# Patient Record
Sex: MALE | Race: WHITE | NOT HISPANIC OR LATINO | Employment: OTHER | URBAN - METROPOLITAN AREA
[De-identification: names, ages, dates, MRNs, and addresses within clinical notes are randomized per-mention and may not be internally consistent; named-entity substitution may affect disease eponyms.]

---

## 2018-07-02 NOTE — PROGRESS NOTES
Subjective   Patient ID: Ric Roy is a 63 y.o. male is being seen for consultation today at the request of Landon Walker MD for neck and left arm pain.  He has not had any recent treatments. He has had two pervious lumbar surgeries. Mr. Daley takes Hydrocodone 7.5/325 PRN for pain.     History of Present Illness    This patient has been having pain in his back and into his arm for about 6 months.  He says that the primary problem is when he turns his head to the left side he gets severe pain in the left side of his neck and up into the left side of his head.  It then begins to radiate down his left shoulder and his left arm with a combination of causalgia pain as well as dysesthesias.  The right arm is okay.  He has not had any specific treatment so far.  He has no difficulty with bowel or bladder control or other associated symptoms.  He says that the pain is severe when it flares up area     The following portions of the patient's history were reviewed and updated as appropriate: allergies, current medications, past family history, past medical history, past social history, past surgical history and problem list.    Review of Systems   Respiratory: Negative for chest tightness and shortness of breath.    Cardiovascular: Negative for chest pain.   Musculoskeletal: Positive for back pain and neck pain.   Neurological: Positive for dizziness, weakness, light-headedness and numbness.   Psychiatric/Behavioral: Positive for sleep disturbance.   All other systems reviewed and are negative.      Objective   Physical Exam   Constitutional: He is oriented to person, place, and time. He appears well-developed and well-nourished.   HENT:   Head: Normocephalic and atraumatic.   Eyes: Conjunctivae and EOM are normal. Pupils are equal, round, and reactive to light.   Fundoscopic exam:       The right eye shows no papilledema. The right eye shows venous pulsations.        The left eye shows no papilledema. The left  eye shows venous pulsations.   Neck: Carotid bruit is not present.   Neurological: He is oriented to person, place, and time. He has a normal Finger-Nose-Finger Test and a normal Heel to Shin Test. Gait normal.   Reflex Scores:       Tricep reflexes are 2+ on the right side and 2+ on the left side.       Bicep reflexes are 2+ on the right side and 2+ on the left side.       Brachioradialis reflexes are 2+ on the right side and 2+ on the left side.       Patellar reflexes are 2+ on the right side and 2+ on the left side.       Achilles reflexes are 2+ on the right side and 2+ on the left side.  Psychiatric: His speech is normal.     Neurologic Exam     Mental Status   Oriented to person, place, and time.   Registration of memory: Good recent and remote memory.   Attention: normal. Concentration: normal.   Speech: speech is normal   Level of consciousness: alert  Knowledge: consistent with education.     Cranial Nerves     CN II   Visual fields full to confrontation.   Visual acuity: normal    CN III, IV, VI   Pupils are equal, round, and reactive to light.  Extraocular motions are normal.     CN V   Facial sensation intact.   Right corneal reflex: normal  Left corneal reflex: normal    CN VII   Facial expression full, symmetric.   Right facial weakness: none  Left facial weakness: none    CN VIII   Hearing: intact    CN IX, X   Palate: symmetric    CN XI   Right sternocleidomastoid strength: normal  Left sternocleidomastoid strength: normal    CN XII   Tongue: not atrophic  Tongue deviation: none    Motor Exam   Muscle bulk: normal  Right arm tone: normal  Left arm tone: normal  Right leg tone: normal  Left leg tone: normal    Strength   Strength 5/5 except as noted.   Right anterior tibial: 0/5  Right posterior tibial: 0/5  Right peroneal: 0/5    Sensory Exam   Light touch normal.   Sensory deficit distribution on right: L5    Gait, Coordination, and Reflexes     Gait  Gait: normal    Coordination   Finger to nose  coordination: normal  Heel to shin coordination: normal    Reflexes   Right brachioradialis: 2+  Left brachioradialis: 2+  Right biceps: 2+  Left biceps: 2+  Right triceps: 2+  Left triceps: 2+  Right patellar: 2+  Left patellar: 2+  Right achilles: 2+  Left achilles: 2+  Right : 2+  Left : 2+      Assessment/Plan   Independent Review of Radiographic Studies:      I reviewed an MRI of his cervical spine done in May of this year.  This shows some foraminal narrowing on the left side at C2 3 but not severe.  There is central narrowing at C3 4 with some distortion of the cord and foraminal narrowing area there may be a bit of cord compression there.  This is also true at C4 5 and at C5 6.  C6 7 is a little narrow also but not as bad as the other levels.  C7-T1 looks okay.    Medical Decision Making:      I told the patient and his wife about the imaging.  I suggested to them that we should probably consider a cervical myelogram.  I told the patient what a myelogram involves.  I explained that there is a 50% chance of developing a bad headache and nausea as a result of the test.  I explained that there is also a very small chance of infection, seizures, and bleeding.  I explained how we would treat a post myelogram headache including bedrest, caffeinated fluids, steroids, and blood patch.  The patient does ask to proceed.    Ric was seen today for neck pain and arm pain.    Diagnoses and all orders for this visit:    Cervical spinal stenosis  -     Obtain Informed Consent; Standing  -     IR Myelogram Cervical Spine; Future  -     CT Cervical Spine Without Contrast; Future  -     XR Spine Cervical Complete With Flex Ext; Future  -     No Lab Testing Needed; Standing  -     dexamethasone (DECADRON) 4 MG tablet; Take 2 tablets by mouth Take As Directed. Take both tablets by mouth 2 hours before myelogram      Return for After radiology test.

## 2018-07-05 ENCOUNTER — OFFICE VISIT (OUTPATIENT)
Dept: NEUROSURGERY | Facility: CLINIC | Age: 63
End: 2018-07-05

## 2018-07-05 VITALS
BODY MASS INDEX: 33.78 KG/M2 | DIASTOLIC BLOOD PRESSURE: 90 MMHG | HEIGHT: 72 IN | WEIGHT: 249.4 LBS | HEART RATE: 80 BPM | SYSTOLIC BLOOD PRESSURE: 160 MMHG

## 2018-07-05 DIAGNOSIS — M48.02 CERVICAL SPINAL STENOSIS: Primary | ICD-10-CM

## 2018-07-05 PROCEDURE — 99244 OFF/OP CNSLTJ NEW/EST MOD 40: CPT | Performed by: NEUROLOGICAL SURGERY

## 2018-07-05 RX ORDER — DEXAMETHASONE 4 MG/1
8 TABLET ORAL TAKE AS DIRECTED
Qty: 2 TABLET | Refills: 0 | Status: SHIPPED | OUTPATIENT
Start: 2018-07-05 | End: 2018-07-10 | Stop reason: HOSPADM

## 2018-07-10 ENCOUNTER — HOSPITAL ENCOUNTER (OUTPATIENT)
Dept: GENERAL RADIOLOGY | Facility: HOSPITAL | Age: 63
Discharge: HOME | End: 2018-07-10
Attending: NEUROLOGICAL SURGERY

## 2018-07-10 ENCOUNTER — HOSPITAL ENCOUNTER (OUTPATIENT)
Dept: CT IMAGING | Facility: HOSPITAL | Age: 63
Discharge: HOME | End: 2018-07-10
Attending: NEUROLOGICAL SURGERY | Admitting: NEUROLOGICAL SURGERY

## 2018-07-10 VITALS
BODY MASS INDEX: 33.59 KG/M2 | WEIGHT: 248 LBS | HEIGHT: 72 IN | TEMPERATURE: 97.9 F | SYSTOLIC BLOOD PRESSURE: 156 MMHG | RESPIRATION RATE: 16 BRPM | HEART RATE: 77 BPM | OXYGEN SATURATION: 96 % | DIASTOLIC BLOOD PRESSURE: 84 MMHG

## 2018-07-10 DIAGNOSIS — M48.02 CERVICAL SPINAL STENOSIS: ICD-10-CM

## 2018-07-10 PROCEDURE — 62305 MYELOGRAPHY LUMBAR INJECTION: CPT

## 2018-07-10 PROCEDURE — 25010000002 IOPAMIDOL 61 % SOLUTION: Performed by: NEUROLOGICAL SURGERY

## 2018-07-10 PROCEDURE — 72052 X-RAY EXAM NECK SPINE 6/>VWS: CPT

## 2018-07-10 PROCEDURE — 72131 CT LUMBAR SPINE W/O DYE: CPT

## 2018-07-10 PROCEDURE — 72125 CT NECK SPINE W/O DYE: CPT

## 2018-07-10 PROCEDURE — 72240 MYELOGRAPHY NECK SPINE: CPT

## 2018-07-10 RX ORDER — HYDROCODONE BITARTRATE AND ACETAMINOPHEN 5; 325 MG/1; MG/1
1 TABLET ORAL EVERY 4 HOURS PRN
Status: DISCONTINUED | OUTPATIENT
Start: 2018-07-10 | End: 2018-07-11 | Stop reason: HOSPADM

## 2018-07-10 RX ORDER — LIDOCAINE HYDROCHLORIDE 10 MG/ML
10 INJECTION, SOLUTION INFILTRATION; PERINEURAL ONCE
Status: COMPLETED | OUTPATIENT
Start: 2018-07-10 | End: 2018-07-10

## 2018-07-10 RX ORDER — ACETAMINOPHEN 325 MG/1
650 TABLET ORAL EVERY 4 HOURS PRN
Status: DISCONTINUED | OUTPATIENT
Start: 2018-07-10 | End: 2018-07-11 | Stop reason: HOSPADM

## 2018-07-10 RX ADMIN — IOPAMIDOL 15 ML: 612 INJECTION, SOLUTION INTRATHECAL at 07:04

## 2018-07-10 RX ADMIN — LIDOCAINE HYDROCHLORIDE 7 ML: 10 INJECTION, SOLUTION INFILTRATION; PERINEURAL at 07:03

## 2018-07-10 NOTE — NURSING NOTE
Verbal and written D/C instructions given to patient and he voices understanding and is able to teach back D/C instructions.

## 2018-07-10 NOTE — NURSING NOTE
Returned to triage. Dressing to low back dry and intact. No complaint of headache. No distress. Fluids encouraged. Breakfast served. He stated his back is hurting but doesn't want any pain mds at this time.

## 2018-07-10 NOTE — DISCHARGE INSTRUCTIONS
EDUCATION /DISCHARGE INSTRUCTIONS:  A myelogram is a special radiology procedure of the spinal cord, spinal nerves and other related structures.  You will be awake during the examination.  An area of your lower back will be cleansed with an antiseptic solution.  The physician will inject a numbing medication in your lower back.  While your back is numb, a needle will be placed in the lower back area.  A small amount of spinal fluid may be withdrawn and sent to the lab if ordered by your physician. While the needle is in the back, an injection of a contrast material (xray dye) will be given through the needle.  The contrast material will allow the physician to see the spinal cord and spinal nerves.  Once injected, the needle will be removed and a band aid will be placed over the injection site.  The table will be tilted during the process to allow the contrast material to flow to particular areas in the spine.  Following the injection and xrays, you will be taken to the CT scan where more pictures will be taken. After the procedure is finished, the contrast material will be absorbed by your body and eliminated through your kidneys.  The radiologist will study and interpret your myelogram and send the results to your physician.    Procedure risks of a myelogram include, but are not limited to:  *  Bleeding   *  seizure  *  Infection   *  Headache, possibly severe requiring  *  Contrast reaction      a blood patch  *  Nerve or cord injury  *  Paralysis and death    Benefits of the procedure:  *  Best examination for delineating pathology related to spinal cord compression from a disc and/or nerve root compression    Alternatives to the procedure:  MRI - a non invasive procedure requiring intravenous contrast injection.  Cannot be done on patients with certain pacemakers or metal in the body.  MRI risks include possible reaction to the contrast material, movement of metal located in the body.  Benefit to MRI:   Non-invasive and usually painless procedure.  THIS EDUCATION INFORMATION WAS REVIEWED PRIOR TO PROCEDURE AND CONSENT. Patient initials________________Time____0645______________    Important information following your myelogram:  * ACTIVITY:   *  Lie down with your head elevated no more than 2 pillows high today & tonight  *  Sit up to eat your meals and use the restroom, otherwise, lie down.  *  Remain less active for two to three days.  *  Do not drive for 48 hours following a myelogram  *  You may remove the bandage and shower in the morning  *  Increase your fluids for the next 24 hours.  Caffeinated drinks are encouraged.    Resume taking diabetic medication  (Glucophage/Metformin) on 7/12/18    Resume taking blood thinner or aspirin on 7/11/18    CALL YOUR PHYSICIAN FOR THE FOLLOWING:  * Pain at the injections site  * Reddness, swelling, bruising or drainage at the injection site.  * A fever by mouth of 101.0  * Any new symptoms    Headaches are a common side effect after a myelogram.  If you get a headache, you should stay flat in bed and drink plenty of fluids. If the headache persist and does not go away with rest/medication, CALL Dr. Rincon at [phone]

## 2018-07-11 ENCOUNTER — TELEPHONE (OUTPATIENT)
Dept: INTERVENTIONAL RADIOLOGY/VASCULAR | Facility: HOSPITAL | Age: 63
End: 2018-07-11

## 2018-07-24 ENCOUNTER — OFFICE VISIT (OUTPATIENT)
Dept: NEUROSURGERY | Facility: CLINIC | Age: 63
End: 2018-07-24

## 2018-07-24 VITALS — HEART RATE: 74 BPM | SYSTOLIC BLOOD PRESSURE: 190 MMHG | DIASTOLIC BLOOD PRESSURE: 92 MMHG

## 2018-07-24 DIAGNOSIS — M48.02 CERVICAL SPINAL STENOSIS: Primary | ICD-10-CM

## 2018-07-24 PROCEDURE — 99213 OFFICE O/P EST LOW 20 MIN: CPT | Performed by: NEUROLOGICAL SURGERY

## 2018-07-24 RX ORDER — CEFAZOLIN SODIUM 2 G/100ML
2 INJECTION, SOLUTION INTRAVENOUS ONCE
Status: CANCELLED | OUTPATIENT
Start: 2018-07-27 | End: 2018-07-27

## 2018-07-24 NOTE — PROGRESS NOTES
Subjective   Patient ID: Ric Roy is a 63 y.o. male is here today for follow-up with a new Cervical Myelogram that was ordered for neck pain that radiates into his left shoulder and down his arm.    History of Present Illness     This patient returns today.  He continues with pain primarily in his neck with radiation into his left shoulder and the upper part of his left arm as well as in his left hand.  He has numbness and tingling in his left hand.  He has no difficulty with bowel or bladder control or other associated symptoms.    The following portions of the patient's history were reviewed and updated as appropriate: allergies, current medications, past family history, past medical history, past social history, past surgical history and problem list.    Review of Systems   Respiratory: Negative for chest tightness and shortness of breath.    Cardiovascular: Negative for chest pain.   Musculoskeletal: Positive for arthralgias ( Left shoulder pain) and neck pain.        Left arm pain   All other systems reviewed and are negative.      Objective   Physical Exam   Constitutional: He is oriented to person, place, and time. He appears well-developed and well-nourished.   Eyes: Pupils are equal, round, and reactive to light. EOM are normal.   Neurological: He is oriented to person, place, and time. He has a normal Finger-Nose-Finger Test and a normal Heel to Shin Test. Gait normal.   Reflex Scores:       Tricep reflexes are 2+ on the right side and 2+ on the left side.       Bicep reflexes are 2+ on the right side and 2+ on the left side.       Brachioradialis reflexes are 2+ on the right side and 2+ on the left side.       Patellar reflexes are 2+ on the right side and 2+ on the left side.       Achilles reflexes are 2+ on the right side and 2+ on the left side.  Psychiatric: His speech is normal.     Neurologic Exam     Mental Status   Oriented to person, place, and time.   Registration of memory: Good recent  and remote memory.   Attention: normal. Concentration: normal.   Speech: speech is normal   Level of consciousness: alert  Knowledge: consistent with education.     Cranial Nerves     CN II   Visual fields full to confrontation.   Visual acuity: normal    CN III, IV, VI   Pupils are equal, round, and reactive to light.  Extraocular motions are normal.     CN V   Facial sensation intact.   Right corneal reflex: normal  Left corneal reflex: normal    CN VII   Facial expression full, symmetric.   Right facial weakness: none  Left facial weakness: none    CN VIII   Hearing: intact    CN IX, X   Palate: symmetric    CN XI   Right sternocleidomastoid strength: normal  Left sternocleidomastoid strength: normal    CN XII   Tongue: not atrophic  Tongue deviation: none    Motor Exam   Muscle bulk: normal  Right arm tone: normal  Left arm tone: normal  Right leg tone: normal  Left leg tone: normal    Strength   Strength 5/5 except as noted.     Sensory Exam   Light touch normal.     Gait, Coordination, and Reflexes     Gait  Gait: normal    Coordination   Finger to nose coordination: normal  Heel to shin coordination: normal    Reflexes   Right brachioradialis: 2+  Left brachioradialis: 2+  Right biceps: 2+  Left biceps: 2+  Right triceps: 2+  Left triceps: 2+  Right patellar: 2+  Left patellar: 2+  Right achilles: 2+  Left achilles: 2+  Right : 2+  Left : 2+      Assessment/Plan   Independent Review of Radiographic Studies:      I reviewed his plain films, myelogram, and CT scan in both the cervical and the lumbar spines.  The plain films in the cervical spine show multilevel degenerative disease but no evidence of instability on flexion and extension.  On the myelogram itself with the initial injection of contrast there was fairly severe left lateral recess stenosis at L3 4 and fairly severe right lateral recess stenosis at L4 5.  At both of those levels especially on the standing films there is decreased nerve root  filling.  There is a grade 1 spondylolisthesis of L3 on L4 but no evidence of overt movement on flexion and extension.  There is also some narrowing at L2-3.  On the myelogram itself there are bilateral nerve root cut outs at C6 7.  There are also bilateral nerve root cut outs at C5 6.  On the post myelographic CT scan of the cervical spine the C2 3 level is widely open.  C3 4 shows some foraminal stenosis but not severe.  C4 5 shows a little flattening of the anterior cord and some foraminal stenosis.  C5 6 shows some ossification of the posterior longitudinal ligament but no severe compression of the cord.  There is however bilateral foraminal stenosis which is pretty severe on the left.  C6 7 also shows some central stenosis with some cord compression and fairly severe bilateral foraminal stenosis.  C7-T1 looks okay.  On the post myelographic CT scan of the lumbar spine the lower thoracic spine down to L1 2 looks okay.  At L2-3 there is some narrowing but I would call it more moderate.  L3 4 shows severe central narrowing.  L4 5 also shows fairly severe central narrowing with a previous laminectomy on the left.  L5-S1 shows some narrowing as well although overall the canal and the neural foramina are fairly open.  There are some nodules in the parotid gland.    Medical Decision Making:      I told the patient and his wife about the imaging.  I told him that we may need to do surgery on his lower back some day right now the more critical problem is the neck.  That is where he has some cord compression and may be developing an early myelopathy.  See little option but to do all 3 levels.  The narrowing at C3 4 is not all that bad and C7-T1 looks okay as well.  I told the patient about the surgery which is called an anterior cervical discectomy.  I explained that there is an 80% chance of getting rid of the arm pain and any other arm symptoms.  I also explained that the patient would still have neck pain.  Initially  this will be quite severe however it will improve with healing from the surgery.  There is also a risk of infection, bleeding, paralysis, and anesthetic risk.  There is a risk of damage to the soft tissues of the neck such as the esophagus, carotids, and trachea.  All of these risks are about 2 or 3%.  There is about a 5% chance of nonunion or failure of the instrumentation.  There is also a about a 5% chance of hoarseness and trouble swallowing do to damage to the recurrent laryngeal nerve.  We discussed the postoperative hospital and home course as well.  The patient does ask to proceed.  I also told him about the parotid nodules and told him we would give him a copy of the CT report to show to his PCP to see if further workup is necessary.    He will need to be scheduled for a: C4 to C7 anterior cervical discectomy, fusion and instrumentation    Ric was seen today for neck pain.    Diagnoses and all orders for this visit:    Cervical spinal stenosis  -     Case Request; Standing  -     ceFAZolin (ANCEF) 2 g in sodium chloride 0.9 % 100 mL IVPB; Infuse 2 g into a venous catheter 1 (One) Time.  -     Case Request    Other orders  -     Follow anesthesia standing orders.  -     Obtain informed consent  -     SCD (sequential compression device)- to be placed on patient in Pre-op; Standing      Return for 2-3 week post op.

## 2018-07-25 ENCOUNTER — APPOINTMENT (OUTPATIENT)
Dept: PREADMISSION TESTING | Facility: HOSPITAL | Age: 63
End: 2018-07-25

## 2018-07-25 VITALS
HEIGHT: 72 IN | DIASTOLIC BLOOD PRESSURE: 99 MMHG | SYSTOLIC BLOOD PRESSURE: 168 MMHG | BODY MASS INDEX: 33.59 KG/M2 | TEMPERATURE: 98.2 F | OXYGEN SATURATION: 97 % | RESPIRATION RATE: 20 BRPM | WEIGHT: 248 LBS | HEART RATE: 67 BPM

## 2018-07-25 LAB
ANION GAP SERPL CALCULATED.3IONS-SCNC: 13.7 MMOL/L
BUN BLD-MCNC: 12 MG/DL (ref 8–23)
BUN/CREAT SERPL: 8.1 (ref 7–25)
CALCIUM SPEC-SCNC: 7.5 MG/DL (ref 8.6–10.5)
CHLORIDE SERPL-SCNC: 101 MMOL/L (ref 98–107)
CO2 SERPL-SCNC: 24.3 MMOL/L (ref 22–29)
CREAT BLD-MCNC: 1.48 MG/DL (ref 0.76–1.27)
DEPRECATED RDW RBC AUTO: 45.6 FL (ref 37–54)
ERYTHROCYTE [DISTWIDTH] IN BLOOD BY AUTOMATED COUNT: 13.7 % (ref 11.5–14.5)
GFR SERPL CREATININE-BSD FRML MDRD: 48 ML/MIN/1.73
GLUCOSE BLD-MCNC: 132 MG/DL (ref 65–99)
HCT VFR BLD AUTO: 41.9 % (ref 40.4–52.2)
HGB BLD-MCNC: 13.3 G/DL (ref 13.7–17.6)
MCH RBC QN AUTO: 29.2 PG (ref 27–32.7)
MCHC RBC AUTO-ENTMCNC: 31.7 G/DL (ref 32.6–36.4)
MCV RBC AUTO: 91.9 FL (ref 79.8–96.2)
PLATELET # BLD AUTO: 162 10*3/MM3 (ref 140–500)
PMV BLD AUTO: 12.8 FL (ref 6–12)
POTASSIUM BLD-SCNC: 4.1 MMOL/L (ref 3.5–5.2)
RBC # BLD AUTO: 4.56 10*6/MM3 (ref 4.6–6)
SODIUM BLD-SCNC: 139 MMOL/L (ref 136–145)
WBC NRBC COR # BLD: 12 10*3/MM3 (ref 4.5–10.7)

## 2018-07-25 PROCEDURE — 93010 ELECTROCARDIOGRAM REPORT: CPT | Performed by: INTERNAL MEDICINE

## 2018-07-25 PROCEDURE — 85027 COMPLETE CBC AUTOMATED: CPT | Performed by: NEUROLOGICAL SURGERY

## 2018-07-25 PROCEDURE — 93005 ELECTROCARDIOGRAM TRACING: CPT

## 2018-07-25 PROCEDURE — 36415 COLL VENOUS BLD VENIPUNCTURE: CPT

## 2018-07-25 PROCEDURE — 80048 BASIC METABOLIC PNL TOTAL CA: CPT | Performed by: NEUROLOGICAL SURGERY

## 2018-07-25 NOTE — DISCHARGE INSTRUCTIONS
Take the following medications the morning of surgery with a small sip of water:    Omeprazole    General Instructions:  • Do not eat solid food after midnight the night before surgery.  • You may drink clear liquids day of surgery but must stop at least one hour before your hospital arrival time.  • It is beneficial for you to have a clear drink that contains carbohydrates the day of surgery.  We suggest a 12 to 20 ounce bottle of Gatorade or Powerade for non-diabetic patients or a 12 to 20 ounce bottle of G2 or Powerade Zero for diabetic patients. (Pediatric patients, are not advised to drink a 12 to 20 ounce carbohydrate drink)    Clear liquids are liquids you can see through.  Nothing red in color.     Plain water                               Sports drinks  Sodas                                   Gelatin (Jell-O)  Fruit juices without pulp such as white grape juice and apple juice  Popsicles that contain no fruit or yogurt  Tea or coffee (no cream or milk added)  Gatorade / Powerade  G2 / Powerade Zero    • Infants may have breast milk up to four hours before surgery.  • Infants drinking formula may drink formula up to six hours before surgery.   • Patients who avoid smoking, chewing tobacco and alcohol for 4 weeks prior to surgery have a reduced risk of post-operative complications.  Quit smoking as many days before surgery as you can.  • Do not smoke, use chewing tobacco or drink alcohol the day of surgery.   • If applicable bring your C-PAP/ BI-PAP machine.  • Bring any papers given to you in the doctor’s office.  • Wear clean comfortable clothes and socks.  • Do not wear contact lenses or make-up.  Bring a case for your glasses.   • Bring crutches or walker if applicable.  • Remove all piercings.  Leave jewelry and any other valuables at home.  • Hair extensions with metal clips must be removed prior to surgery.  • The Pre-Admission Testing nurse will instruct you to bring medications if unable to obtain an  accurate list in Pre-Admission Testing.        If you were given a blood bank ID arm band remember to bring it with you the day of surgery.    Preventing a Surgical Site Infection:  • For 2 to 3 days before surgery, avoid shaving with a razor because the razor can irritate skin and make it easier to develop an infection.    • Any areas of open skin can increase the risk of a post-operative wound infection by allowing bacteria to enter and travel throughout the body.  Notify your surgeon if you have any skin wounds / rashes even if it is not near the expected surgical site.  The area will need assessed to determine if surgery should be delayed until it is healed.  • The night prior to surgery sleep in a clean bed with clean clothing.  Do not allow pets to sleep with you.  • Shower on the morning of surgery using a fresh bar of anti-bacterial soap (such as Dial) and clean washcloth.  Dry with a clean towel and dress in clean clothing.  • Ask your surgeon if you will be receiving antibiotics prior to surgery.  • Make sure you, your family, and all healthcare providers clean their hands with soap and water or an alcohol based hand  before caring for you or your wound.    Day of surgery:7/27/18   0530  Upon arrival, a Pre-op nurse and Anesthesiologist will review your health history, obtain vital signs, and answer questions you may have.  The only belongings needed at this time will be your home medications and if applicable your C-PAP/BI-PAP machine.  If you are staying overnight your family can leave the rest of your belongings in the car and bring them to your room later.  A Pre-op nurse will start an IV and you may receive medication in preparation for surgery, including something to help you relax.  Your family will be able to see you in the Pre-op area.  While you are in surgery your family should notify the waiting room  if they leave the waiting room area and provide a contact phone  number.    Please be aware that surgery does come with discomfort.  We want to make every effort to control your discomfort so please discuss any uncontrolled symptoms with your nurse.   Your doctor will most likely have prescribed pain medications.      If you are going home after surgery you will receive individualized written care instructions before being discharged.  A responsible adult must drive you to and from the hospital on the day of your surgery and stay with you for 24 hours.    If you are staying overnight following surgery, you will be transported to your hospital room following the recovery period.  [facility] has all private rooms.    You have received a list of surgical assistants for your reference.  If you have any questions please call Pre-Admission Testing at [phone].  Deductibles and co-payments are collected on the day of service. Please be prepared to pay the required co-pay, deductible or deposit on the day of service as defined by your plan.

## 2018-07-27 ENCOUNTER — APPOINTMENT (OUTPATIENT)
Dept: GENERAL RADIOLOGY | Facility: HOSPITAL | Age: 63
End: 2018-07-27

## 2018-07-27 ENCOUNTER — ANESTHESIA (OUTPATIENT)
Dept: PERIOP | Facility: HOSPITAL | Age: 63
End: 2018-07-27

## 2018-07-27 ENCOUNTER — ANESTHESIA EVENT (OUTPATIENT)
Dept: PERIOP | Facility: HOSPITAL | Age: 63
End: 2018-07-27

## 2018-07-27 ENCOUNTER — HOSPITAL ENCOUNTER (INPATIENT)
Facility: HOSPITAL | Age: 63
LOS: 3 days | Discharge: HOME | End: 2018-07-30
Attending: NEUROLOGICAL SURGERY | Admitting: NEUROLOGICAL SURGERY

## 2018-07-27 DIAGNOSIS — M48.02 CERVICAL SPINAL STENOSIS: ICD-10-CM

## 2018-07-27 PROBLEM — R11.2 NAUSEA & VOMITING: Status: ACTIVE | Noted: 2018-07-27

## 2018-07-27 PROBLEM — E11.9 DIABETES MELLITUS (HCC): Status: ACTIVE | Noted: 2018-07-27

## 2018-07-27 PROBLEM — N18.30 STAGE 3 CHRONIC KIDNEY DISEASE (HCC): Status: ACTIVE | Noted: 2018-07-27

## 2018-07-27 PROBLEM — I10 HYPERTENSION: Status: ACTIVE | Noted: 2018-07-27

## 2018-07-27 LAB
GLUCOSE BLDC GLUCOMTR-MCNC: 131 MG/DL (ref 70–130)
GLUCOSE BLDC GLUCOMTR-MCNC: 175 MG/DL (ref 70–130)
GLUCOSE BLDC GLUCOMTR-MCNC: 178 MG/DL (ref 70–130)

## 2018-07-27 PROCEDURE — 25010000002 ONDANSETRON PER 1 MG: Performed by: NEUROLOGICAL SURGERY

## 2018-07-27 PROCEDURE — 22551 ARTHRD ANT NTRBDY CERVICAL: CPT | Performed by: NEUROLOGICAL SURGERY

## 2018-07-27 PROCEDURE — 25010000002 PHENYLEPHRINE PER 1 ML: Performed by: NURSE ANESTHETIST, CERTIFIED REGISTERED

## 2018-07-27 PROCEDURE — C1713 ANCHOR/SCREW BN/BN,TIS/BN: HCPCS | Performed by: NEUROLOGICAL SURGERY

## 2018-07-27 PROCEDURE — 25010000002 FENTANYL CITRATE (PF) 100 MCG/2ML SOLUTION: Performed by: NURSE ANESTHETIST, CERTIFIED REGISTERED

## 2018-07-27 PROCEDURE — 25010000003 CEFAZOLIN IN DEXTROSE 2-4 GM/100ML-% SOLUTION: Performed by: NEUROLOGICAL SURGERY

## 2018-07-27 PROCEDURE — 25010000002 DEXAMETHASONE PER 1 MG: Performed by: NURSE ANESTHETIST, CERTIFIED REGISTERED

## 2018-07-27 PROCEDURE — 0RT30ZZ RESECTION OF CERVICAL VERTEBRAL DISC, OPEN APPROACH: ICD-10-PCS | Performed by: NEUROLOGICAL SURGERY

## 2018-07-27 PROCEDURE — 25010000002 MIDAZOLAM PER 1 MG: Performed by: ANESTHESIOLOGY

## 2018-07-27 PROCEDURE — 25010000002 DIPHENHYDRAMINE PER 50 MG: Performed by: INTERNAL MEDICINE

## 2018-07-27 PROCEDURE — 25010000002 FENTANYL CITRATE (PF) 100 MCG/2ML SOLUTION: Performed by: ANESTHESIOLOGY

## 2018-07-27 PROCEDURE — 22853 INSJ BIOMECHANICAL DEVICE: CPT | Performed by: NEUROLOGICAL SURGERY

## 2018-07-27 PROCEDURE — 25010000003 CEFAZOLIN PER 500 MG: Performed by: NEUROLOGICAL SURGERY

## 2018-07-27 PROCEDURE — 76000 FLUOROSCOPY <1 HR PHYS/QHP: CPT

## 2018-07-27 PROCEDURE — 22846 INSERT SPINE FIXATION DEVICE: CPT | Performed by: NEUROLOGICAL SURGERY

## 2018-07-27 PROCEDURE — 22552 ARTHRD ANT NTRBD CERVICAL EA: CPT | Performed by: NEUROLOGICAL SURGERY

## 2018-07-27 PROCEDURE — G0378 HOSPITAL OBSERVATION PER HR: HCPCS

## 2018-07-27 PROCEDURE — L0172 CERV COL SR FOAM 2PC PRE OTS: HCPCS | Performed by: NEUROLOGICAL SURGERY

## 2018-07-27 PROCEDURE — 25010000002 ONDANSETRON PER 1 MG: Performed by: NURSE ANESTHETIST, CERTIFIED REGISTERED

## 2018-07-27 PROCEDURE — 63710000001 PROMETHAZINE PER 12.5 MG: Performed by: NEUROLOGICAL SURGERY

## 2018-07-27 PROCEDURE — 25010000002 PROPOFOL 10 MG/ML EMULSION: Performed by: NURSE ANESTHETIST, CERTIFIED REGISTERED

## 2018-07-27 PROCEDURE — 25010000002 PROCHLORPERAZINE EDISYLATE PER 10 MG: Performed by: INTERNAL MEDICINE

## 2018-07-27 PROCEDURE — 25010000002 MORPHINE PER 10 MG: Performed by: NEUROLOGICAL SURGERY

## 2018-07-27 PROCEDURE — 25010000002 HYDROMORPHONE PER 4 MG: Performed by: NURSE ANESTHETIST, CERTIFIED REGISTERED

## 2018-07-27 PROCEDURE — 0RG20A0 FUSION OF 2 OR MORE CERVICAL VERTEBRAL JOINTS WITH INTERBODY FUSION DEVICE, ANTERIOR APPROACH, ANTERIOR COLUMN, OPEN APPROACH: ICD-10-PCS | Performed by: NEUROLOGICAL SURGERY

## 2018-07-27 PROCEDURE — 72040 X-RAY EXAM NECK SPINE 2-3 VW: CPT

## 2018-07-27 PROCEDURE — 82962 GLUCOSE BLOOD TEST: CPT

## 2018-07-27 DEVICE — IMPLANT 6240664 ANATOMIC 16X14X6MM
Type: IMPLANTABLE DEVICE | Site: SPINE CERVICAL | Status: FUNCTIONAL
Brand: VERTE-STACK® SPINAL SYSTEM

## 2018-07-27 DEVICE — PLATE 7200065 ATL VISION ELITE 65MM
Type: IMPLANTABLE DEVICE | Site: SPINE CERVICAL | Status: FUNCTIONAL
Brand: ATLANTIS® ANTERIOR CERVICAL PLATE SYSTEM

## 2018-07-27 DEVICE — PUTTY DBF GRAFTON 3CC: Type: IMPLANTABLE DEVICE | Site: SPINE CERVICAL | Status: FUNCTIONAL

## 2018-07-27 RX ORDER — ROCURONIUM BROMIDE 10 MG/ML
INJECTION, SOLUTION INTRAVENOUS AS NEEDED
Status: DISCONTINUED | OUTPATIENT
Start: 2018-07-27 | End: 2018-07-27 | Stop reason: SURG

## 2018-07-27 RX ORDER — GABAPENTIN 300 MG/1
300 CAPSULE ORAL 3 TIMES DAILY
Status: DISCONTINUED | OUTPATIENT
Start: 2018-07-27 | End: 2018-07-30 | Stop reason: HOSPADM

## 2018-07-27 RX ORDER — CEFAZOLIN SODIUM 2 G/100ML
2 INJECTION, SOLUTION INTRAVENOUS ONCE
Status: COMPLETED | OUTPATIENT
Start: 2018-07-27 | End: 2018-07-27

## 2018-07-27 RX ORDER — ONDANSETRON 2 MG/ML
4 INJECTION INTRAMUSCULAR; INTRAVENOUS ONCE AS NEEDED
Status: DISCONTINUED | OUTPATIENT
Start: 2018-07-27 | End: 2018-07-27 | Stop reason: HOSPADM

## 2018-07-27 RX ORDER — DIPHENHYDRAMINE HYDROCHLORIDE 50 MG/ML
25 INJECTION INTRAMUSCULAR; INTRAVENOUS ONCE
Status: COMPLETED | OUTPATIENT
Start: 2018-07-27 | End: 2018-07-27

## 2018-07-27 RX ORDER — ATENOLOL 25 MG/1
25 TABLET ORAL NIGHTLY
Status: DISCONTINUED | OUTPATIENT
Start: 2018-07-27 | End: 2018-07-30 | Stop reason: HOSPADM

## 2018-07-27 RX ORDER — FENTANYL CITRATE 50 UG/ML
INJECTION, SOLUTION INTRAMUSCULAR; INTRAVENOUS AS NEEDED
Status: DISCONTINUED | OUTPATIENT
Start: 2018-07-27 | End: 2018-07-27 | Stop reason: SURG

## 2018-07-27 RX ORDER — LIDOCAINE HYDROCHLORIDE 10 MG/ML
0.5 INJECTION, SOLUTION EPIDURAL; INFILTRATION; INTRACAUDAL; PERINEURAL ONCE AS NEEDED
Status: DISCONTINUED | OUTPATIENT
Start: 2018-07-27 | End: 2018-07-27 | Stop reason: HOSPADM

## 2018-07-27 RX ORDER — ONDANSETRON 2 MG/ML
INJECTION INTRAMUSCULAR; INTRAVENOUS AS NEEDED
Status: DISCONTINUED | OUTPATIENT
Start: 2018-07-27 | End: 2018-07-27 | Stop reason: SURG

## 2018-07-27 RX ORDER — EPHEDRINE SULFATE 50 MG/ML
5 INJECTION, SOLUTION INTRAVENOUS ONCE AS NEEDED
Status: DISCONTINUED | OUTPATIENT
Start: 2018-07-27 | End: 2018-07-27 | Stop reason: HOSPADM

## 2018-07-27 RX ORDER — PROPOFOL 10 MG/ML
VIAL (ML) INTRAVENOUS AS NEEDED
Status: DISCONTINUED | OUTPATIENT
Start: 2018-07-27 | End: 2018-07-27 | Stop reason: SURG

## 2018-07-27 RX ORDER — SODIUM CHLORIDE AND POTASSIUM CHLORIDE 150; 900 MG/100ML; MG/100ML
100 INJECTION, SOLUTION INTRAVENOUS CONTINUOUS
Status: DISCONTINUED | OUTPATIENT
Start: 2018-07-27 | End: 2018-07-27

## 2018-07-27 RX ORDER — FENTANYL CITRATE 50 UG/ML
50 INJECTION, SOLUTION INTRAMUSCULAR; INTRAVENOUS
Status: DISCONTINUED | OUTPATIENT
Start: 2018-07-27 | End: 2018-07-27 | Stop reason: HOSPADM

## 2018-07-27 RX ORDER — DIPHENHYDRAMINE HYDROCHLORIDE 50 MG/ML
12.5 INJECTION INTRAMUSCULAR; INTRAVENOUS
Status: DISCONTINUED | OUTPATIENT
Start: 2018-07-27 | End: 2018-07-27 | Stop reason: HOSPADM

## 2018-07-27 RX ORDER — NALOXONE HCL 0.4 MG/ML
0.4 VIAL (ML) INJECTION
Status: DISCONTINUED | OUTPATIENT
Start: 2018-07-27 | End: 2018-07-30 | Stop reason: HOSPADM

## 2018-07-27 RX ORDER — ONDANSETRON 2 MG/ML
4 INJECTION INTRAMUSCULAR; INTRAVENOUS EVERY 6 HOURS PRN
Status: DISCONTINUED | OUTPATIENT
Start: 2018-07-27 | End: 2018-07-30 | Stop reason: HOSPADM

## 2018-07-27 RX ORDER — LABETALOL HYDROCHLORIDE 5 MG/ML
5 INJECTION, SOLUTION INTRAVENOUS
Status: DISCONTINUED | OUTPATIENT
Start: 2018-07-27 | End: 2018-07-27 | Stop reason: HOSPADM

## 2018-07-27 RX ORDER — NICOTINE POLACRILEX 4 MG
15 LOZENGE BUCCAL
Status: DISCONTINUED | OUTPATIENT
Start: 2018-07-27 | End: 2018-07-30 | Stop reason: HOSPADM

## 2018-07-27 RX ORDER — DEXTROSE MONOHYDRATE 25 G/50ML
25 INJECTION, SOLUTION INTRAVENOUS
Status: DISCONTINUED | OUTPATIENT
Start: 2018-07-27 | End: 2018-07-30 | Stop reason: HOSPADM

## 2018-07-27 RX ORDER — FLUMAZENIL 0.1 MG/ML
0.2 INJECTION INTRAVENOUS AS NEEDED
Status: DISCONTINUED | OUTPATIENT
Start: 2018-07-27 | End: 2018-07-27 | Stop reason: HOSPADM

## 2018-07-27 RX ORDER — PROMETHAZINE HYDROCHLORIDE 12.5 MG/1
12.5 TABLET ORAL EVERY 6 HOURS PRN
Status: DISCONTINUED | OUTPATIENT
Start: 2018-07-27 | End: 2018-07-30 | Stop reason: HOSPADM

## 2018-07-27 RX ORDER — MIDAZOLAM HYDROCHLORIDE 1 MG/ML
1 INJECTION INTRAMUSCULAR; INTRAVENOUS
Status: DISCONTINUED | OUTPATIENT
Start: 2018-07-27 | End: 2018-07-27 | Stop reason: HOSPADM

## 2018-07-27 RX ORDER — CEFAZOLIN SODIUM 2 G/100ML
2 INJECTION, SOLUTION INTRAVENOUS EVERY 8 HOURS
Status: COMPLETED | OUTPATIENT
Start: 2018-07-27 | End: 2018-07-29

## 2018-07-27 RX ORDER — HYDRALAZINE HYDROCHLORIDE 25 MG/1
25 TABLET, FILM COATED ORAL EVERY 6 HOURS PRN
Status: DISCONTINUED | OUTPATIENT
Start: 2018-07-27 | End: 2018-07-30 | Stop reason: HOSPADM

## 2018-07-27 RX ORDER — DEXAMETHASONE SODIUM PHOSPHATE 10 MG/ML
INJECTION INTRAMUSCULAR; INTRAVENOUS AS NEEDED
Status: DISCONTINUED | OUTPATIENT
Start: 2018-07-27 | End: 2018-07-27 | Stop reason: SURG

## 2018-07-27 RX ORDER — MIDAZOLAM HYDROCHLORIDE 1 MG/ML
2 INJECTION INTRAMUSCULAR; INTRAVENOUS
Status: DISCONTINUED | OUTPATIENT
Start: 2018-07-27 | End: 2018-07-27 | Stop reason: HOSPADM

## 2018-07-27 RX ORDER — HEPARIN SODIUM 10000 [USP'U]/ML
INJECTION, SOLUTION INTRAVENOUS; SUBCUTANEOUS AS NEEDED
Status: DISCONTINUED | OUTPATIENT
Start: 2018-07-27 | End: 2018-07-27 | Stop reason: HOSPADM

## 2018-07-27 RX ORDER — SODIUM CHLORIDE 0.9 % (FLUSH) 0.9 %
1-10 SYRINGE (ML) INJECTION AS NEEDED
Status: DISCONTINUED | OUTPATIENT
Start: 2018-07-27 | End: 2018-07-30 | Stop reason: HOSPADM

## 2018-07-27 RX ORDER — NALOXONE HCL 0.4 MG/ML
0.2 VIAL (ML) INJECTION AS NEEDED
Status: DISCONTINUED | OUTPATIENT
Start: 2018-07-27 | End: 2018-07-27 | Stop reason: HOSPADM

## 2018-07-27 RX ORDER — LISINOPRIL 10 MG/1
10 TABLET ORAL EVERY MORNING
Status: DISCONTINUED | OUTPATIENT
Start: 2018-07-27 | End: 2018-07-28

## 2018-07-27 RX ORDER — SODIUM CHLORIDE, SODIUM LACTATE, POTASSIUM CHLORIDE, CALCIUM CHLORIDE 600; 310; 30; 20 MG/100ML; MG/100ML; MG/100ML; MG/100ML
9 INJECTION, SOLUTION INTRAVENOUS CONTINUOUS
Status: DISCONTINUED | OUTPATIENT
Start: 2018-07-27 | End: 2018-07-27

## 2018-07-27 RX ORDER — SODIUM CHLORIDE 9 MG/ML
INJECTION, SOLUTION INTRAVENOUS AS NEEDED
Status: DISCONTINUED | OUTPATIENT
Start: 2018-07-27 | End: 2018-07-27 | Stop reason: HOSPADM

## 2018-07-27 RX ORDER — SODIUM CHLORIDE 0.9 % (FLUSH) 0.9 %
1-10 SYRINGE (ML) INJECTION AS NEEDED
Status: DISCONTINUED | OUTPATIENT
Start: 2018-07-27 | End: 2018-07-27 | Stop reason: HOSPADM

## 2018-07-27 RX ORDER — HYDROCODONE BITARTRATE AND ACETAMINOPHEN 5; 325 MG/1; MG/1
1 TABLET ORAL EVERY 4 HOURS PRN
Status: DISCONTINUED | OUTPATIENT
Start: 2018-07-27 | End: 2018-07-30 | Stop reason: HOSPADM

## 2018-07-27 RX ORDER — ONDANSETRON 4 MG/1
4 TABLET, ORALLY DISINTEGRATING ORAL EVERY 6 HOURS PRN
Status: DISCONTINUED | OUTPATIENT
Start: 2018-07-27 | End: 2018-07-30 | Stop reason: HOSPADM

## 2018-07-27 RX ORDER — PIOGLITAZONEHYDROCHLORIDE 30 MG/1
30 TABLET ORAL EVERY MORNING
Status: DISCONTINUED | OUTPATIENT
Start: 2018-07-28 | End: 2018-07-30 | Stop reason: HOSPADM

## 2018-07-27 RX ORDER — PROMETHAZINE HYDROCHLORIDE 25 MG/ML
12.5 INJECTION, SOLUTION INTRAMUSCULAR; INTRAVENOUS ONCE AS NEEDED
Status: DISCONTINUED | OUTPATIENT
Start: 2018-07-27 | End: 2018-07-27 | Stop reason: HOSPADM

## 2018-07-27 RX ORDER — PROMETHAZINE HYDROCHLORIDE 25 MG/1
25 TABLET ORAL ONCE AS NEEDED
Status: DISCONTINUED | OUTPATIENT
Start: 2018-07-27 | End: 2018-07-27 | Stop reason: HOSPADM

## 2018-07-27 RX ORDER — HYDROMORPHONE HCL 110MG/55ML
PATIENT CONTROLLED ANALGESIA SYRINGE INTRAVENOUS AS NEEDED
Status: DISCONTINUED | OUTPATIENT
Start: 2018-07-27 | End: 2018-07-27 | Stop reason: SURG

## 2018-07-27 RX ORDER — LIDOCAINE HYDROCHLORIDE 20 MG/ML
INJECTION, SOLUTION INFILTRATION; PERINEURAL AS NEEDED
Status: DISCONTINUED | OUTPATIENT
Start: 2018-07-27 | End: 2018-07-27 | Stop reason: SURG

## 2018-07-27 RX ORDER — HYDROMORPHONE HYDROCHLORIDE 1 MG/ML
0.5 INJECTION, SOLUTION INTRAMUSCULAR; INTRAVENOUS; SUBCUTANEOUS
Status: DISCONTINUED | OUTPATIENT
Start: 2018-07-27 | End: 2018-07-27 | Stop reason: HOSPADM

## 2018-07-27 RX ORDER — IPRATROPIUM BROMIDE AND ALBUTEROL SULFATE 2.5; .5 MG/3ML; MG/3ML
3 SOLUTION RESPIRATORY (INHALATION)
Status: DISCONTINUED | OUTPATIENT
Start: 2018-07-27 | End: 2018-07-27

## 2018-07-27 RX ORDER — SODIUM CHLORIDE 9 MG/ML
100 INJECTION, SOLUTION INTRAVENOUS CONTINUOUS
Status: DISCONTINUED | OUTPATIENT
Start: 2018-07-27 | End: 2018-07-29

## 2018-07-27 RX ORDER — PROMETHAZINE HYDROCHLORIDE 25 MG/1
25 SUPPOSITORY RECTAL ONCE AS NEEDED
Status: DISCONTINUED | OUTPATIENT
Start: 2018-07-27 | End: 2018-07-27 | Stop reason: HOSPADM

## 2018-07-27 RX ORDER — MORPHINE SULFATE 2 MG/ML
2 INJECTION, SOLUTION INTRAMUSCULAR; INTRAVENOUS EVERY 4 HOURS PRN
Status: DISCONTINUED | OUTPATIENT
Start: 2018-07-27 | End: 2018-07-30 | Stop reason: HOSPADM

## 2018-07-27 RX ORDER — ONDANSETRON 4 MG/1
4 TABLET, FILM COATED ORAL EVERY 6 HOURS PRN
Status: DISCONTINUED | OUTPATIENT
Start: 2018-07-27 | End: 2018-07-30 | Stop reason: HOSPADM

## 2018-07-27 RX ORDER — FAMOTIDINE 10 MG/ML
20 INJECTION, SOLUTION INTRAVENOUS ONCE
Status: COMPLETED | OUTPATIENT
Start: 2018-07-27 | End: 2018-07-27

## 2018-07-27 RX ORDER — SCOLOPAMINE TRANSDERMAL SYSTEM 1 MG/1
1 PATCH, EXTENDED RELEASE TRANSDERMAL
Status: DISCONTINUED | OUTPATIENT
Start: 2018-07-27 | End: 2018-07-30 | Stop reason: HOSPADM

## 2018-07-27 RX ADMIN — LIDOCAINE HYDROCHLORIDE 100 MG: 20 INJECTION, SOLUTION INFILTRATION; PERINEURAL at 08:05

## 2018-07-27 RX ADMIN — ROCURONIUM BROMIDE 50 MG: 10 INJECTION INTRAVENOUS at 08:05

## 2018-07-27 RX ADMIN — SODIUM CHLORIDE, POTASSIUM CHLORIDE, SODIUM LACTATE AND CALCIUM CHLORIDE 9 ML/HR: 600; 310; 30; 20 INJECTION, SOLUTION INTRAVENOUS at 07:00

## 2018-07-27 RX ADMIN — HYDROCODONE BITARTRATE AND ACETAMINOPHEN 1 TABLET: 5; 325 TABLET ORAL at 21:09

## 2018-07-27 RX ADMIN — SODIUM CHLORIDE, POTASSIUM CHLORIDE, SODIUM LACTATE AND CALCIUM CHLORIDE: 600; 310; 30; 20 INJECTION, SOLUTION INTRAVENOUS at 10:37

## 2018-07-27 RX ADMIN — FAMOTIDINE 20 MG: 10 INJECTION INTRAVENOUS at 06:49

## 2018-07-27 RX ADMIN — LABETALOL HYDROCHLORIDE 5 MG: 5 INJECTION INTRAVENOUS at 12:07

## 2018-07-27 RX ADMIN — HYDROMORPHONE HYDROCHLORIDE 0.5 MG: 2 INJECTION INTRAMUSCULAR; INTRAVENOUS; SUBCUTANEOUS at 10:39

## 2018-07-27 RX ADMIN — LABETALOL HYDROCHLORIDE 5 MG: 5 INJECTION INTRAVENOUS at 11:20

## 2018-07-27 RX ADMIN — PROMETHAZINE HYDROCHLORIDE 12.5 MG: 12.5 TABLET ORAL at 16:54

## 2018-07-27 RX ADMIN — PROCHLORPERAZINE EDISYLATE 10 MG: 5 INJECTION INTRAMUSCULAR; INTRAVENOUS at 20:14

## 2018-07-27 RX ADMIN — FENTANYL CITRATE 100 MCG: 50 INJECTION, SOLUTION INTRAMUSCULAR; INTRAVENOUS at 08:36

## 2018-07-27 RX ADMIN — MIDAZOLAM 1 MG: 1 INJECTION INTRAMUSCULAR; INTRAVENOUS at 06:49

## 2018-07-27 RX ADMIN — HYDROMORPHONE HYDROCHLORIDE 0.5 MG: 2 INJECTION INTRAMUSCULAR; INTRAVENOUS; SUBCUTANEOUS at 10:53

## 2018-07-27 RX ADMIN — FENTANYL CITRATE 50 MCG: 50 INJECTION, SOLUTION INTRAMUSCULAR; INTRAVENOUS at 06:49

## 2018-07-27 RX ADMIN — CEFAZOLIN SODIUM 2 G: 2 INJECTION, SOLUTION INTRAVENOUS at 08:15

## 2018-07-27 RX ADMIN — GABAPENTIN 300 MG: 300 CAPSULE ORAL at 20:09

## 2018-07-27 RX ADMIN — FENTANYL CITRATE 150 MCG: 50 INJECTION, SOLUTION INTRAMUSCULAR; INTRAVENOUS at 08:02

## 2018-07-27 RX ADMIN — DIPHENHYDRAMINE HYDROCHLORIDE 25 MG: 50 INJECTION, SOLUTION INTRAMUSCULAR; INTRAVENOUS at 20:14

## 2018-07-27 RX ADMIN — SUGAMMADEX 200 MG: 100 INJECTION, SOLUTION INTRAVENOUS at 10:43

## 2018-07-27 RX ADMIN — FENTANYL CITRATE 50 MCG: 50 INJECTION, SOLUTION INTRAMUSCULAR; INTRAVENOUS at 11:58

## 2018-07-27 RX ADMIN — PROPOFOL 300 MG: 10 INJECTION, EMULSION INTRAVENOUS at 08:05

## 2018-07-27 RX ADMIN — DEXAMETHASONE SODIUM PHOSPHATE 4 MG: 10 INJECTION INTRAMUSCULAR; INTRAVENOUS at 10:32

## 2018-07-27 RX ADMIN — LABETALOL HYDROCHLORIDE 5 MG: 5 INJECTION INTRAVENOUS at 11:31

## 2018-07-27 RX ADMIN — MORPHINE SULFATE 2 MG: 2 INJECTION, SOLUTION INTRAMUSCULAR; INTRAVENOUS at 21:09

## 2018-07-27 RX ADMIN — SCOPOLAMINE 1 PATCH: 1 PATCH, EXTENDED RELEASE TRANSDERMAL at 20:08

## 2018-07-27 RX ADMIN — CEFAZOLIN SODIUM 2 G: 2 INJECTION, SOLUTION INTRAVENOUS at 20:08

## 2018-07-27 RX ADMIN — SODIUM CHLORIDE 100 ML/HR: 9 INJECTION, SOLUTION INTRAVENOUS at 20:08

## 2018-07-27 RX ADMIN — PHENYLEPHRINE HYDROCHLORIDE 100 MCG: 10 INJECTION INTRAVENOUS at 10:25

## 2018-07-27 RX ADMIN — FENTANYL CITRATE 50 MCG: 50 INJECTION, SOLUTION INTRAMUSCULAR; INTRAVENOUS at 11:05

## 2018-07-27 RX ADMIN — CEFAZOLIN SODIUM 1 G: 2 INJECTION, SOLUTION INTRAVENOUS at 10:29

## 2018-07-27 RX ADMIN — MORPHINE SULFATE 2 MG: 2 INJECTION, SOLUTION INTRAMUSCULAR; INTRAVENOUS at 17:18

## 2018-07-27 RX ADMIN — ATENOLOL 25 MG: 25 TABLET ORAL at 20:09

## 2018-07-27 RX ADMIN — LISINOPRIL 10 MG: 10 TABLET ORAL at 12:20

## 2018-07-27 RX ADMIN — ONDANSETRON 4 MG: 2 INJECTION INTRAMUSCULAR; INTRAVENOUS at 10:32

## 2018-07-27 RX ADMIN — ONDANSETRON 4 MG: 2 INJECTION, SOLUTION INTRAMUSCULAR; INTRAVENOUS at 14:58

## 2018-07-27 RX ADMIN — LABETALOL HYDROCHLORIDE 5 MG: 5 INJECTION INTRAVENOUS at 12:19

## 2018-07-27 NOTE — ANESTHESIA PREPROCEDURE EVALUATION
Anesthesia Evaluation     Patient summary reviewed   NPO Solid Status: > 8 hours  NPO Liquid Status: > 2 hours           Airway   Mallampati: II  TM distance: >3 FB  Dental      Pulmonary    (+) a smoker Current Abstained day of surgery,   Cardiovascular     ECG reviewed  Rhythm: regular  Rate: normal    (+) hypertension, hyperlipidemia,       Neuro/Psych  GI/Hepatic/Renal/Endo    (+)   diabetes mellitus,     Musculoskeletal     Abdominal    Substance History      OB/GYN          Other                        Anesthesia Plan    ASA 3     general     intravenous induction   Anesthetic plan and risks discussed with patient.    Plan discussed with CRNA.

## 2018-07-27 NOTE — ANESTHESIA PROCEDURE NOTES
Airway  Urgency: elective    Date/Time: 7/27/2018 8:07 AM  Airway not difficult    General Information and Staff    Patient location during procedure: OR  Anesthesiologist: BURT ALBARRAN  CRNA: OLGA MATA    Indications and Patient Condition  Indications for airway management: airway protection    Preoxygenated: yes  Mask difficulty assessment: 3 - difficult mask (inadequate, unstable or two providers) +/- NMBA    Final Airway Details  Final airway type: endotracheal airway      Successful airway: ETT and reinforced tube  Cuffed: yes   Successful intubation technique: direct laryngoscopy  Facilitating devices/methods: intubating stylet and anterior pressure/BURP  Endotracheal tube insertion site: oral  Blade: Shafer  Blade size: #2  ETT size: 8.0 mm  Cormack-Lehane Classification: grade IIa - partial view of glottis  Placement verified by: chest auscultation and capnometry   Cuff volume (mL): 8  Measured from: lips  ETT to lips (cm): 22  Number of attempts at approach: 1    Additional Comments  SIVI.  EYES TAPED CLOSED PRIOR TO DL.  INTUBATION AS CHARTED ABOVE.  APPEARS ATRAUMATIC.  NO CHANGE TO DENTITION. +ETCO2. +BBS. +CR.

## 2018-07-27 NOTE — ANESTHESIA POSTPROCEDURE EVALUATION
"Patient: Ric Roy    Procedure Summary     Date:  07/27/18 Room / Location:  [facility] OR  / [facility] MAIN OR    Anesthesia Start:  0757 Anesthesia Stop:  1102    Procedure:  C4 to C7 anterior cervical discectomy, fusion and instrumentation (N/A Spine Cervical) Diagnosis:       Cervical spinal stenosis      (Cervical spinal stenosis [M48.02])    Surgeon:  Lavon Rincon MD Provider:  Emily Ross MD    Anesthesia Type:  general ASA Status:  3          Anesthesia Type: general  Last vitals  BP   166/83 (07/27/18 1320)   Temp   36.9 °C (98.5 °F) (07/27/18 1059)   Pulse   75 (07/27/18 1320)   Resp   18 (07/27/18 1320)     SpO2   96 % (07/27/18 1320)     Post Anesthesia Care and Evaluation    Patient location during evaluation: bedside  Patient participation: complete - patient participated  Level of consciousness: sleepy but conscious  Pain score: 0  Pain management: adequate  Airway patency: patent  Anesthetic complications: No anesthetic complications    Cardiovascular status: acceptable  Respiratory status: acceptable  Hydration status: acceptable    Comments: /83   Pulse 75   Temp 36.9 °C (98.5 °F) (Oral)   Resp 18   Ht 182.9 cm (72\")   Wt 113 kg (248 lb 6.4 oz)   SpO2 96%   BMI 33.69 kg/m²         "

## 2018-07-28 ENCOUNTER — APPOINTMENT (OUTPATIENT)
Dept: GENERAL RADIOLOGY | Facility: HOSPITAL | Age: 63
End: 2018-07-28

## 2018-07-28 LAB
25(OH)D3 SERPL-MCNC: 12.7 NG/ML (ref 30–100)
ALBUMIN SERPL-MCNC: 3.4 G/DL (ref 3.5–5.2)
ALBUMIN/GLOB SERPL: 1.3 G/DL
ALP SERPL-CCNC: 28 U/L (ref 39–117)
ALT SERPL W P-5'-P-CCNC: 18 U/L (ref 1–41)
ANION GAP SERPL CALCULATED.3IONS-SCNC: 16.4 MMOL/L
AST SERPL-CCNC: 27 U/L (ref 1–40)
BASOPHILS # BLD AUTO: 0.02 10*3/MM3 (ref 0–0.2)
BASOPHILS NFR BLD AUTO: 0.1 % (ref 0–1.5)
BILIRUB SERPL-MCNC: 0.2 MG/DL (ref 0.1–1.2)
BUN BLD-MCNC: 17 MG/DL (ref 8–23)
BUN/CREAT SERPL: 11.5 (ref 7–25)
CA-I BLD-MCNC: 3.8 MG/DL (ref 4.6–5.4)
CA-I SERPL ISE-MCNC: 0.94 MMOL/L (ref 1.15–1.35)
CALCIUM SPEC-SCNC: 6.8 MG/DL (ref 8.6–10.5)
CHLORIDE SERPL-SCNC: 103 MMOL/L (ref 98–107)
CO2 SERPL-SCNC: 23.6 MMOL/L (ref 22–29)
CREAT BLD-MCNC: 1.48 MG/DL (ref 0.76–1.27)
DEPRECATED RDW RBC AUTO: 47.1 FL (ref 37–54)
EOSINOPHIL # BLD AUTO: 0.02 10*3/MM3 (ref 0–0.7)
EOSINOPHIL NFR BLD AUTO: 0.1 % (ref 0.3–6.2)
ERYTHROCYTE [DISTWIDTH] IN BLOOD BY AUTOMATED COUNT: 13.8 % (ref 11.5–14.5)
GFR SERPL CREATININE-BSD FRML MDRD: 48 ML/MIN/1.73
GLOBULIN UR ELPH-MCNC: 2.6 GM/DL
GLUCOSE BLD-MCNC: 117 MG/DL (ref 65–99)
GLUCOSE BLDC GLUCOMTR-MCNC: 125 MG/DL (ref 70–130)
GLUCOSE BLDC GLUCOMTR-MCNC: 139 MG/DL (ref 70–130)
GLUCOSE BLDC GLUCOMTR-MCNC: 143 MG/DL (ref 70–130)
GLUCOSE BLDC GLUCOMTR-MCNC: 156 MG/DL (ref 70–130)
HBA1C MFR BLD: 7.14 % (ref 4.8–5.6)
HCT VFR BLD AUTO: 39.6 % (ref 40.4–52.2)
HGB BLD-MCNC: 12.5 G/DL (ref 13.7–17.6)
IMM GRANULOCYTES # BLD: 0.06 10*3/MM3 (ref 0–0.03)
IMM GRANULOCYTES NFR BLD: 0.3 % (ref 0–0.5)
LYMPHOCYTES # BLD AUTO: 2.64 10*3/MM3 (ref 0.9–4.8)
LYMPHOCYTES NFR BLD AUTO: 14 % (ref 19.6–45.3)
MAGNESIUM SERPL-MCNC: 0.7 MG/DL (ref 1.6–2.4)
MCH RBC QN AUTO: 29.4 PG (ref 27–32.7)
MCHC RBC AUTO-ENTMCNC: 31.6 G/DL (ref 32.6–36.4)
MCV RBC AUTO: 93.2 FL (ref 79.8–96.2)
MONOCYTES # BLD AUTO: 1.37 10*3/MM3 (ref 0.2–1.2)
MONOCYTES NFR BLD AUTO: 7.2 % (ref 5–12)
NEUTROPHILS # BLD AUTO: 14.8 10*3/MM3 (ref 1.9–8.1)
NEUTROPHILS NFR BLD AUTO: 78.3 % (ref 42.7–76)
PHOSPHATE SERPL-MCNC: 4.7 MG/DL (ref 2.5–4.5)
PLATELET # BLD AUTO: 153 10*3/MM3 (ref 140–500)
PMV BLD AUTO: 13 FL (ref 6–12)
POTASSIUM BLD-SCNC: 4 MMOL/L (ref 3.5–5.2)
PROT SERPL-MCNC: 6 G/DL (ref 6–8.5)
PTH-INTACT SERPL-MCNC: 19.6 PG/ML (ref 15–65)
RBC # BLD AUTO: 4.25 10*6/MM3 (ref 4.6–6)
SODIUM BLD-SCNC: 143 MMOL/L (ref 136–145)
WBC NRBC COR # BLD: 18.91 10*3/MM3 (ref 4.5–10.7)

## 2018-07-28 PROCEDURE — 72040 X-RAY EXAM NECK SPINE 2-3 VW: CPT

## 2018-07-28 PROCEDURE — 83735 ASSAY OF MAGNESIUM: CPT | Performed by: INTERNAL MEDICINE

## 2018-07-28 PROCEDURE — 85025 COMPLETE CBC W/AUTO DIFF WBC: CPT | Performed by: NEUROLOGICAL SURGERY

## 2018-07-28 PROCEDURE — 83970 ASSAY OF PARATHORMONE: CPT | Performed by: INTERNAL MEDICINE

## 2018-07-28 PROCEDURE — 82330 ASSAY OF CALCIUM: CPT | Performed by: INTERNAL MEDICINE

## 2018-07-28 PROCEDURE — 25010000003 CEFAZOLIN IN DEXTROSE 2-4 GM/100ML-% SOLUTION: Performed by: NEUROLOGICAL SURGERY

## 2018-07-28 PROCEDURE — 82306 VITAMIN D 25 HYDROXY: CPT | Performed by: INTERNAL MEDICINE

## 2018-07-28 PROCEDURE — 83036 HEMOGLOBIN GLYCOSYLATED A1C: CPT | Performed by: INTERNAL MEDICINE

## 2018-07-28 PROCEDURE — 25010000002 MORPHINE PER 10 MG: Performed by: NEUROLOGICAL SURGERY

## 2018-07-28 PROCEDURE — 25010000002 CALCIUM GLUCONATE PER 10 ML: Performed by: INTERNAL MEDICINE

## 2018-07-28 PROCEDURE — 80053 COMPREHEN METABOLIC PANEL: CPT | Performed by: INTERNAL MEDICINE

## 2018-07-28 PROCEDURE — G0378 HOSPITAL OBSERVATION PER HR: HCPCS

## 2018-07-28 PROCEDURE — 84100 ASSAY OF PHOSPHORUS: CPT | Performed by: INTERNAL MEDICINE

## 2018-07-28 PROCEDURE — 25010000002 MAGNESIUM SULFATE 2 GM/50ML SOLUTION: Performed by: INTERNAL MEDICINE

## 2018-07-28 PROCEDURE — 82962 GLUCOSE BLOOD TEST: CPT

## 2018-07-28 RX ORDER — CALCIUM GLUCONATE 94 MG/ML
2 INJECTION, SOLUTION INTRAVENOUS ONCE
Status: DISCONTINUED | OUTPATIENT
Start: 2018-07-28 | End: 2018-07-28 | Stop reason: SDUPTHER

## 2018-07-28 RX ORDER — IBUPROFEN 200 MG
1250 CAPSULE ORAL 3 TIMES DAILY
Status: DISCONTINUED | OUTPATIENT
Start: 2018-07-28 | End: 2018-07-28

## 2018-07-28 RX ORDER — LISINOPRIL 40 MG/1
40 TABLET ORAL EVERY MORNING
Status: DISCONTINUED | OUTPATIENT
Start: 2018-07-29 | End: 2018-07-30 | Stop reason: HOSPADM

## 2018-07-28 RX ORDER — MAGNESIUM SULFATE HEPTAHYDRATE 40 MG/ML
6 INJECTION, SOLUTION INTRAVENOUS ONCE
Status: COMPLETED | OUTPATIENT
Start: 2018-07-28 | End: 2018-07-28

## 2018-07-28 RX ORDER — MELATONIN
1000 DAILY
Status: DISCONTINUED | OUTPATIENT
Start: 2018-07-28 | End: 2018-07-30 | Stop reason: HOSPADM

## 2018-07-28 RX ADMIN — HYDROCODONE BITARTRATE AND ACETAMINOPHEN 1 TABLET: 5; 325 TABLET ORAL at 10:58

## 2018-07-28 RX ADMIN — CALCIUM GLUCONATE 2 G: 98 INJECTION, SOLUTION INTRAVENOUS at 20:38

## 2018-07-28 RX ADMIN — VITAMIN D, TAB 1000IU (100/BT) 1000 UNITS: 25 TAB at 16:44

## 2018-07-28 RX ADMIN — LISINOPRIL 10 MG: 10 TABLET ORAL at 06:51

## 2018-07-28 RX ADMIN — GABAPENTIN 300 MG: 300 CAPSULE ORAL at 20:37

## 2018-07-28 RX ADMIN — MORPHINE SULFATE 2 MG: 2 INJECTION, SOLUTION INTRAMUSCULAR; INTRAVENOUS at 22:40

## 2018-07-28 RX ADMIN — HYDROCODONE BITARTRATE AND ACETAMINOPHEN 1 TABLET: 5; 325 TABLET ORAL at 04:47

## 2018-07-28 RX ADMIN — CEFAZOLIN SODIUM 2 G: 2 INJECTION, SOLUTION INTRAVENOUS at 10:01

## 2018-07-28 RX ADMIN — GABAPENTIN 300 MG: 300 CAPSULE ORAL at 16:45

## 2018-07-28 RX ADMIN — SODIUM CHLORIDE 100 ML/HR: 9 INJECTION, SOLUTION INTRAVENOUS at 06:53

## 2018-07-28 RX ADMIN — PIOGLITAZONE 30 MG: 30 TABLET ORAL at 06:51

## 2018-07-28 RX ADMIN — GABAPENTIN 300 MG: 300 CAPSULE ORAL at 10:10

## 2018-07-28 RX ADMIN — ATENOLOL 25 MG: 25 TABLET ORAL at 20:37

## 2018-07-28 RX ADMIN — CEFAZOLIN SODIUM 2 G: 2 INJECTION, SOLUTION INTRAVENOUS at 02:38

## 2018-07-28 RX ADMIN — CEFAZOLIN SODIUM 2 G: 2 INJECTION, SOLUTION INTRAVENOUS at 18:31

## 2018-07-28 RX ADMIN — SODIUM CHLORIDE 100 ML/HR: 9 INJECTION, SOLUTION INTRAVENOUS at 23:53

## 2018-07-28 RX ADMIN — MORPHINE SULFATE 2 MG: 2 INJECTION, SOLUTION INTRAMUSCULAR; INTRAVENOUS at 18:40

## 2018-07-28 RX ADMIN — MAGNESIUM SULFATE HEPTAHYDRATE 6 G: 40 INJECTION, SOLUTION INTRAVENOUS at 10:00

## 2018-07-28 RX ADMIN — HYDROCODONE BITARTRATE AND ACETAMINOPHEN 1 TABLET: 5; 325 TABLET ORAL at 16:45

## 2018-07-28 RX ADMIN — MORPHINE SULFATE 2 MG: 2 INJECTION, SOLUTION INTRAMUSCULAR; INTRAVENOUS at 04:13

## 2018-07-29 LAB
ANION GAP SERPL CALCULATED.3IONS-SCNC: 13.5 MMOL/L
BASOPHILS # BLD AUTO: 0.03 10*3/MM3 (ref 0–0.2)
BASOPHILS NFR BLD AUTO: 0.2 % (ref 0–1.5)
BUN BLD-MCNC: 14 MG/DL (ref 8–23)
BUN/CREAT SERPL: 10.6 (ref 7–25)
CA-I BLD-MCNC: 4.2 MG/DL (ref 4.6–5.4)
CA-I SERPL ISE-MCNC: 1.04 MMOL/L (ref 1.15–1.35)
CALCIUM SPEC-SCNC: 6.9 MG/DL (ref 8.6–10.5)
CHLORIDE SERPL-SCNC: 105 MMOL/L (ref 98–107)
CO2 SERPL-SCNC: 24.5 MMOL/L (ref 22–29)
CREAT BLD-MCNC: 1.32 MG/DL (ref 0.76–1.27)
DEPRECATED RDW RBC AUTO: 47.1 FL (ref 37–54)
EOSINOPHIL # BLD AUTO: 0.04 10*3/MM3 (ref 0–0.7)
EOSINOPHIL NFR BLD AUTO: 0.3 % (ref 0.3–6.2)
ERYTHROCYTE [DISTWIDTH] IN BLOOD BY AUTOMATED COUNT: 14 % (ref 11.5–14.5)
GFR SERPL CREATININE-BSD FRML MDRD: 55 ML/MIN/1.73
GLUCOSE BLD-MCNC: 130 MG/DL (ref 65–99)
GLUCOSE BLDC GLUCOMTR-MCNC: 123 MG/DL (ref 70–130)
GLUCOSE BLDC GLUCOMTR-MCNC: 133 MG/DL (ref 70–130)
GLUCOSE BLDC GLUCOMTR-MCNC: 137 MG/DL (ref 70–130)
GLUCOSE BLDC GLUCOMTR-MCNC: 157 MG/DL (ref 70–130)
HCT VFR BLD AUTO: 38.1 % (ref 40.4–52.2)
HGB BLD-MCNC: 12.4 G/DL (ref 13.7–17.6)
IMM GRANULOCYTES # BLD: 0.06 10*3/MM3 (ref 0–0.03)
IMM GRANULOCYTES NFR BLD: 0.4 % (ref 0–0.5)
LYMPHOCYTES # BLD AUTO: 1.46 10*3/MM3 (ref 0.9–4.8)
LYMPHOCYTES NFR BLD AUTO: 9.2 % (ref 19.6–45.3)
MAGNESIUM SERPL-MCNC: 2.4 MG/DL (ref 1.6–2.4)
MCH RBC QN AUTO: 30 PG (ref 27–32.7)
MCHC RBC AUTO-ENTMCNC: 32.5 G/DL (ref 32.6–36.4)
MCV RBC AUTO: 92 FL (ref 79.8–96.2)
MONOCYTES # BLD AUTO: 1.17 10*3/MM3 (ref 0.2–1.2)
MONOCYTES NFR BLD AUTO: 7.4 % (ref 5–12)
NEUTROPHILS # BLD AUTO: 13.12 10*3/MM3 (ref 1.9–8.1)
NEUTROPHILS NFR BLD AUTO: 82.9 % (ref 42.7–76)
PHOSPHATE SERPL-MCNC: 2.3 MG/DL (ref 2.5–4.5)
PLATELET # BLD AUTO: 151 10*3/MM3 (ref 140–500)
PMV BLD AUTO: 12.6 FL (ref 6–12)
POTASSIUM BLD-SCNC: 4.2 MMOL/L (ref 3.5–5.2)
RBC # BLD AUTO: 4.14 10*6/MM3 (ref 4.6–6)
SODIUM BLD-SCNC: 143 MMOL/L (ref 136–145)
WBC NRBC COR # BLD: 15.82 10*3/MM3 (ref 4.5–10.7)

## 2018-07-29 PROCEDURE — 85025 COMPLETE CBC W/AUTO DIFF WBC: CPT | Performed by: INTERNAL MEDICINE

## 2018-07-29 PROCEDURE — 83735 ASSAY OF MAGNESIUM: CPT | Performed by: INTERNAL MEDICINE

## 2018-07-29 PROCEDURE — 80048 BASIC METABOLIC PNL TOTAL CA: CPT | Performed by: INTERNAL MEDICINE

## 2018-07-29 PROCEDURE — 25010000003 CEFAZOLIN IN DEXTROSE 2-4 GM/100ML-% SOLUTION: Performed by: NEUROLOGICAL SURGERY

## 2018-07-29 PROCEDURE — 82962 GLUCOSE BLOOD TEST: CPT

## 2018-07-29 PROCEDURE — 25010000002 CALCIUM GLUCONATE PER 10 ML: Performed by: INTERNAL MEDICINE

## 2018-07-29 PROCEDURE — 82330 ASSAY OF CALCIUM: CPT | Performed by: INTERNAL MEDICINE

## 2018-07-29 PROCEDURE — 84100 ASSAY OF PHOSPHORUS: CPT | Performed by: INTERNAL MEDICINE

## 2018-07-29 PROCEDURE — 25010000002 MORPHINE PER 10 MG: Performed by: NEUROLOGICAL SURGERY

## 2018-07-29 PROCEDURE — 63710000001 METHYLPREDNISOLONE 4 MG TABLET THERAPY PACK 21 EACH DISP PACK: Performed by: NEUROLOGICAL SURGERY

## 2018-07-29 RX ORDER — METHYLPREDNISOLONE 4 MG/1
8 TABLET ORAL
Status: COMPLETED | OUTPATIENT
Start: 2018-07-29 | End: 2018-07-29

## 2018-07-29 RX ORDER — METHYLPREDNISOLONE 4 MG/1
4 TABLET ORAL
Status: DISCONTINUED | OUTPATIENT
Start: 2018-07-29 | End: 2018-07-30 | Stop reason: HOSPADM

## 2018-07-29 RX ORDER — METHYLPREDNISOLONE 4 MG/1
4 TABLET ORAL 2 TIMES DAILY
Status: DISCONTINUED | OUTPATIENT
Start: 2018-08-02 | End: 2018-07-30 | Stop reason: HOSPADM

## 2018-07-29 RX ORDER — CALCIUM GLUCONATE 94 MG/ML
2 INJECTION, SOLUTION INTRAVENOUS ONCE
Status: DISCONTINUED | OUTPATIENT
Start: 2018-07-29 | End: 2018-07-29

## 2018-07-29 RX ORDER — METHYLPREDNISOLONE 4 MG/1
4 TABLET ORAL
Status: DISCONTINUED | OUTPATIENT
Start: 2018-08-03 | End: 2018-07-30 | Stop reason: HOSPADM

## 2018-07-29 RX ORDER — METHYLPREDNISOLONE 4 MG/1
8 TABLET ORAL
Status: DISCONTINUED | OUTPATIENT
Start: 2018-07-30 | End: 2018-07-30 | Stop reason: HOSPADM

## 2018-07-29 RX ORDER — METHYLPREDNISOLONE 4 MG/1
4 TABLET ORAL
Status: COMPLETED | OUTPATIENT
Start: 2018-07-29 | End: 2018-07-29

## 2018-07-29 RX ORDER — METHYLPREDNISOLONE 4 MG/1
4 TABLET ORAL
Status: DISCONTINUED | OUTPATIENT
Start: 2018-07-31 | End: 2018-07-30 | Stop reason: HOSPADM

## 2018-07-29 RX ORDER — LISINOPRIL 40 MG/1
40 TABLET ORAL EVERY MORNING
Qty: 30 TABLET | Refills: 0 | Status: SHIPPED | OUTPATIENT
Start: 2018-07-29

## 2018-07-29 RX ORDER — METHYLPREDNISOLONE 4 MG/1
4 TABLET ORAL
Status: DISCONTINUED | OUTPATIENT
Start: 2018-07-30 | End: 2018-07-30 | Stop reason: HOSPADM

## 2018-07-29 RX ORDER — METHYLPREDNISOLONE 4 MG/1
4 TABLET ORAL
Status: DISCONTINUED | OUTPATIENT
Start: 2018-08-01 | End: 2018-07-30 | Stop reason: HOSPADM

## 2018-07-29 RX ADMIN — GABAPENTIN 300 MG: 300 CAPSULE ORAL at 20:11

## 2018-07-29 RX ADMIN — METHYLPREDNISOLONE 8 MG: 4 TABLET ORAL at 20:10

## 2018-07-29 RX ADMIN — CEFAZOLIN SODIUM 2 G: 2 INJECTION, SOLUTION INTRAVENOUS at 10:01

## 2018-07-29 RX ADMIN — MORPHINE SULFATE 2 MG: 2 INJECTION, SOLUTION INTRAMUSCULAR; INTRAVENOUS at 02:58

## 2018-07-29 RX ADMIN — ATENOLOL 25 MG: 25 TABLET ORAL at 20:10

## 2018-07-29 RX ADMIN — MORPHINE SULFATE 2 MG: 2 INJECTION, SOLUTION INTRAMUSCULAR; INTRAVENOUS at 12:01

## 2018-07-29 RX ADMIN — GABAPENTIN 300 MG: 300 CAPSULE ORAL at 08:22

## 2018-07-29 RX ADMIN — CALCIUM GLUCONATE 2 G: 98 INJECTION, SOLUTION INTRAVENOUS at 12:01

## 2018-07-29 RX ADMIN — POTASSIUM & SODIUM PHOSPHATES POWDER PACK 280-160-250 MG 1 PACKET: 280-160-250 PACK at 12:01

## 2018-07-29 RX ADMIN — METHYLPREDNISOLONE 8 MG: 4 TABLET ORAL at 13:30

## 2018-07-29 RX ADMIN — MORPHINE SULFATE 2 MG: 2 INJECTION, SOLUTION INTRAMUSCULAR; INTRAVENOUS at 07:29

## 2018-07-29 RX ADMIN — MORPHINE SULFATE 2 MG: 2 INJECTION, SOLUTION INTRAMUSCULAR; INTRAVENOUS at 16:08

## 2018-07-29 RX ADMIN — CEFAZOLIN SODIUM 2 G: 2 INJECTION, SOLUTION INTRAVENOUS at 02:58

## 2018-07-29 RX ADMIN — METHYLPREDNISOLONE 4 MG: 4 TABLET ORAL at 16:21

## 2018-07-29 RX ADMIN — POTASSIUM & SODIUM PHOSPHATES POWDER PACK 280-160-250 MG 1 PACKET: 280-160-250 PACK at 17:22

## 2018-07-29 RX ADMIN — LISINOPRIL 40 MG: 40 TABLET ORAL at 08:26

## 2018-07-29 RX ADMIN — MORPHINE SULFATE 2 MG: 2 INJECTION, SOLUTION INTRAMUSCULAR; INTRAVENOUS at 20:10

## 2018-07-29 RX ADMIN — GABAPENTIN 300 MG: 300 CAPSULE ORAL at 16:20

## 2018-07-30 VITALS
HEIGHT: 72 IN | SYSTOLIC BLOOD PRESSURE: 180 MMHG | RESPIRATION RATE: 18 BRPM | DIASTOLIC BLOOD PRESSURE: 76 MMHG | TEMPERATURE: 98.6 F | HEART RATE: 66 BPM | BODY MASS INDEX: 33.64 KG/M2 | WEIGHT: 248.4 LBS | OXYGEN SATURATION: 96 %

## 2018-07-30 LAB
ALBUMIN SERPL-MCNC: 3.6 G/DL (ref 3.5–5.2)
ANION GAP SERPL CALCULATED.3IONS-SCNC: 13.2 MMOL/L
BASOPHILS # BLD AUTO: 0.02 10*3/MM3 (ref 0–0.2)
BASOPHILS NFR BLD AUTO: 0.1 % (ref 0–1.5)
BUN BLD-MCNC: 13 MG/DL (ref 8–23)
BUN/CREAT SERPL: 11.9 (ref 7–25)
CALCIUM SPEC-SCNC: 7.4 MG/DL (ref 8.6–10.5)
CHLORIDE SERPL-SCNC: 103 MMOL/L (ref 98–107)
CO2 SERPL-SCNC: 22.8 MMOL/L (ref 22–29)
CREAT BLD-MCNC: 1.09 MG/DL (ref 0.76–1.27)
DEPRECATED RDW RBC AUTO: 45.9 FL (ref 37–54)
EOSINOPHIL # BLD AUTO: 0.03 10*3/MM3 (ref 0–0.7)
EOSINOPHIL NFR BLD AUTO: 0.2 % (ref 0.3–6.2)
ERYTHROCYTE [DISTWIDTH] IN BLOOD BY AUTOMATED COUNT: 13.5 % (ref 11.5–14.5)
GFR SERPL CREATININE-BSD FRML MDRD: 68 ML/MIN/1.73
GLUCOSE BLD-MCNC: 126 MG/DL (ref 65–99)
GLUCOSE BLDC GLUCOMTR-MCNC: 130 MG/DL (ref 70–130)
GLUCOSE BLDC GLUCOMTR-MCNC: 150 MG/DL (ref 70–130)
HCT VFR BLD AUTO: 38.7 % (ref 40.4–52.2)
HGB BLD-MCNC: 12.1 G/DL (ref 13.7–17.6)
IMM GRANULOCYTES # BLD: 0.05 10*3/MM3 (ref 0–0.03)
IMM GRANULOCYTES NFR BLD: 0.3 % (ref 0–0.5)
LYMPHOCYTES # BLD AUTO: 1.83 10*3/MM3 (ref 0.9–4.8)
LYMPHOCYTES NFR BLD AUTO: 11.1 % (ref 19.6–45.3)
MAGNESIUM SERPL-MCNC: 2.5 MG/DL (ref 1.6–2.4)
MCH RBC QN AUTO: 29.2 PG (ref 27–32.7)
MCHC RBC AUTO-ENTMCNC: 31.3 G/DL (ref 32.6–36.4)
MCV RBC AUTO: 93.3 FL (ref 79.8–96.2)
MONOCYTES # BLD AUTO: 1.37 10*3/MM3 (ref 0.2–1.2)
MONOCYTES NFR BLD AUTO: 8.3 % (ref 5–12)
NEUTROPHILS # BLD AUTO: 13.23 10*3/MM3 (ref 1.9–8.1)
NEUTROPHILS NFR BLD AUTO: 80 % (ref 42.7–76)
PHOSPHATE SERPL-MCNC: 1.8 MG/DL (ref 2.5–4.5)
PLATELET # BLD AUTO: 158 10*3/MM3 (ref 140–500)
PMV BLD AUTO: 12.4 FL (ref 6–12)
POTASSIUM BLD-SCNC: 4.6 MMOL/L (ref 3.5–5.2)
RBC # BLD AUTO: 4.15 10*6/MM3 (ref 4.6–6)
SODIUM BLD-SCNC: 139 MMOL/L (ref 136–145)
WBC NRBC COR # BLD: 16.53 10*3/MM3 (ref 4.5–10.7)

## 2018-07-30 PROCEDURE — 25010000002 CALCIUM GLUCONATE PER 10 ML: Performed by: INTERNAL MEDICINE

## 2018-07-30 PROCEDURE — 63710000001 METHYLPREDNISOLONE 4 MG TABLET THERAPY PACK 21 EACH DISP PACK: Performed by: NEUROLOGICAL SURGERY

## 2018-07-30 PROCEDURE — 83735 ASSAY OF MAGNESIUM: CPT | Performed by: INTERNAL MEDICINE

## 2018-07-30 PROCEDURE — 85025 COMPLETE CBC W/AUTO DIFF WBC: CPT | Performed by: NEUROLOGICAL SURGERY

## 2018-07-30 PROCEDURE — 80069 RENAL FUNCTION PANEL: CPT | Performed by: INTERNAL MEDICINE

## 2018-07-30 PROCEDURE — 82962 GLUCOSE BLOOD TEST: CPT

## 2018-07-30 PROCEDURE — 25010000002 MORPHINE PER 10 MG: Performed by: NEUROLOGICAL SURGERY

## 2018-07-30 RX ORDER — METHYLPREDNISOLONE 4 MG/1
TABLET ORAL
Start: 2018-08-03 | End: 2018-07-31

## 2018-07-30 RX ORDER — CALCIUM GLUCONATE 94 MG/ML
2 INJECTION, SOLUTION INTRAVENOUS ONCE
Status: DISCONTINUED | OUTPATIENT
Start: 2018-07-30 | End: 2018-07-30 | Stop reason: SDUPTHER

## 2018-07-30 RX ORDER — CEPHALEXIN 500 MG/1
500 CAPSULE ORAL 4 TIMES DAILY
Qty: 20 CAPSULE | Refills: 0
Start: 2018-07-30 | End: 2018-08-04

## 2018-07-30 RX ORDER — AMLODIPINE BESYLATE 10 MG/1
10 TABLET ORAL
Qty: 30 TABLET | Refills: 2 | Status: SHIPPED | OUTPATIENT
Start: 2018-07-30

## 2018-07-30 RX ORDER — METHYLPREDNISOLONE 4 MG/1
TABLET ORAL
Start: 2018-07-30 | End: 2018-07-31

## 2018-07-30 RX ORDER — AMLODIPINE BESYLATE 10 MG/1
10 TABLET ORAL
Status: DISCONTINUED | OUTPATIENT
Start: 2018-07-30 | End: 2018-07-30 | Stop reason: HOSPADM

## 2018-07-30 RX ORDER — HYDROCODONE BITARTRATE AND ACETAMINOPHEN 5; 325 MG/1; MG/1
1 TABLET ORAL EVERY 4 HOURS PRN
Qty: 35 TABLET | Refills: 0
Start: 2018-07-30 | End: 2018-08-06

## 2018-07-30 RX ORDER — METHYLPREDNISOLONE 4 MG/1
TABLET ORAL
Start: 2018-08-02 | End: 2018-07-31

## 2018-07-30 RX ORDER — METHYLPREDNISOLONE 4 MG/1
TABLET ORAL
Start: 2018-07-31 | End: 2018-07-31

## 2018-07-30 RX ORDER — METHYLPREDNISOLONE 4 MG/1
TABLET ORAL
Start: 2018-08-01 | End: 2018-07-31 | Stop reason: SDUPTHER

## 2018-07-30 RX ADMIN — METHYLPREDNISOLONE 4 MG: 4 TABLET ORAL at 06:43

## 2018-07-30 RX ADMIN — VITAMIN D, TAB 1000IU (100/BT) 1000 UNITS: 25 TAB at 09:19

## 2018-07-30 RX ADMIN — CALCIUM GLUCONATE 2 G: 98 INJECTION, SOLUTION INTRAVENOUS at 13:29

## 2018-07-30 RX ADMIN — GABAPENTIN 300 MG: 300 CAPSULE ORAL at 09:19

## 2018-07-30 RX ADMIN — POTASSIUM PHOSPHATE, MONOBASIC AND POTASSIUM PHOSPHATE, DIBASIC 20 MMOL: 224; 236 INJECTION, SOLUTION, CONCENTRATE INTRAVENOUS at 16:36

## 2018-07-30 RX ADMIN — LISINOPRIL 40 MG: 40 TABLET ORAL at 06:43

## 2018-07-30 RX ADMIN — METHYLPREDNISOLONE 4 MG: 4 TABLET ORAL at 13:16

## 2018-07-30 RX ADMIN — HYDROCODONE BITARTRATE AND ACETAMINOPHEN 1 TABLET: 5; 325 TABLET ORAL at 09:19

## 2018-07-30 RX ADMIN — PIOGLITAZONE 30 MG: 30 TABLET ORAL at 06:43

## 2018-07-30 RX ADMIN — MORPHINE SULFATE 2 MG: 2 INJECTION, SOLUTION INTRAMUSCULAR; INTRAVENOUS at 00:44

## 2018-07-30 RX ADMIN — HYDROCODONE BITARTRATE AND ACETAMINOPHEN 1 TABLET: 5; 325 TABLET ORAL at 13:18

## 2018-07-30 RX ADMIN — MORPHINE SULFATE 2 MG: 2 INJECTION, SOLUTION INTRAMUSCULAR; INTRAVENOUS at 04:51

## 2018-07-30 RX ADMIN — METFORMIN HYDROCHLORIDE: 500 TABLET, EXTENDED RELEASE ORAL at 13:30

## 2018-07-31 ENCOUNTER — TELEPHONE (OUTPATIENT)
Dept: NEUROSURGERY | Facility: CLINIC | Age: 63
End: 2018-07-31

## 2018-07-31 DIAGNOSIS — M48.02 CERVICAL SPINAL STENOSIS: Primary | ICD-10-CM

## 2018-07-31 RX ORDER — METHYLPREDNISOLONE 4 MG/1
TABLET ORAL
Qty: 21 TABLET | Refills: 0 | Status: SHIPPED | OUTPATIENT
Start: 2018-08-01 | End: 2018-08-14 | Stop reason: HOSPADM

## 2018-07-31 NOTE — TELEPHONE ENCOUNTER
Per Dr. Rincon it is okay to order RX. Called patient and let them know that RX is being sent over to pharmacy.

## 2018-07-31 NOTE — TELEPHONE ENCOUNTER
Patient's wife called stating that the pharmacy has not received any refill order for the MDP for her  Ric. Please call and advise.

## 2018-08-07 NOTE — PROGRESS NOTES
Subjective   Patient ID: Ric Roy is a 63 y.o. male is here today for follow-up. Mr. Roy is 2 and a half weeks out from anterior cervical discectomy C4 5, C5 6 and C6 7 with fusion and instrumentation by Dr. Rincon on 07/27/2018. Patient is currently taking Neurontin 300 mg TID for pain. He denies any incisonal problems or fever. He is wearing the [place] J collar and he is using his bone growth stimulator as directed.       Mr. Roy is here for 2 week follow-up from 3 level anterior cervical discectomy and fusion procedure with Dr. Rincon.  He does have some hoarseness but denies any significant swallowing difficulty.  He has had no choking episodes.  He reports improvement in the arm pain.          Review of Systems   Respiratory: Negative for chest tightness and shortness of breath.    Cardiovascular: Negative for chest pain.   Musculoskeletal: Positive for neck pain.   All other systems reviewed and are negative.      Objective   Physical Exam   Constitutional: He appears well-developed. No distress.   Skin: Skin is warm and dry.   Cervical anterior incision is well approximated. No redness, swelling or drainage.    Psychiatric: He has a normal mood and affect.   Vitals reviewed.    Neurologic Exam    Assessment/Plan   Independent Review of Radiographic Studies:      I reviewed the post operative cervical spine images with the patient and his wife.  The x-ray show good alignment without evidence of hardware complication.    Medical Decision Making:      Mr. Roy is doing well.  He understands it will take time for the voice to improve.  The anterior cervical incision is well approximated.  There is minimal swelling beneath the incision as expected.  No redness, swelling, or drainage.  The patient will continue to wear his hard cervical collar at all times except to shower.  Walking was encouraged.  He will continue wearing the bone growth stimulator as directed.  He was reminded again about the importance  of smoking cessation for optimal bony fusion of the cervical spine.  Continuing to smoke will inhibit adequate fusion.  He verbalized understanding.      Mr. Roy will return to the office in one month with repeat cervical x-rays.  He will call if he has any worsening symptoms in the meantime.      Ric was seen today for post-op.    Diagnoses and all orders for this visit:    Surgical followup visit  -     XR Spine Cervical 2 View; Future      Return in about 1 month (around 9/14/2018), or with new Xrays with Dr. Rincon or a day when Dr. Rincon is here. .

## 2018-08-14 ENCOUNTER — OFFICE VISIT (OUTPATIENT)
Dept: NEUROSURGERY | Facility: CLINIC | Age: 63
End: 2018-08-14

## 2018-08-14 VITALS
DIASTOLIC BLOOD PRESSURE: 81 MMHG | HEIGHT: 72 IN | HEART RATE: 71 BPM | BODY MASS INDEX: 33.59 KG/M2 | WEIGHT: 248 LBS | SYSTOLIC BLOOD PRESSURE: 159 MMHG

## 2018-08-14 DIAGNOSIS — Z09 SURGICAL FOLLOWUP VISIT: Primary | ICD-10-CM

## 2018-08-14 PROCEDURE — 99024 POSTOP FOLLOW-UP VISIT: CPT | Performed by: NURSE PRACTITIONER

## 2018-09-14 ENCOUNTER — HOSPITAL ENCOUNTER (OUTPATIENT)
Dept: GENERAL RADIOLOGY | Facility: HOSPITAL | Age: 63
Discharge: HOME | End: 2018-09-14
Admitting: NURSE PRACTITIONER

## 2018-09-14 DIAGNOSIS — Z09 SURGICAL FOLLOWUP VISIT: ICD-10-CM

## 2018-09-14 PROCEDURE — 72040 X-RAY EXAM NECK SPINE 2-3 VW: CPT

## 2018-09-14 NOTE — PROGRESS NOTES
Subjective   Patient ID: Ric Roy is a 63 y.o. male is here today for follow-up with a new Cervical XR. He is 8 weeks out from anterior cervical discectomy C4 5, C5 6 and C6 7 with fusion and instrumentation done on 07/27/2018.  He does have some neck pain. He has some mild burning in upper arms.    History of Present Illness     This patient returns today.  He is doing well.  He has a little bit of stiffness in his upper arms and he is still fairly horse.  His incision has healed well area    The following portions of the patient's history were reviewed and updated as appropriate: allergies, current medications, past family history, past medical history, past social history, past surgical history and problem list.    Review of Systems   Respiratory: Negative for chest tightness and shortness of breath.    Cardiovascular: Negative for chest pain.   Musculoskeletal: Positive for neck pain.   All other systems reviewed and are negative.      Objective   Physical Exam   Constitutional: He is oriented to person, place, and time. He appears well-developed and well-nourished.   Neurological: He is oriented to person, place, and time.     Neurologic Exam     Mental Status   Oriented to person, place, and time.       Assessment/Plan   Independent Review of Radiographic Studies:      I reviewed his x-rays that were done on 14 September.  The show good alignment of the construct at C4 5, C5 6 and C6 7.    Medical Decision Making:      I told the patient that he can take his collar off when he is sleeping and when he is just sitting watching TV.  Otherwise he should wear it.  I will see him back in about 6 weeks with another x-ray and if that looks okay then we can probably take him out of his collar.    Ric was seen today for post-op and neck pain.    Diagnoses and all orders for this visit:    Follow-up examination following surgery  -     XR Spine Cervical Complete With Flex Ext; Future      Return in about 6  weeks (around 10/30/2018).

## 2018-09-18 ENCOUNTER — OFFICE VISIT (OUTPATIENT)
Dept: NEUROSURGERY | Facility: CLINIC | Age: 63
End: 2018-09-18

## 2018-09-18 VITALS — DIASTOLIC BLOOD PRESSURE: 89 MMHG | SYSTOLIC BLOOD PRESSURE: 151 MMHG | HEART RATE: 80 BPM

## 2018-09-18 DIAGNOSIS — Z09 FOLLOW-UP EXAMINATION FOLLOWING SURGERY: Primary | ICD-10-CM

## 2018-09-18 PROCEDURE — 99024 POSTOP FOLLOW-UP VISIT: CPT | Performed by: NEUROLOGICAL SURGERY

## 2018-10-10 NOTE — PROGRESS NOTES
Subjective   Patient ID: Ric Roy is a 63 y.o. male is here today for follow-up with a new Cervical XR. He is a little over 3 months out from anterior cervical discectomy C4 5, C5 6 and C6 7 with fusion and instrumentation done on 07/27/2018.  He does have some neck pain.     History of Present Illness    This patient returns today.  He says he has no pain at all.  He still does have some hoarseness in his voice however.  His incision looks great.    The following portions of the patient's history were reviewed and updated as appropriate: allergies, current medications, past family history, past medical history, past social history, past surgical history and problem list.    Review of Systems   Respiratory: Negative for chest tightness and shortness of breath.    Cardiovascular: Negative for chest pain.   Musculoskeletal: Positive for neck pain.   All other systems reviewed and are negative.      Objective   Physical Exam   Constitutional: He is oriented to person, place, and time. He appears well-developed and well-nourished.   Neurological: He is oriented to person, place, and time.     Neurologic Exam     Mental Status   Oriented to person, place, and time.       Assessment/Plan   Independent Review of Radiographic Studies:      I reviewed his x-rays that were done on 25 October.  These look fine with no evidence of malalignment or failure of the construct.    Medical Decision Making:      I told the patient he can come out of the collar at this point.  As long as his range of motion is okay he can start driving.  He is to call the office for any problems otherwise we will check him again in about 3 months with another x-ray.  Told him I thought his voice would improve further from here.  I cannot guarantee it but I am reasonably certain of it.    Ric was seen today for neck pain.    Diagnoses and all orders for this visit:    Cervical spinal stenosis  -     XR Spine Cervical Complete With Flex Ext;  Future      Return in about 3 months (around 1/30/2019).

## 2018-10-25 ENCOUNTER — HOSPITAL ENCOUNTER (OUTPATIENT)
Dept: GENERAL RADIOLOGY | Facility: HOSPITAL | Age: 63
Discharge: HOME | End: 2018-10-25
Attending: NEUROLOGICAL SURGERY | Admitting: NEUROLOGICAL SURGERY

## 2018-10-25 DIAGNOSIS — Z09 FOLLOW-UP EXAMINATION FOLLOWING SURGERY: ICD-10-CM

## 2018-10-25 PROCEDURE — 72052 X-RAY EXAM NECK SPINE 6/>VWS: CPT

## 2018-10-30 ENCOUNTER — OFFICE VISIT (OUTPATIENT)
Dept: NEUROSURGERY | Facility: CLINIC | Age: 63
End: 2018-10-30

## 2018-10-30 VITALS
DIASTOLIC BLOOD PRESSURE: 85 MMHG | HEIGHT: 72 IN | SYSTOLIC BLOOD PRESSURE: 151 MMHG | BODY MASS INDEX: 33.59 KG/M2 | WEIGHT: 248 LBS | HEART RATE: 73 BPM

## 2018-10-30 DIAGNOSIS — M48.02 CERVICAL SPINAL STENOSIS: Primary | ICD-10-CM

## 2018-10-30 PROCEDURE — 99213 OFFICE O/P EST LOW 20 MIN: CPT | Performed by: NEUROLOGICAL SURGERY

## 2018-10-30 RX ORDER — HYDROCODONE BITARTRATE AND ACETAMINOPHEN 7.5; 325 MG/1; MG/1
1 TABLET ORAL EVERY 6 HOURS PRN
Refills: 0 | COMMUNITY
Start: 2018-08-10

## 2018-10-30 RX ORDER — OMEPRAZOLE 40 MG/1
CAPSULE, DELAYED RELEASE ORAL
COMMUNITY
Start: 2018-10-28

## 2018-10-30 RX ORDER — FENOFIBRATE 134 MG/1
CAPSULE ORAL
COMMUNITY
Start: 2018-10-28

## 2019-01-01 NOTE — TELEPHONE ENCOUNTER
Are you having any pain? Where? Both arms, worse when coughing    Are you taking the pain RX? Yes, has taken 1 1/2 pills at a time when it's really bad    Do you think it's helping?  yes    Do you feel better than before surgery?  Not yet    Was your dressing clean and dry? yes    Dermabond OK? yes    Is you incision red, swollen or bleeding? Incision is covered but will check tomorrow and let us know if it looks odd    Are you having any trouble with nausea or constipation? none    Were your discharge instructions easy to understand? yes    Any other questions?    Confirm post op appt -  yes   Yes

## 2019-01-25 ENCOUNTER — HOSPITAL ENCOUNTER (OUTPATIENT)
Dept: GENERAL RADIOLOGY | Facility: HOSPITAL | Age: 64
Discharge: HOME | End: 2019-01-25
Attending: NEUROLOGICAL SURGERY | Admitting: NEUROLOGICAL SURGERY

## 2019-01-25 DIAGNOSIS — M48.02 CERVICAL SPINAL STENOSIS: ICD-10-CM

## 2019-01-25 PROCEDURE — 72052 X-RAY EXAM NECK SPINE 6/>VWS: CPT

## 2019-01-31 ENCOUNTER — OFFICE VISIT (OUTPATIENT)
Dept: NEUROSURGERY | Facility: CLINIC | Age: 64
End: 2019-01-31

## 2019-01-31 VITALS — SYSTOLIC BLOOD PRESSURE: 169 MMHG | DIASTOLIC BLOOD PRESSURE: 87 MMHG | HEART RATE: 68 BPM

## 2019-01-31 DIAGNOSIS — M48.02 CERVICAL SPINAL STENOSIS: Primary | ICD-10-CM

## 2019-01-31 PROCEDURE — 99213 OFFICE O/P EST LOW 20 MIN: CPT | Performed by: NEUROLOGICAL SURGERY

## 2019-07-22 ENCOUNTER — HOSPITAL ENCOUNTER (OUTPATIENT)
Dept: GENERAL RADIOLOGY | Facility: HOSPITAL | Age: 64
Discharge: HOME | End: 2019-07-22
Admitting: NEUROLOGICAL SURGERY

## 2019-07-22 DIAGNOSIS — M48.02 CERVICAL SPINAL STENOSIS: ICD-10-CM

## 2019-07-22 PROCEDURE — 72052 X-RAY EXAM NECK SPINE 6/>VWS: CPT

## 2019-07-25 ENCOUNTER — OFFICE VISIT (OUTPATIENT)
Dept: NEUROSURGERY | Facility: CLINIC | Age: 64
End: 2019-07-25

## 2019-07-25 VITALS — HEART RATE: 76 BPM | SYSTOLIC BLOOD PRESSURE: 189 MMHG | DIASTOLIC BLOOD PRESSURE: 92 MMHG

## 2019-07-25 DIAGNOSIS — M48.02 CERVICAL SPINAL STENOSIS: Primary | ICD-10-CM

## 2019-07-25 PROCEDURE — 99213 OFFICE O/P EST LOW 20 MIN: CPT | Performed by: NEUROLOGICAL SURGERY

## 2022-01-05 NOTE — POST-PROCEDURE NOTE
LP at L12  10 cc of 300M Isovue  Complications: none  EBL: 0 cc  Specimens: none     SSKI Counseling:  I discussed with the patient the risks of SSKI including but not limited to thyroid abnormalities, metallic taste, GI upset, fever, headache, acne, arthralgias, paraesthesias, lymphadenopathy, easy bleeding, arrhythmias, and allergic reaction.

## 2023-06-26 NOTE — PROGRESS NOTES
Subjective   Patient ID: Ric Roy is a 64 y.o. male is here today for follow-up with a new Cervical XR. He is almost 1 year out from anterior cervical discectomy C4 5, C5 6 and C6 7 with fusion and instrumentation done on 07/27/2018. He is feeling good with little to no pain.    History of Present Illness    This patient returns today.  He is doing well.  He has almost no pain at all.  His incision has healed well.    The following portions of the patient's history were reviewed and updated as appropriate: allergies, current medications, past family history, past medical history, past social history, past surgical history and problem list.    Review of Systems   Respiratory: Negative for chest tightness and shortness of breath.    Cardiovascular: Negative for chest pain.   Musculoskeletal: Positive for neck pain ( Little to no pain).   All other systems reviewed and are negative.      Objective   Physical Exam   Constitutional: He is oriented to person, place, and time. He appears well-developed and well-nourished.   Neurological: He is oriented to person, place, and time.     Neurologic Exam     Mental Status   Oriented to person, place, and time.       Assessment/Plan   Independent Review of Radiographic Studies:      I reviewed his x-rays which were done on the 22nd.  This shows no change from the x-rays done in January.  I believe he is developing a solid fusion at all 3 levels.    Medical Decision Making:      I told the patient that he can pretty much do anything that he wants.  I told him to avoid heavy lifting.  Otherwise I told him to just be careful of his neck.  We will see him back as needed.    Ric was seen today for follow-up.    Diagnoses and all orders for this visit:    Cervical spinal stenosis      Return if symptoms worsen or fail to improve.                  Billing Type: Third-Party Bill

## 2023-07-20 ENCOUNTER — HOSPITAL ENCOUNTER (OUTPATIENT)
Facility: HOSPITAL | Age: 68
Setting detail: HOSPITAL OUTPATIENT SURGERY
Discharge: HOME | End: 2023-07-20
Attending: STUDENT IN AN ORGANIZED HEALTH CARE EDUCATION/TRAINING PROGRAM | Admitting: STUDENT IN AN ORGANIZED HEALTH CARE EDUCATION/TRAINING PROGRAM
Payer: COMMERCIAL

## 2023-07-20 VITALS
OXYGEN SATURATION: 95 % | RESPIRATION RATE: 16 BRPM | HEIGHT: 72 IN | WEIGHT: 273.37 LBS | DIASTOLIC BLOOD PRESSURE: 80 MMHG | BODY MASS INDEX: 37.03 KG/M2 | TEMPERATURE: 97.6 F | SYSTOLIC BLOOD PRESSURE: 173 MMHG | HEART RATE: 66 BPM

## 2023-07-20 DIAGNOSIS — Z98.890 HISTORY OF SURGERY ON ARM: Primary | ICD-10-CM

## 2023-07-20 LAB
ANION GAP SERPL CALCULATED.3IONS-SCNC: 10 MMOL/L (ref 5–15)
BUN SERPL-MCNC: 51 MG/DL (ref 8–23)
BUN/CREAT SERPL: 14.3 (ref 7–25)
CALCIUM SPEC-SCNC: 8.7 MG/DL (ref 8.6–10.5)
CHLORIDE SERPL-SCNC: 103 MMOL/L (ref 98–107)
CO2 SERPL-SCNC: 25 MMOL/L (ref 22–29)
CREAT SERPL-MCNC: 3.57 MG/DL (ref 0.76–1.27)
EGFRCR SERPLBLD CKD-EPI 2021: 17.8 ML/MIN/1.73
GLUCOSE BLDC GLUCOMTR-MCNC: 100 MG/DL (ref 70–130)
GLUCOSE BLDC GLUCOMTR-MCNC: 112 MG/DL (ref 70–130)
GLUCOSE SERPL-MCNC: 106 MG/DL (ref 65–99)
POTASSIUM SERPL-SCNC: 4.5 MMOL/L (ref 3.5–5.2)
SODIUM SERPL-SCNC: 138 MMOL/L (ref 136–145)

## 2023-07-20 PROCEDURE — 25010000002 FENTANYL CITRATE (PF) 50 MCG/ML SOLUTION: Performed by: ANESTHESIOLOGY

## 2023-07-20 PROCEDURE — 82948 REAGENT STRIP/BLOOD GLUCOSE: CPT

## 2023-07-20 PROCEDURE — 80048 BASIC METABOLIC PNL TOTAL CA: CPT | Performed by: STUDENT IN AN ORGANIZED HEALTH CARE EDUCATION/TRAINING PROGRAM

## 2023-07-20 PROCEDURE — 25010000002 CEFAZOLIN IN DEXTROSE 2000 MG/ 100 ML SOLUTION: Performed by: STUDENT IN AN ORGANIZED HEALTH CARE EDUCATION/TRAINING PROGRAM

## 2023-07-20 PROCEDURE — 25010000002 HEPARIN (PORCINE) PER 1000 UNITS: Performed by: STUDENT IN AN ORGANIZED HEALTH CARE EDUCATION/TRAINING PROGRAM

## 2023-07-20 PROCEDURE — 25010000002 MIDAZOLAM PER 1 MG: Performed by: ANESTHESIOLOGY

## 2023-07-20 DEVICE — LIGACLIP MCA MULTIPLE CLIP APPLIERS, 20 SMALL CLIPS
Type: IMPLANTABLE DEVICE | Site: ARM | Status: FUNCTIONAL
Brand: LIGACLIP

## 2023-07-20 RX ORDER — MAGNESIUM HYDROXIDE 1200 MG/15ML
LIQUID ORAL AS NEEDED
Status: DISCONTINUED | OUTPATIENT
Start: 2023-07-20 | End: 2023-07-20 | Stop reason: HOSPADM

## 2023-07-20 RX ORDER — HYDRALAZINE HYDROCHLORIDE 20 MG/ML
5 INJECTION INTRAMUSCULAR; INTRAVENOUS
Status: DISCONTINUED | OUTPATIENT
Start: 2023-07-20 | End: 2023-07-20 | Stop reason: HOSPADM

## 2023-07-20 RX ORDER — SODIUM CHLORIDE 0.9 % (FLUSH) 0.9 %
3-10 SYRINGE (ML) INJECTION AS NEEDED
Status: DISCONTINUED | OUTPATIENT
Start: 2023-07-20 | End: 2023-07-20 | Stop reason: HOSPADM

## 2023-07-20 RX ORDER — FENTANYL CITRATE 50 UG/ML
25 INJECTION, SOLUTION INTRAMUSCULAR; INTRAVENOUS ONCE AS NEEDED
Status: COMPLETED | OUTPATIENT
Start: 2023-07-20 | End: 2023-07-20

## 2023-07-20 RX ORDER — IPRATROPIUM BROMIDE AND ALBUTEROL SULFATE 2.5; .5 MG/3ML; MG/3ML
3 SOLUTION RESPIRATORY (INHALATION) ONCE AS NEEDED
Status: DISCONTINUED | OUTPATIENT
Start: 2023-07-20 | End: 2023-07-20 | Stop reason: HOSPADM

## 2023-07-20 RX ORDER — FENTANYL CITRATE 50 UG/ML
50 INJECTION, SOLUTION INTRAMUSCULAR; INTRAVENOUS
Status: DISCONTINUED | OUTPATIENT
Start: 2023-07-20 | End: 2023-07-20 | Stop reason: HOSPADM

## 2023-07-20 RX ORDER — HYDROMORPHONE HYDROCHLORIDE 1 MG/ML
0.5 INJECTION, SOLUTION INTRAMUSCULAR; INTRAVENOUS; SUBCUTANEOUS
Status: DISCONTINUED | OUTPATIENT
Start: 2023-07-20 | End: 2023-07-20 | Stop reason: HOSPADM

## 2023-07-20 RX ORDER — SODIUM CHLORIDE 0.9 % (FLUSH) 0.9 %
3 SYRINGE (ML) INJECTION EVERY 12 HOURS SCHEDULED
Status: DISCONTINUED | OUTPATIENT
Start: 2023-07-20 | End: 2023-07-20 | Stop reason: HOSPADM

## 2023-07-20 RX ORDER — HYDROCODONE BITARTRATE AND ACETAMINOPHEN 7.5; 325 MG/1; MG/1
1 TABLET ORAL ONCE AS NEEDED
Status: DISCONTINUED | OUTPATIENT
Start: 2023-07-20 | End: 2023-07-20 | Stop reason: HOSPADM

## 2023-07-20 RX ORDER — PROMETHAZINE HYDROCHLORIDE 25 MG/1
25 SUPPOSITORY RECTAL ONCE AS NEEDED
Status: DISCONTINUED | OUTPATIENT
Start: 2023-07-20 | End: 2023-07-20 | Stop reason: HOSPADM

## 2023-07-20 RX ORDER — NALOXONE HCL 0.4 MG/ML
0.2 VIAL (ML) INJECTION AS NEEDED
Status: DISCONTINUED | OUTPATIENT
Start: 2023-07-20 | End: 2023-07-20 | Stop reason: HOSPADM

## 2023-07-20 RX ORDER — DROPERIDOL 2.5 MG/ML
0.62 INJECTION, SOLUTION INTRAMUSCULAR; INTRAVENOUS
Status: DISCONTINUED | OUTPATIENT
Start: 2023-07-20 | End: 2023-07-20 | Stop reason: HOSPADM

## 2023-07-20 RX ORDER — FLUMAZENIL 0.1 MG/ML
0.2 INJECTION INTRAVENOUS AS NEEDED
Status: DISCONTINUED | OUTPATIENT
Start: 2023-07-20 | End: 2023-07-20 | Stop reason: HOSPADM

## 2023-07-20 RX ORDER — PROMETHAZINE HYDROCHLORIDE 25 MG/1
25 TABLET ORAL ONCE AS NEEDED
Status: DISCONTINUED | OUTPATIENT
Start: 2023-07-20 | End: 2023-07-20 | Stop reason: HOSPADM

## 2023-07-20 RX ORDER — ONDANSETRON 2 MG/ML
4 INJECTION INTRAMUSCULAR; INTRAVENOUS ONCE AS NEEDED
Status: DISCONTINUED | OUTPATIENT
Start: 2023-07-20 | End: 2023-07-20 | Stop reason: HOSPADM

## 2023-07-20 RX ORDER — CEFAZOLIN SODIUM 2 G/100ML
2000 INJECTION, SOLUTION INTRAVENOUS ONCE
Status: COMPLETED | OUTPATIENT
Start: 2023-07-20 | End: 2023-07-20

## 2023-07-20 RX ORDER — OXYCODONE AND ACETAMINOPHEN 7.5; 325 MG/1; MG/1
1 TABLET ORAL EVERY 4 HOURS PRN
Status: DISCONTINUED | OUTPATIENT
Start: 2023-07-20 | End: 2023-07-20 | Stop reason: HOSPADM

## 2023-07-20 RX ORDER — FAMOTIDINE 10 MG/ML
20 INJECTION, SOLUTION INTRAVENOUS ONCE
Status: COMPLETED | OUTPATIENT
Start: 2023-07-20 | End: 2023-07-20

## 2023-07-20 RX ORDER — LABETALOL HYDROCHLORIDE 5 MG/ML
5 INJECTION, SOLUTION INTRAVENOUS
Status: DISCONTINUED | OUTPATIENT
Start: 2023-07-20 | End: 2023-07-20 | Stop reason: HOSPADM

## 2023-07-20 RX ORDER — SODIUM CHLORIDE, SODIUM LACTATE, POTASSIUM CHLORIDE, CALCIUM CHLORIDE 600; 310; 30; 20 MG/100ML; MG/100ML; MG/100ML; MG/100ML
9 INJECTION, SOLUTION INTRAVENOUS CONTINUOUS
Status: DISCONTINUED | OUTPATIENT
Start: 2023-07-20 | End: 2023-07-20 | Stop reason: HOSPADM

## 2023-07-20 RX ORDER — MIDAZOLAM HYDROCHLORIDE 1 MG/ML
0.5 INJECTION INTRAMUSCULAR; INTRAVENOUS
Status: DISCONTINUED | OUTPATIENT
Start: 2023-07-20 | End: 2023-07-20 | Stop reason: HOSPADM

## 2023-07-20 RX ORDER — DIPHENHYDRAMINE HYDROCHLORIDE 50 MG/ML
12.5 INJECTION INTRAMUSCULAR; INTRAVENOUS
Status: DISCONTINUED | OUTPATIENT
Start: 2023-07-20 | End: 2023-07-20 | Stop reason: HOSPADM

## 2023-07-20 RX ORDER — EPHEDRINE SULFATE 50 MG/ML
5 INJECTION, SOLUTION INTRAVENOUS ONCE AS NEEDED
Status: DISCONTINUED | OUTPATIENT
Start: 2023-07-20 | End: 2023-07-20 | Stop reason: HOSPADM

## 2023-07-20 RX ORDER — LIDOCAINE HYDROCHLORIDE 10 MG/ML
0.5 INJECTION, SOLUTION EPIDURAL; INFILTRATION; INTRACAUDAL; PERINEURAL ONCE AS NEEDED
Status: DISCONTINUED | OUTPATIENT
Start: 2023-07-20 | End: 2023-07-20 | Stop reason: HOSPADM

## 2023-07-20 RX ORDER — OXYCODONE HYDROCHLORIDE 5 MG/1
5 TABLET ORAL EVERY 4 HOURS PRN
Qty: 10 TABLET | Refills: 0 | Status: SHIPPED | OUTPATIENT
Start: 2023-07-20

## 2023-07-20 RX ADMIN — FAMOTIDINE 20 MG: 10 INJECTION INTRAVENOUS at 06:49

## 2023-07-20 RX ADMIN — MIDAZOLAM 0.5 MG: 1 INJECTION INTRAMUSCULAR; INTRAVENOUS at 06:54

## 2023-07-20 RX ADMIN — CEFAZOLIN SODIUM 2000 MG: 2 INJECTION, SOLUTION INTRAVENOUS at 07:22

## 2023-07-20 RX ADMIN — SODIUM CHLORIDE, POTASSIUM CHLORIDE, SODIUM LACTATE AND CALCIUM CHLORIDE 9 ML/HR: 600; 310; 30; 20 INJECTION, SOLUTION INTRAVENOUS at 07:19

## 2023-07-20 RX ADMIN — FENTANYL CITRATE 25 MCG: 50 INJECTION, SOLUTION INTRAMUSCULAR; INTRAVENOUS at 06:55

## 2023-07-20 NOTE — OP NOTE
Operative Note  Location: [facility]    Pre-op Diagnosis: Chronic Kidney Disease Stage IV    Post-op Diagnosis: Same    Procedure(s):  LEFT ARM ARTERIOVENOUS FISTULA    Surgeon(s):  Gus Chong II, MD    Assistant: Kathya Salazar, Provided critical assistance in exposure, retraction, and suction that overall decrease blood loss and operative time.    Anesthesia: Monitored Anesthesia Care with Regional    Estimated Blood Loss: minimal     Staff:   Circulator: Amber Moreno RN; Rob Mayo RN  Scrub Person: Lilly Jean; Fadia Ford PCT  Assistant: Kathya Salazar, SYLVESTER    Complications: None    Specimen: None    Findings:   The vein was of good quality    The artery was of good quality    The thrill was of fair quality    10mm vein, 5mm artery, 7mm anastomosis    Indications:  The patient is an 68 y.o. male referred for evaluation for AV fistula placement.  The patient has Chronic Kidney Disease Stage IV .  After evaluation in the office and ultrasound vein mapping of the upper extremities the patient was determined to be a candidate for brachiocephalic arteriovenous fistula.  The risks, benefits, and alternatives were discussed with the patient who agreed to proceed.  This includes but is not limited to nerve injury, vascular compromise, infection, and failure to mature.    Procedure:  The patient was taken to Operating Room and identified as Ric Roy and the procedure verified as left brachial cephalic AVF. A Time Out was held and the above information confirmed.    In the operating room with the patient sedated the arm was mapped under ultrasound again to help determine the best location for incision.  A decision was made to make the incision above the antecubital fossa.  Skin and subcutaneous tissues were anesthetized.  The skin and subcutaneous was divided sharply.  The cephalic vein was identified and dissected out circumferentially for a significant segment.  It was marked.  The  bicipital aponeurosis was exposed.  The aponeurosis was divided sharply.  The brachial artery was exposed circumferentially.  Enough of the cephalic vein was exposed so that it could be ligated distally with a silk ligature and then transposed over to the brachial artery.  The vein is spatulated in order to fit the arterial anastomosis.  It was marked to reduce the risk of twisting.  The brachial artery was opened up with 11 blade scalpel and small Marie scissors.  A running anastomosis is done with a 6-0 Prolene suture.  It was flushed and de-aired prior to completion.  There was a good thrill at completion of the case.  There was a good Doppler signal at the wrist as well.  The wound was copious irrigated with normal saline and closed in 3 layers with Vicryl sutures.  Dermabond glue was used for the skin.  Patient tolerated it well and no intraoperative complications were immediately apparent.        There are no hospital problems to display for this patient.     Gus Chong II, MD     Date: 7/20/2023  Time: 09:00 EDT

## 2023-07-20 NOTE — DISCHARGE INSTRUCTIONS
What to expect after a Nerve Block    Nerve blocks administered to block pain affect many types of nerves, including those nerves that control movement, pain, and normal sensation. Following a nerve block, you may notice some bruising at the site where the block was given. You may experience sensations such as: numbness of the affected area or limb, tingling, heaviness (that is the limb feels heavy to you), weakness or inability to move the affected arm or leg, or a feeling as if your arm or leg has “fallen asleep.”     A nerve block can last from 2 to 36 hours depending on the medications used.  Usually the weakness wears off first followed by the tingling and heaviness. As the block wears off, you may begin to notice pain; however, this sequence of events may occur in any order. Typically, you will be able to move your limb before you will feel it. Once a nerve block begins to wear off, the effects are usually completely gone within 60 minutes.  If you experience continued side effects that you believe are block related for longer than 48 hours, please call your healthcare provider. Please see block-specific instructions below.    Instructions for any block involving the shoulder or arm  If you have had any kind of shoulder/arm block, you will go home with your arm in a sling. Wear the sling until the block has completely worn off. You may be required to wear it for a longer period of time per your surgeon’s recommendations.  If you have had a shoulder/arm block, it is a good idea to sleep on a recliner with pillows under your arm.    You may experience symptoms such as:  Shortness of breath  Hoarseness   Blurry vision  Unequal pupils  Drooping of your face on the same side as the block was performed    These are side effects associated with this kind of block and should go away within 12 hours.    Note: If you have severe or prolonged shortness of breath, please seek medical assistance as soon as possible.      Protection of a “blocked” arm or leg (limb)  After a nerve block, you cannot feel pain, pressure, or extremes of temperature in the affected limb. And because of this, your blocked limb is at more risk for injury. For example, it is possible to burn your limb on an extremely hot surface without feeling it.     When resting, it is important to reposition your limb periodically to avoid prolonged pressure on it. This may require the use of pillows and padding.    While sleeping, you should avoid rolling onto the affected limb or putting too much pressure on it.     If you have a cast or tight dressing, check the color of your fingers or toes of the affected limb. Call your surgeon if they look discolored (that is, dusky, dark colored).    Use caution in cold weather. Cover your limb appropriately to protect it from the cold.      Pain Management:    Your surgeon will give you a prescription for pain medication. Begin taking this before the nerve block wears off. Bear in mind that sometimes the block can wear off in the middle of the night.         -------------------------------------------------------------------------------------------------------------------------------------------------------        [facility]  Thomas Kumar, Steve Borja Scherrer, [place], [place]  [address], [address]  [phone]    Post-Operative Instructions for AV Fistula / Graft   Diet: Regular Diet    Medications: Take your regularly scheduled medications on the day of your surgery, unless your doctor has directed you otherwise. You may be sent home with a prescription for pain medication, follow the directions as prescribed.    Activity Restrictions / Driving: Avoid lifting more than 15 pounds or other activities that stress or compress the access area. No driving for the remainder of the day after surgery. You may drive when you no longer are taking narcotic pain medications. If a nerve block was done to  "numb your arm for surgery, you will be placed in an arm sling.  This numbness and inability to move the arm can last for as little as 6 hours but as many as 18.  The sling should be used during this time but can be removed when sensation and movement of your arm is normal and does not need to be used after that. Use of the arm is encouraged after the surgery.    Incision Care: Some bruising is normal. If you have drainage from the incision please notify the office. Dressing should be removed in 48 hours. After dressing is removed, it is OK to shower. Do not submerge incision until cleared by your surgeon (bath or swimming).    Bathing and Showering: You may shower after you remove your dressing.    Follow-up Appointments: You will need to return to the office for a follow-up visit within 1-3 weeks after your surgery. Please make sure you have your appointment scheduled, call [phone].    The patient (you) should:  1. Avoid wearing tight constrictive clothing over that arm.  2. Avoid wearing jewelry that is tight, such as a watch on the access arm.  3. Avoid carrying heavy objects.  4. Avoid purse straps over the fistula.  5. Avoid sleeping on the arm or keeping it bent for extended periods of time.  6. Each day, using your opposite hand, feel over the fistula for the \"thrill\" or vibration that is normally present.    Fistula Information / Care:   It is normal to have swelling in the surgical area. To help control this swelling, you should elevate your arm on a pillow.   Wiggle your fingers and clinch your fist 10 times every hour, while awake, for the first 5-7 days. Also, bend and straighten at the elbow to regain normal range of motion. These exercises are designed to promote circulation in the fingers and aid in draining away the excess fluid accumulation in the immediate area.  No blood pressures or needle sticks in the arm with your access.    Call the office for the followin. Fever greater than 101.0  2. " Uncontrolled pain. This is on a scale of 1-10 (10 being the worst pain imaginable) your pain is a level 7 or above.  3. It is important that you notify our office if you are having numbness and significant pain in the extremity in which you have just had surgery!  4. Decreased or absent thrill.  5. Nausea, diarrhea, and/or vomiting that continue for 12-24 hours.  6. Signs of an infection: redness, increased swelling, drainage, fever and/or chills.  7. Chest pain or difficulty breathing.    The fistula or graft CAN NOT be used until the MD has given written approval. Generally, a graft will be ready to use in 2 weeks, and a fistula will be ready to use in 6-8 weeks.     If you have further questions after reading this handout, the office is open from 8:30am to 5:00pm Monday through Friday. Call [phone].

## 2023-07-20 NOTE — H&P
Name: Ric Roy ADMIT: 2023   : 1955  PCP: Landon Walker MD    MRN: [id number] LOS: 0 days   AGE/SEX: 68 y.o. male  ROOM: [facility] OR/MAIN OR     No chief complaint on file.      Subjective   Patient is a 68 y.o. male presents for left arm arteriovenous fistula creation.  Patient has chronic kidney disease stage IV and was referred to us for dialysis access placement.  The patient was initially seen in April but delayed having his fistula placed due to a long vacation he had planned.  He is doing well and says there have been no significant changes since he was last seen in our clinic.  He feels well today with no recent illnesses.  He is not on any blood thinners and has no allergies.    History of Present Illness    Past Medical History:   Diagnosis Date    Arthritis     At risk for sleep apnea     7    Back pain     Chronic kidney disease     Cluster headaches     COPD (chronic obstructive pulmonary disease)     COVID     Diabetes mellitus     Foot drop, right     following back surgery nerve imvolvement    GERD (gastroesophageal reflux disease)     History of pancreatitis     Hyperlipidemia     Hypertension     Kidney failure     Microscopic colitis, unspecified microscopic colitis type     Skin abrasion     bilateral lower legs  instructed to let surgeon know about prior to surgery    Stage 3 chronic kidney disease 2018     Past Surgical History:   Procedure Laterality Date    ANTERIOR CERVICAL DISCECTOMY W/ FUSION N/A 2018    Procedure: C4 to C7 anterior cervical discectomy, fusion and instrumentation;  Surgeon: Lavon Rincon MD;  Location: [facility];  Service: Neurosurgery    BACK SURGERY      lumbar x2    COLONOSCOPY      COLONOSCOPY W/ BIOPSIES      PILONIDAL CYST DRAINAGE      TONSILLECTOMY       Family History   Problem Relation Age of Onset    Obesity Other     Malig Hyperthermia Neg Hx      Social History     Tobacco Use    Smoking  status: Every Day     Packs/day: 1.00     Years: 50.00     Pack years: 50.00     Types: Cigarettes    Smokeless tobacco: Never    Tobacco comments:     Last cigarette 7/26/18 2130   Vaping Use    Vaping Use: Never used   Substance Use Topics    Alcohol use: Not Currently    Drug use: Yes     Frequency: 2.0 times per week     Types: Marijuana     Medications Prior to Admission   Medication Sig Dispense Refill Last Dose    acetaminophen (TYLENOL) 500 MG tablet Take 2 tablets by mouth Every 6 (Six) Hours As Needed for Mild Pain.   Past Month    albuterol sulfate  (90 Base) MCG/ACT inhaler Inhale 2 puffs Every 4 (Four) Hours As Needed for Wheezing.   7/19/2023 at 0800    carvedilol (COREG) 6.25 MG tablet Take 1 tablet by mouth 2 (Two) Times a Day With Meals.   7/20/2023 at 0400    Chlorhexidine Gluconate Cloth 2 % pads Apply 1 application  topically. USE AS DIRECTED PREOP   7/20/2023 at 0400    doxazosin (CARDURA) 8 MG tablet Take 1 tablet by mouth Every Night.   7/19/2023 at 2100    fenofibrate micronized (LOFIBRA) 134 MG capsule Take 1 capsule by mouth Every Night.   7/19/2023 at 2100    furosemide (LASIX) 80 MG tablet Take 1 tablet by mouth 2 (Two) Times a Day.   7/19/2023 at 0800    gabapentin (NEURONTIN) 300 MG capsule Take 1 capsule by mouth 2 (Two) Times a Day.   7/19/2023 at 2100    hydrALAZINE (APRESOLINE) 50 MG tablet Take 1 tablet by mouth 2 (Two) Times a Day.   7/20/2023 at 0400    HYDROcodone-acetaminophen (NORCO) 7.5-325 MG per tablet Take 1 tablet by mouth Every 6 (Six) Hours As Needed for Moderate Pain.  0 Past Month    loperamide (IMODIUM) 2 MG capsule Take 1 capsule by mouth Daily With Breakfast.   7/19/2023 at 0800    magnesium oxide (MAG-OX) 400 tablet tablet Take 1 tablet by mouth Daily.   7/19/2023 at 2100    omeprazole (priLOSEC) 40 MG capsule Take 1 capsule by mouth Every Morning.   7/20/2023 at 0400    pioglitazone (ACTOS) 30 MG tablet Take 1 tablet by mouth Every Morning.   7/19/2023 at  2100    simvastatin (ZOCOR) 20 MG tablet Take 1 tablet by mouth Every Night.   7/19/2023 at 2100    vitamin D (ERGOCALCIFEROL) 1.25 MG (01582 UT) capsule capsule Take 1 capsule by mouth 1 (One) Time Per Week.   7/17/2023     Allergies:  Patient has no known allergies.    Review of Systems     Objective    Vital Signs  Temp:  [98.2 °F (36.8 °C)] 98.2 °F (36.8 °C)  Heart Rate:  [67-72] 67  Resp:  [16] 16  BP: (154)/(71-72) 154/71  SpO2:  [100 %] 100 %  on  Flow (L/min):  [2] 2;   Device (Oxygen Therapy): nasal cannula  Body mass index is 37.08 kg/m².    Physical Exam  NAD  NCAT  RRR  Respirations unlabored  L arm with palpable radial pulse.       Results Review:   I reviewed the patient's new clinical results.  Imaging Results (Last 24 Hours)       ** No results found for the last 24 hours. **            Assessment & Plan       * No active hospital problems. *      Assessment & Plan  68-year-old gentleman presenting for left arm arteriovenous fistula.  Planning for brachiocephalic or radiocephalic fistula creation.  The nature of the procedure including risks benefits and alternatives were discussed with the patient he verbalized understanding and provided consent.  Planned outpatient procedure  Cefazolin ordered for perioperative antibiotic      I discussed the patients findings and my recommendations with patient, family, and nursing staff.          Gus Chong II, MD  [facility]  [phone]  07/20/23  07:12 EDT

## 2024-08-30 ENCOUNTER — INPATIENT HOSPITAL (OUTPATIENT)
Age: 69
End: 2024-08-30

## 2024-08-30 ENCOUNTER — INPATIENT HOSPITAL (OUTPATIENT)
Dept: URBAN - METROPOLITAN AREA HOSPITAL 113 | Facility: HOSPITAL | Age: 69
End: 2024-08-30

## 2024-08-30 ENCOUNTER — APPOINTMENT (OUTPATIENT)
Dept: CT IMAGING | Facility: HOSPITAL | Age: 69
DRG: 418 | End: 2024-08-30
Payer: MEDICARE

## 2024-08-30 ENCOUNTER — APPOINTMENT (OUTPATIENT)
Dept: ULTRASOUND IMAGING | Facility: HOSPITAL | Age: 69
DRG: 418 | End: 2024-08-30
Payer: MEDICARE

## 2024-08-30 ENCOUNTER — HOSPITAL ENCOUNTER (INPATIENT)
Facility: HOSPITAL | Age: 69
LOS: 3 days | Discharge: HOME | DRG: 418 | End: 2024-09-02
Attending: EMERGENCY MEDICINE | Admitting: INTERNAL MEDICINE
Payer: MEDICARE

## 2024-08-30 DIAGNOSIS — R10.11 RIGHT UPPER QUADRANT ABDOMINAL PAIN: Primary | ICD-10-CM

## 2024-08-30 DIAGNOSIS — K52.832 LYMPHOCYTIC COLITIS: ICD-10-CM

## 2024-08-30 DIAGNOSIS — R10.11 RIGHT UPPER QUADRANT PAIN: ICD-10-CM

## 2024-08-30 DIAGNOSIS — K80.80 OTHER CHOLELITHIASIS WITHOUT OBSTRUCTION: ICD-10-CM

## 2024-08-30 DIAGNOSIS — D63.8 ANEMIA IN OTHER CHRONIC DISEASES CLASSIFIED ELSEWHERE: ICD-10-CM

## 2024-08-30 DIAGNOSIS — E83.42 HYPOMAGNESEMIA: ICD-10-CM

## 2024-08-30 DIAGNOSIS — N18.9 CHRONIC RENAL FAILURE, UNSPECIFIED CKD STAGE: ICD-10-CM

## 2024-08-30 DIAGNOSIS — J90 PLEURAL EFFUSION: ICD-10-CM

## 2024-08-30 DIAGNOSIS — K80.20 CALCULUS OF GALLBLADDER WITHOUT CHOLECYSTITIS WITHOUT OBSTRUCTION: ICD-10-CM

## 2024-08-30 DIAGNOSIS — D64.9 CHRONIC ANEMIA: ICD-10-CM

## 2024-08-30 PROBLEM — N17.9 AKI (ACUTE KIDNEY INJURY): Status: ACTIVE | Noted: 2024-08-30

## 2024-08-30 PROBLEM — K52.9 CHRONIC DIARRHEA: Status: ACTIVE | Noted: 2024-08-30

## 2024-08-30 PROBLEM — R10.9 ABDOMINAL PAIN: Status: ACTIVE | Noted: 2024-08-30

## 2024-08-30 LAB
ALBUMIN SERPL-MCNC: 3.3 G/DL (ref 3.5–5.2)
ALBUMIN/GLOB SERPL: 0.9 G/DL
ALP SERPL-CCNC: 42 U/L (ref 39–117)
ALT SERPL W P-5'-P-CCNC: 9 U/L (ref 1–41)
ANION GAP SERPL CALCULATED.3IONS-SCNC: 13.1 MMOL/L (ref 5–15)
AST SERPL-CCNC: 22 U/L (ref 1–40)
BACTERIA UR QL AUTO: NORMAL /HPF
BASOPHILS # BLD AUTO: 0.03 10*3/MM3 (ref 0–0.2)
BASOPHILS NFR BLD AUTO: 0.4 % (ref 0–1.5)
BILIRUB SERPL-MCNC: 0.3 MG/DL (ref 0–1.2)
BILIRUB UR QL STRIP: NEGATIVE
BUN SERPL-MCNC: 34 MG/DL (ref 8–23)
BUN/CREAT SERPL: 8.2 (ref 7–25)
CALCIUM SPEC-SCNC: 6.1 MG/DL (ref 8.6–10.5)
CHLORIDE SERPL-SCNC: 94 MMOL/L (ref 98–107)
CLARITY UR: CLEAR
CO2 SERPL-SCNC: 28.9 MMOL/L (ref 22–29)
COLOR UR: YELLOW
CREAT SERPL-MCNC: 4.14 MG/DL (ref 0.76–1.27)
D-LACTATE SERPL-SCNC: 2 MMOL/L (ref 0.5–2)
DEPRECATED RDW RBC AUTO: 40.7 FL (ref 37–54)
EGFRCR SERPLBLD CKD-EPI 2021: 14.8 ML/MIN/1.73
EOSINOPHIL # BLD AUTO: 0.18 10*3/MM3 (ref 0–0.4)
EOSINOPHIL NFR BLD AUTO: 2.7 % (ref 0.3–6.2)
ERYTHROCYTE [DISTWIDTH] IN BLOOD BY AUTOMATED COUNT: 12.8 % (ref 12.3–15.4)
FERRITIN SERPL-MCNC: 211 NG/ML (ref 30–400)
GLOBULIN UR ELPH-MCNC: 3.5 GM/DL
GLUCOSE SERPL-MCNC: 101 MG/DL (ref 65–99)
GLUCOSE UR STRIP-MCNC: NEGATIVE MG/DL
HCT VFR BLD AUTO: 30.8 % (ref 37.5–51)
HGB BLD-MCNC: 9.9 G/DL (ref 13–17.7)
HGB UR QL STRIP.AUTO: NEGATIVE
HYALINE CASTS UR QL AUTO: NORMAL /LPF
IMM GRANULOCYTES # BLD AUTO: 0.05 10*3/MM3 (ref 0–0.05)
IMM GRANULOCYTES NFR BLD AUTO: 0.7 % (ref 0–0.5)
INR PPP: 1.24 (ref 0.9–1.1)
IRON 24H UR-MRATE: 45 MCG/DL (ref 59–158)
IRON SATN MFR SERPL: 16 % (ref 20–50)
KETONES UR QL STRIP: NEGATIVE
LDH SERPL-CCNC: 342 U/L (ref 135–225)
LEUKOCYTE ESTERASE UR QL STRIP.AUTO: NEGATIVE
LIPASE SERPL-CCNC: 17 U/L (ref 13–60)
LYMPHOCYTES # BLD AUTO: 0.8 10*3/MM3 (ref 0.7–3.1)
LYMPHOCYTES NFR BLD AUTO: 11.8 % (ref 19.6–45.3)
MAGNESIUM SERPL-MCNC: 0.8 MG/DL (ref 1.6–2.4)
MCH RBC QN AUTO: 28 PG (ref 26.6–33)
MCHC RBC AUTO-ENTMCNC: 32.1 G/DL (ref 31.5–35.7)
MCV RBC AUTO: 87.3 FL (ref 79–97)
MONOCYTES # BLD AUTO: 0.35 10*3/MM3 (ref 0.1–0.9)
MONOCYTES NFR BLD AUTO: 5.2 % (ref 5–12)
NEUTROPHILS NFR BLD AUTO: 5.38 10*3/MM3 (ref 1.7–7)
NEUTROPHILS NFR BLD AUTO: 79.2 % (ref 42.7–76)
NITRITE UR QL STRIP: NEGATIVE
NRBC BLD AUTO-RTO: 0 /100 WBC (ref 0–0.2)
PH UR STRIP.AUTO: 7.5 [PH] (ref 5–8)
PLATELET # BLD AUTO: 142 10*3/MM3 (ref 140–450)
PMV BLD AUTO: 11.3 FL (ref 6–12)
POTASSIUM SERPL-SCNC: 3.5 MMOL/L (ref 3.5–5.2)
PROCALCITONIN SERPL-MCNC: 0.15 NG/ML (ref 0–0.25)
PROT SERPL-MCNC: 6.8 G/DL (ref 6–8.5)
PROT UR QL STRIP: ABNORMAL
PROTHROMBIN TIME: 15.8 SECONDS (ref 11.7–14.2)
RBC # BLD AUTO: 3.53 10*6/MM3 (ref 4.14–5.8)
RBC # UR STRIP: NORMAL /HPF
REF LAB TEST METHOD: NORMAL
RETICS # AUTO: 0.08 10*6/MM3 (ref 0.02–0.13)
RETICS/RBC NFR AUTO: 2.21 % (ref 0.7–1.9)
SODIUM SERPL-SCNC: 136 MMOL/L (ref 136–145)
SP GR UR STRIP: 1.01 (ref 1–1.03)
SQUAMOUS #/AREA URNS HPF: NORMAL /HPF
TIBC SERPL-MCNC: 276 MCG/DL (ref 298–536)
TRANSFERRIN SERPL-MCNC: 185 MG/DL (ref 200–360)
UROBILINOGEN UR QL STRIP: ABNORMAL
WBC # UR STRIP: NORMAL /HPF
WBC NRBC COR # BLD AUTO: 6.79 10*3/MM3 (ref 3.4–10.8)

## 2024-08-30 PROCEDURE — 99222 1ST HOSP IP/OBS MODERATE 55: CPT | Performed by: INTERNAL MEDICINE

## 2024-08-30 PROCEDURE — 83690 ASSAY OF LIPASE: CPT | Performed by: EMERGENCY MEDICINE

## 2024-08-30 PROCEDURE — 76705 ECHO EXAM OF ABDOMEN: CPT

## 2024-08-30 PROCEDURE — 85045 AUTOMATED RETICULOCYTE COUNT: CPT | Performed by: INTERNAL MEDICINE

## 2024-08-30 PROCEDURE — 87040 BLOOD CULTURE FOR BACTERIA: CPT | Performed by: EMERGENCY MEDICINE

## 2024-08-30 PROCEDURE — 25010000002 MAGNESIUM SULFATE 2 GM/50ML SOLUTION: Performed by: EMERGENCY MEDICINE

## 2024-08-30 PROCEDURE — 85610 PROTHROMBIN TIME: CPT | Performed by: EMERGENCY MEDICINE

## 2024-08-30 PROCEDURE — 99222 1ST HOSP IP/OBS MODERATE 55: CPT | Performed by: SURGERY

## 2024-08-30 PROCEDURE — 83540 ASSAY OF IRON: CPT | Performed by: INTERNAL MEDICINE

## 2024-08-30 PROCEDURE — 25810000003 SODIUM CHLORIDE 0.9 % SOLUTION: Performed by: EMERGENCY MEDICINE

## 2024-08-30 PROCEDURE — 83735 ASSAY OF MAGNESIUM: CPT | Performed by: EMERGENCY MEDICINE

## 2024-08-30 PROCEDURE — 84466 ASSAY OF TRANSFERRIN: CPT | Performed by: INTERNAL MEDICINE

## 2024-08-30 PROCEDURE — 99285 EMERGENCY DEPT VISIT HI MDM: CPT

## 2024-08-30 PROCEDURE — 36415 COLL VENOUS BLD VENIPUNCTURE: CPT | Performed by: EMERGENCY MEDICINE

## 2024-08-30 PROCEDURE — 74176 CT ABD & PELVIS W/O CONTRAST: CPT

## 2024-08-30 PROCEDURE — 82728 ASSAY OF FERRITIN: CPT | Performed by: INTERNAL MEDICINE

## 2024-08-30 PROCEDURE — 81001 URINALYSIS AUTO W/SCOPE: CPT | Performed by: EMERGENCY MEDICINE

## 2024-08-30 PROCEDURE — 85025 COMPLETE CBC W/AUTO DIFF WBC: CPT | Performed by: EMERGENCY MEDICINE

## 2024-08-30 PROCEDURE — 84145 PROCALCITONIN (PCT): CPT | Performed by: EMERGENCY MEDICINE

## 2024-08-30 PROCEDURE — 83605 ASSAY OF LACTIC ACID: CPT | Performed by: EMERGENCY MEDICINE

## 2024-08-30 PROCEDURE — 25810000003 SODIUM CHLORIDE 0.9 % SOLUTION: Performed by: INTERNAL MEDICINE

## 2024-08-30 PROCEDURE — 83615 LACTATE (LD) (LDH) ENZYME: CPT | Performed by: INTERNAL MEDICINE

## 2024-08-30 PROCEDURE — 80053 COMPREHEN METABOLIC PANEL: CPT | Performed by: EMERGENCY MEDICINE

## 2024-08-30 RX ORDER — ACETAMINOPHEN 160 MG/5ML
650 SOLUTION ORAL EVERY 4 HOURS PRN
Status: DISCONTINUED | OUTPATIENT
Start: 2024-08-30 | End: 2024-09-02 | Stop reason: HOSPADM

## 2024-08-30 RX ORDER — NALOXONE HCL 0.4 MG/ML
0.4 VIAL (ML) INJECTION
Status: DISCONTINUED | OUTPATIENT
Start: 2024-08-30 | End: 2024-09-02 | Stop reason: HOSPADM

## 2024-08-30 RX ORDER — SODIUM CHLORIDE 9 MG/ML
75 INJECTION, SOLUTION INTRAVENOUS CONTINUOUS
Status: DISCONTINUED | OUTPATIENT
Start: 2024-08-30 | End: 2024-09-02

## 2024-08-30 RX ORDER — SODIUM CHLORIDE 0.9 % (FLUSH) 0.9 %
10 SYRINGE (ML) INJECTION AS NEEDED
Status: DISCONTINUED | OUTPATIENT
Start: 2024-08-30 | End: 2024-09-02 | Stop reason: HOSPADM

## 2024-08-30 RX ORDER — CIPROFLOXACIN 500 MG/1
500 TABLET, FILM COATED ORAL 2 TIMES DAILY
COMMUNITY
Start: 2024-08-29 | End: 2024-09-02 | Stop reason: HOSPADM

## 2024-08-30 RX ORDER — SODIUM CHLORIDE 9 MG/ML
125 INJECTION, SOLUTION INTRAVENOUS CONTINUOUS
Status: DISCONTINUED | OUTPATIENT
Start: 2024-08-30 | End: 2024-08-30 | Stop reason: SDUPTHER

## 2024-08-30 RX ORDER — ERGOCALCIFEROL 1.25 MG/1
50000 CAPSULE, LIQUID FILLED ORAL WEEKLY
Status: DISCONTINUED | OUTPATIENT
Start: 2024-08-30 | End: 2024-09-02 | Stop reason: HOSPADM

## 2024-08-30 RX ORDER — NITROGLYCERIN 0.4 MG/1
0.4 TABLET SUBLINGUAL
Status: DISCONTINUED | OUTPATIENT
Start: 2024-08-30 | End: 2024-09-02 | Stop reason: HOSPADM

## 2024-08-30 RX ORDER — CARVEDILOL 12.5 MG/1
12.5 TABLET ORAL 2 TIMES DAILY WITH MEALS
Status: DISCONTINUED | OUTPATIENT
Start: 2024-08-30 | End: 2024-09-02 | Stop reason: HOSPADM

## 2024-08-30 RX ORDER — PANTOPRAZOLE SODIUM 40 MG/1
40 TABLET, DELAYED RELEASE ORAL ONCE
Status: COMPLETED | OUTPATIENT
Start: 2024-08-30 | End: 2024-08-30

## 2024-08-30 RX ORDER — LOPERAMIDE HCL 2 MG
2 CAPSULE ORAL
Status: DISCONTINUED | OUTPATIENT
Start: 2024-08-31 | End: 2024-08-31

## 2024-08-30 RX ORDER — HYDRALAZINE HYDROCHLORIDE 50 MG/1
100 TABLET, FILM COATED ORAL 2 TIMES DAILY
Status: DISCONTINUED | OUTPATIENT
Start: 2024-08-30 | End: 2024-09-02 | Stop reason: HOSPADM

## 2024-08-30 RX ORDER — ATORVASTATIN CALCIUM 20 MG/1
10 TABLET, FILM COATED ORAL DAILY
Status: DISCONTINUED | OUTPATIENT
Start: 2024-08-30 | End: 2024-09-02 | Stop reason: HOSPADM

## 2024-08-30 RX ORDER — SODIUM CHLORIDE 9 MG/ML
40 INJECTION, SOLUTION INTRAVENOUS AS NEEDED
Status: DISCONTINUED | OUTPATIENT
Start: 2024-08-30 | End: 2024-09-02 | Stop reason: HOSPADM

## 2024-08-30 RX ORDER — PANTOPRAZOLE SODIUM 40 MG/1
40 TABLET, DELAYED RELEASE ORAL
Status: DISCONTINUED | OUTPATIENT
Start: 2024-08-31 | End: 2024-09-02 | Stop reason: HOSPADM

## 2024-08-30 RX ORDER — ALBUTEROL SULFATE 0.83 MG/ML
2.5 SOLUTION RESPIRATORY (INHALATION) EVERY 4 HOURS PRN
Status: DISCONTINUED | OUTPATIENT
Start: 2024-08-30 | End: 2024-09-02 | Stop reason: HOSPADM

## 2024-08-30 RX ORDER — ACETAMINOPHEN 325 MG/1
650 TABLET ORAL EVERY 4 HOURS PRN
Status: DISCONTINUED | OUTPATIENT
Start: 2024-08-30 | End: 2024-09-02 | Stop reason: HOSPADM

## 2024-08-30 RX ORDER — GABAPENTIN 300 MG/1
300 CAPSULE ORAL 2 TIMES DAILY
Status: DISCONTINUED | OUTPATIENT
Start: 2024-08-30 | End: 2024-09-02 | Stop reason: HOSPADM

## 2024-08-30 RX ORDER — MAGNESIUM SULFATE HEPTAHYDRATE 40 MG/ML
2 INJECTION, SOLUTION INTRAVENOUS ONCE
Status: COMPLETED | OUTPATIENT
Start: 2024-08-30 | End: 2024-08-30

## 2024-08-30 RX ORDER — ACETAMINOPHEN 650 MG/1
650 SUPPOSITORY RECTAL EVERY 4 HOURS PRN
Status: DISCONTINUED | OUTPATIENT
Start: 2024-08-30 | End: 2024-09-02 | Stop reason: HOSPADM

## 2024-08-30 RX ORDER — MORPHINE SULFATE 2 MG/ML
2 INJECTION, SOLUTION INTRAMUSCULAR; INTRAVENOUS EVERY 4 HOURS PRN
Status: DISCONTINUED | OUTPATIENT
Start: 2024-08-30 | End: 2024-09-02 | Stop reason: HOSPADM

## 2024-08-30 RX ORDER — HYDROCODONE BITARTRATE AND ACETAMINOPHEN 7.5; 325 MG/1; MG/1
1 TABLET ORAL EVERY 6 HOURS PRN
Status: DISCONTINUED | OUTPATIENT
Start: 2024-08-30 | End: 2024-09-02 | Stop reason: HOSPADM

## 2024-08-30 RX ORDER — SODIUM CHLORIDE 0.9 % (FLUSH) 0.9 %
10 SYRINGE (ML) INJECTION EVERY 12 HOURS SCHEDULED
Status: DISCONTINUED | OUTPATIENT
Start: 2024-08-30 | End: 2024-09-02 | Stop reason: HOSPADM

## 2024-08-30 RX ORDER — ONDANSETRON 2 MG/ML
4 INJECTION INTRAMUSCULAR; INTRAVENOUS EVERY 6 HOURS PRN
Status: DISCONTINUED | OUTPATIENT
Start: 2024-08-30 | End: 2024-09-02 | Stop reason: HOSPADM

## 2024-08-30 RX ORDER — ONDANSETRON 8 MG/1
8 TABLET, FILM COATED ORAL EVERY 6 HOURS PRN
COMMUNITY

## 2024-08-30 RX ADMIN — MAGNESIUM SULFATE HEPTAHYDRATE 2 G: 40 INJECTION, SOLUTION INTRAVENOUS at 12:08

## 2024-08-30 RX ADMIN — ERGOCALCIFEROL 50000 UNITS: 1.25 CAPSULE ORAL at 18:34

## 2024-08-30 RX ADMIN — SODIUM CHLORIDE 125 ML/HR: 9 INJECTION, SOLUTION INTRAVENOUS at 10:24

## 2024-08-30 RX ADMIN — GABAPENTIN 300 MG: 300 CAPSULE ORAL at 20:53

## 2024-08-30 RX ADMIN — PANTOPRAZOLE SODIUM 40 MG: 40 TABLET, DELAYED RELEASE ORAL at 21:32

## 2024-08-30 RX ADMIN — HYDRALAZINE HYDROCHLORIDE 100 MG: 50 TABLET ORAL at 20:53

## 2024-08-30 RX ADMIN — SODIUM CHLORIDE 100 ML/HR: 9 INJECTION, SOLUTION INTRAVENOUS at 18:34

## 2024-08-30 RX ADMIN — MAGNESIUM OXIDE 400 MG (241.3 MG MAGNESIUM) TABLET 400 MG: TABLET at 21:32

## 2024-08-30 RX ADMIN — Medication 10 ML: at 21:00

## 2024-08-30 RX ADMIN — CARVEDILOL 12.5 MG: 12.5 TABLET, FILM COATED ORAL at 18:34

## 2024-08-30 RX ADMIN — ATORVASTATIN CALCIUM 10 MG: 20 TABLET, FILM COATED ORAL at 18:34

## 2024-08-30 NOTE — ED NOTES
Nursing report ED to floor  Ric Roy  69 y.o.  male    HPI :  HPI (Adult)  Stated Reason for Visit: RUQ abd pain  History Obtained From: patient    Chief Complaint  Chief Complaint   Patient presents with    Abdominal Pain    Diarrhea     Ric Roy is a 69 y.o. male who presents to the ED c/o patient has had abdominal pain on the right side of the abdomen and right upper quadrant this been going on for about 3 months or longer.  It is constant pain but is worse after eating.  He has had bouts of nausea but no vomiting.  He was recently in the hospital on 8/16/2024.  Please see medical history reviewed below.  Saw his nephrologist recently as well.  Saw his primary care physician yesterday who thought it was his gallbladder put him on ciprofloxacin and told him that he needed further evaluation to go to the emergency department.  He reports that he has had low-grade fevers off and on for these past 3 months.  His highest temperature was 100.4.  He does suffer from chronic diarrhea as he has chronic colitis in which she takes Imodium.  That is at baseline and that has not changed or worse.  He is a smoker.  He has a chronic cough which is at baseline.  He does have chronic shortness of breath with exertion which is at baseline.  Denies any new chest pain or any new shortness of breath any new rash denies any urinary symptoms such as burning with urination or frequent urination.  He states that his kidney function is about 16%.  He did does have a history of pancreatitis from alcohol in the past but he no longer actively drinks.  He still does smoke     Admitting doctor:   Estefani Garcia MD    Admitting diagnosis:   The primary encounter diagnosis was Right upper quadrant abdominal pain. Diagnoses of Pleural effusion: Bilateral, Chronic renal failure, unspecified CKD stage, Chronic anemia, and Hypomagnesemia were also pertinent to this visit.    Code status:   Current Code Status       Date Active Code  "Status Order ID Comments User Context       Prior            Allergies:   Patient has no known allergies.    Isolation:   No active isolations    Intake and Output  No intake or output data in the 24 hours ending 08/30/24 1524    Weight:       08/30/24  0955   Weight: 109 kg (240 lb)       Most recent vitals:   Vitals:    08/30/24 0941 08/30/24 0947 08/30/24 0955   BP:  134/74    Pulse: 84     Resp: 18     Temp: 99.3 °F (37.4 °C)     TempSrc: Tympanic     SpO2: 94%     Weight:   109 kg (240 lb)   Height:   182.9 cm (72\")       Active LDAs/IV Access:   Lines, Drains & Airways       Active LDAs       Name Placement date Placement time Site Days    Peripheral IV 08/30/24 0956 Anterior;Right Forearm 08/30/24 0956  Forearm  less than 1                    Labs (abnormal labs have a star):   Labs Reviewed   COMPREHENSIVE METABOLIC PANEL - Abnormal; Notable for the following components:       Result Value    Glucose 101 (*)     BUN 34 (*)     Creatinine 4.14 (*)     Chloride 94 (*)     Calcium 6.1 (*)     Albumin 3.3 (*)     eGFR 14.8 (*)     All other components within normal limits    Narrative:     GFR Normal >60  Chronic Kidney Disease <60  Kidney Failure <15     PROTIME-INR - Abnormal; Notable for the following components:    Protime 15.8 (*)     INR 1.24 (*)     All other components within normal limits   URINALYSIS W/ MICROSCOPIC IF INDICATED (NO CULTURE) - Abnormal; Notable for the following components:    Protein,  mg/dL (2+) (*)     All other components within normal limits   MAGNESIUM - Abnormal; Notable for the following components:    Magnesium 0.8 (*)     All other components within normal limits   CBC WITH AUTO DIFFERENTIAL - Abnormal; Notable for the following components:    RBC 3.53 (*)     Hemoglobin 9.9 (*)     Hematocrit 30.8 (*)     Neutrophil % 79.2 (*)     Lymphocyte % 11.8 (*)     Immature Grans % 0.7 (*)     All other components within normal limits   RETICULOCYTES - Abnormal; Notable for the " "following components:    Reticulocyte % 2.21 (*)     All other components within normal limits   LIPASE - Normal   LACTIC ACID, PLASMA - Normal   PROCALCITONIN - Normal    Narrative:     As a Marker for Sepsis (Non-Neonates):    1. <0.5 ng/mL represents a low risk of severe sepsis and/or septic shock.  2. >2 ng/mL represents a high risk of severe sepsis and/or septic shock.    As a Marker for Lower Respiratory Tract Infections that require antibiotic therapy:    PCT on Admission    Antibiotic Therapy       6-12 Hrs later    >0.5                Strongly Recommended  >0.25 - <0.5        Recommended   0.1 - 0.25          Discouraged              Remeasure/reassess PCT  <0.1                Strongly Discouraged     Remeasure/reassess PCT    As 28 day mortality risk marker: \"Change in Procalcitonin Result\" (>80% or <=80%) if Day 0 (or Day 1) and Day 4 values are available. Refer to [email or web address]://[email or web address]-[email or web address]    Change in PCT <=80%  A decrease of PCT levels below or equal to 80% defines a positive change in PCT test result representing a higher risk for 28-day all-cause mortality of patients diagnosed with severe sepsis for septic shock.    Change in PCT >80%  A decrease of PCT levels of more than 80% defines a negative change in PCT result representing a lower risk for 28-day all-cause mortality of patients diagnosed with severe sepsis or septic shock.      BLOOD CULTURE   BLOOD CULTURE   URINALYSIS, MICROSCOPIC ONLY   CBC AND DIFFERENTIAL    Narrative:     The following orders were created for panel order CBC & Differential.  Procedure                               Abnormality         Status                     ---------                               -----------         ------                     CBC Auto Differential[[id number]]        Abnormal            Final result                 Please view results for these tests on the individual orders.       EKG:   No orders to display       Meds given in ED: "   Medications   sodium chloride 0.9 % flush 10 mL (has no administration in time range)   sodium chloride 0.9 % infusion (125 mL/hr Intravenous New Bag 8/30/24 1024)   Magnesium Standard Dose Replacement - Follow Nurse / BPA Driven Protocol (has no administration in time range)   magnesium sulfate 2g/50 mL (PREMIX) infusion (0 g Intravenous Stopped 8/30/24 1454)       Imaging results:  CT Abdomen Pelvis Without Contrast    Result Date: 8/30/2024  1. Moderate right and small left pleural effusions. Right pleural fluid extends partially into the right major fissure. 2. Stomach is decompressed though appears thick-walled and this may be associated with gastritis and could be correlated with clinical data. Consideration could be given to further evaluation with EGD or upper GI. 3. Dense material within the gallbladder due to sludge or stones without CT evidence to suggest cholecystitis. 4. Renal atrophy with multiple bilateral renal cysts, as well small low-density lesions that are too small to characterize, and a left lower pole hyperdense lesion that is likely a hemorrhagic or proteinaceous cyst, though not definitively characterized. 5. Subcutaneous cystic lesion within the lower back fat consistent with a sebaceous cyst and is mildly increased in size compared to prior CT 07/10/2018. 6. 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   Radiation dose reduction techniques were utilized, including automated exposure control and exposure modulation based on body size.   This report was finalized on 8/30/2024 2:32 PM by Maurice Bryant M.D on Workstation: [id number]       Ambulatory status:   - standby     Social issues:   Social History     Socioeconomic History    Marital status:     Number of children: 0    Years of education: 12   Tobacco Use    Smoking status: Every Day     Current packs/day: 1.00     Average  packs/day: 1 pack/day for 50.0 years (50.0 ttl pk-yrs)     Types: Cigarettes    Smokeless tobacco: Never    Tobacco comments:     Last cigarette 7/26/18 2130   Vaping Use    Vaping status: Never Used   Substance and Sexual Activity    Alcohol use: Not Currently    Drug use: Yes     Frequency: 2.0 times per week     Types: Marijuana    Sexual activity: Defer       Peripheral Neurovascular  Peripheral Neurovascular (Adult)  Peripheral Neurovascular WDL: WDL    Neuro Cognitive  Neuro Cognitive (Adult)  Cognitive/Neuro/Behavioral WDL: WDL    Learning  Learning Assessment (Adult)  Learning Readiness and Ability: no barriers identified    Respiratory  Respiratory WDL  Respiratory WDL: WDL    Abdominal Pain       Pain Assessments  Pain (Adult)  (0-10) Pain Rating: Rest: 8    NIH Stroke Scale       Vanessa Garcia RN  08/30/24 15:24 EDT     Call Vanessa #[phone] with any questions

## 2024-08-30 NOTE — ED NOTES
Pt c/o RUQ pain x3 months, He reports he needs his gallbladder removed and has had these attacks multiples times. Pt states pain has worsened today. Pt reports increased pain while eating. Pt c/o diarrhea and nausea.

## 2024-08-30 NOTE — PROGRESS NOTES
Clinical Pharmacy Services: Medication History    Ric Roy is a 69 y.o. male presenting to [facility] for   Chief Complaint   Patient presents with    Abdominal Pain    Diarrhea       He  has a past medical history of Arthritis, At risk for sleep apnea, Back pain, Chronic kidney disease, Cluster headaches, COPD (chronic obstructive pulmonary disease), COVID (2022), Diabetes mellitus, Foot drop, right, GERD (gastroesophageal reflux disease), History of pancreatitis, Hyperlipidemia, Hypertension, Kidney failure (2023), Microscopic colitis, unspecified microscopic colitis type, Skin abrasion, and Stage 3 chronic kidney disease (07/27/2018).    Allergies as of 08/30/2024    (No Known Allergies)       Medication information was obtained from: Patient   Pharmacy and Phone Number:     Prior to Admission Medications       Prescriptions Last Dose Informant Patient Reported? Taking?    albuterol sulfate  (90 Base) MCG/ACT inhaler  Self Yes Yes    Inhale 2 puffs Every 4 (Four) Hours As Needed for Wheezing.    carvedilol (COREG) 12.5 MG tablet 8/29/2024 Self Yes Yes    Take 1 tablet by mouth 2 (Two) Times a Day With Meals.    ciprofloxacin (CIPRO) 500 MG tablet 8/30/2024 Self, Medication Bottle Yes Yes    Take 1 tablet by mouth 2 (Two) Times a Day.    fenofibrate micronized (LOFIBRA) 134 MG capsule 8/29/2024 Self Yes Yes    Take 1 capsule by mouth Every Night.    gabapentin (NEURONTIN) 300 MG capsule 8/29/2024 Self Yes Yes    Take 1 capsule by mouth 2 (Two) Times a Day.    hydrALAZINE (APRESOLINE) 100 MG tablet 8/29/2024 Self Yes Yes    Take 1 tablet by mouth 2 (Two) Times a Day.    loperamide (IMODIUM) 2 MG capsule 8/29/2024 Self Yes Yes    Take 1 capsule by mouth Daily With Breakfast.    magnesium oxide (MAG-OX) 400 tablet tablet 8/29/2024 Self Yes Yes    Take 1 tablet by mouth 2 (Two) Times a Day.    omeprazole (priLOSEC) 40 MG capsule 8/29/2024 Self Yes Yes    Take 1 capsule by mouth Every  Morning.    ondansetron (ZOFRAN) 8 MG tablet  Self Yes Yes    Take 1 tablet by mouth Every 6 (Six) Hours As Needed for Nausea or Vomiting.    simvastatin (ZOCOR) 20 MG tablet 8/29/2024 Self Yes Yes    Take 1 tablet by mouth Every Night.    Torsemide 60 MG tablet 8/29/2024 Self Yes Yes    Take 60 mg by mouth 2 (Two) Times a Day.    vitamin D (ERGOCALCIFEROL) 1.25 MG (26469 UT) capsule capsule  Self Yes Yes    Take 1 capsule by mouth 1 (One) Time Per Week.    acetaminophen (TYLENOL) 500 MG tablet   Yes No    Take 2 tablets by mouth Every 6 (Six) Hours As Needed for Mild Pain.    Chlorhexidine Gluconate Cloth 2 % pads   Yes No    Apply 1 Application topically. USE AS DIRECTED PREOP    doxazosin (CARDURA) 8 MG tablet   Yes No    Take 1 tablet by mouth Every Night.    furosemide (LASIX) 80 MG tablet   Yes No    Take 1 tablet by mouth 2 (Two) Times a Day.    HYDROcodone-acetaminophen (NORCO) 7.5-325 MG per tablet More than a month Self Yes No    Take 1 tablet by mouth Every 6 (Six) Hours As Needed for Moderate Pain.    oxyCODONE (Roxicodone) 5 MG immediate release tablet   No No    Take 1 tablet by mouth Every 4 (Four) Hours As Needed for Severe Pain.    pioglitazone (ACTOS) 30 MG tablet  Self Yes No    Take 1 tablet by mouth Every Morning.              Medication notes:     This medication list is complete to the best of my knowledge as of 8/30/2024    Please call if questions.    Candi Gerard  Medication History Technician   [phone]    8/30/2024 13:35 EDT

## 2024-08-30 NOTE — ED PROVIDER NOTES
EMERGENCY DEPARTMENT ENCOUNTER    Room Number:  31/31  Date of encounter:  8/30/2024  PCP: Landon Walker MD  Historian: Patient and spouse  Relevant information and history provided by sources other than the patient will be included below and in the ED Course.  Review of pertinent past medical records may also be included in record below and ED Course.    HPI:  Chief Complaint: Abdominal pain  A complete HPI/ROS/PMH/PSH/SH/FH are unobtainable due to: Not applicable  Context: Ric Roy is a 69 y.o. male who presents to the ED c/o patient has had abdominal pain on the right side of the abdomen and right upper quadrant this been going on for about 3 months or longer.  It is constant pain but is worse after eating.  He has had bouts of nausea but no vomiting.  He was recently in the hospital on 8/16/2024.  Please see medical history reviewed below.  Saw his nephrologist recently as well.  Saw his primary care physician yesterday who thought it was his gallbladder put him on ciprofloxacin and told him that he needed further evaluation to go to the emergency department.  He reports that he has had low-grade fevers off and on for these past 3 months.  His highest temperature was 100.4.  He does suffer from chronic diarrhea as he has chronic colitis in which she takes Imodium.  That is at baseline and that has not changed or worse.  He is a smoker.  He has a chronic cough which is at baseline.  He does have chronic shortness of breath with exertion which is at baseline.  Denies any new chest pain or any new shortness of breath any new rash denies any urinary symptoms such as burning with urination or frequent urination.  He states that his kidney function is about 16%.  He did does have a history of pancreatitis from alcohol in the past but he no longer actively drinks.  He still does smoke.        Previous Episodes: Yes see above  Current Symptoms: See above    MEDICAL HISTORY REVIEWED  I reviewed the note from  nephrology from 8/27/2024 from Dr. Matta.  This is a gentleman that has chronic renal failure he has BPH.  Does have longstanding hypertension as well he has hyperlipidemia.  I can see that he has had a history of diarrhea and when I review the current medicine list he is on Imodium.  Again this was from the visit to nephrologist on 8/27/2024.  He is on Tylenol, albuterol, Coreg, ergocalciferol, Lofibra, Neurontin, hydralazine, Norco, Imodium, mag medium, omeprazole, simvastatin, Demadex  I saw that he was recently admitted to the hospital at [facility] and discharged 8/19/2024 he had acute kidney injury superimposed on chronic kidney disease does have a history of congestive heart failure and COPD.  It appears as if he left AGAINST MEDICAL ADVICE he also was volume overloaded when nephrology saw him and they did start IV diuretics.  Nephrology pulmonary and infectious disease saw this gentleman.      PAST MEDICAL HISTORY  Active Ambulatory Problems     Diagnosis Date Noted    Cervical spinal stenosis 07/05/2018    Nausea & vomiting 07/27/2018    Hypertension 07/27/2018    Diabetes mellitus 07/27/2018    Stage 3 chronic kidney disease 07/27/2018     Resolved Ambulatory Problems     Diagnosis Date Noted    No Resolved Ambulatory Problems     Past Medical History:   Diagnosis Date    Arthritis     At risk for sleep apnea     Back pain     Chronic kidney disease     Cluster headaches     COPD (chronic obstructive pulmonary disease)     COVID 2022    Foot drop, right     GERD (gastroesophageal reflux disease)     History of pancreatitis     Hyperlipidemia     Kidney failure 2023    Microscopic colitis, unspecified microscopic colitis type     Skin abrasion          PAST SURGICAL HISTORY  Past Surgical History:   Procedure Laterality Date    ANTERIOR CERVICAL DISCECTOMY W/ FUSION N/A 07/27/2018    Procedure: C4 to C7 anterior cervical discectomy, fusion and instrumentation;  Surgeon: Lavon Rincon MD;  Location: [facility]  MAIN OR;  Service: Neurosurgery    ARTERIOVENOUS FISTULA/SHUNT SURGERY Left 7/20/2023    Procedure: LEFT ARM ARTERIOVENOUS FISTULA;  Surgeon: Gus Chong II, MD;  Location: [facility] MAIN OR;  Service: Vascular;  Laterality: Left;    BACK SURGERY      lumbar x2    COLONOSCOPY      COLONOSCOPY W/ BIOPSIES      PILONIDAL CYST DRAINAGE      TONSILLECTOMY           FAMILY HISTORY  Family History   Problem Relation Age of Onset    Obesity Other     Malig Hyperthermia Neg Hx          SOCIAL HISTORY  Social History     Socioeconomic History    Marital status:     Number of children: 0    Years of education: 12   Tobacco Use    Smoking status: Every Day     Current packs/day: 1.00     Average packs/day: 1 pack/day for 50.0 years (50.0 ttl pk-yrs)     Types: Cigarettes    Smokeless tobacco: Never    Tobacco comments:     Last cigarette 7/26/18 2130   Vaping Use    Vaping status: Never Used   Substance and Sexual Activity    Alcohol use: Not Currently    Drug use: Yes     Frequency: 2.0 times per week     Types: Marijuana    Sexual activity: Defer         ALLERGIES  Patient has no known allergies.        REVIEW OF SYSTEMS  Review of Systems     All systems reviewed and negative except for those discussed in HPI.       PHYSICAL EXAM    I have reviewed the triage vital signs and nursing notes.    ED Triage Vitals   Temp Heart Rate Resp BP SpO2   08/30/24 0941 08/30/24 0941 08/30/24 0941 08/30/24 0947 08/30/24 0941   99.3 °F (37.4 °C) 84 18 134/74 94 %      Temp src Heart Rate Source Patient Position BP Location FiO2 (%)   08/30/24 0941 08/30/24 0941 -- -- --   Tympanic Monitor          GENERAL: Elderly male.  Looks older than his stated age.  Looks chronically ill and frail.  He does not appear septic or toxic Vital signs on my initial evaluation have been reviewed  HENT: nares patent  Head/neck/ face are symmetric without gross deformity, signs of trauma, or swelling  EYES: no scleral icterus, no conjunctival  pallor.  NECK: Supple, no meningismus  CV: regular rhythm, regular rate with intact distal pulses.  RESPIRATORY: normal effort and no respiratory distress.  Very mild expiratory wheeze.  No bronchospasm no respiratory distress.  ABDOMEN: Obese.  He has tenderness in the right upper quadrant with palpation.  There is no guarding or rebound.  Normal bowel sounds  MUSCULOSKELETAL: no deformity.  Intact distal pulses to upper and lower extremities are equal strong and symmetric.  NEURO: alert and appropriate, moves all extremities, follows commands.  No focal motor or sensory changes  SKIN: warm, dry    Vital signs and nursing notes reviewed.        LAB RESULTS  Recent Results (from the past 24 hour(s))   Comprehensive Metabolic Panel    Collection Time: 08/30/24 10:20 AM    Specimen: Blood   Result Value Ref Range    Glucose 101 (H) 65 - 99 mg/dL    BUN 34 (H) 8 - 23 mg/dL    Creatinine 4.14 (H) 0.76 - 1.27 mg/dL    Sodium 136 136 - 145 mmol/L    Potassium 3.5 3.5 - 5.2 mmol/L    Chloride 94 (L) 98 - 107 mmol/L    CO2 28.9 22.0 - 29.0 mmol/L    Calcium 6.1 (L) 8.6 - 10.5 mg/dL    Total Protein 6.8 6.0 - 8.5 g/dL    Albumin 3.3 (L) 3.5 - 5.2 g/dL    ALT (SGPT) 9 1 - 41 U/L    AST (SGOT) 22 1 - 40 U/L    Alkaline Phosphatase 42 39 - 117 U/L    Total Bilirubin 0.3 0.0 - 1.2 mg/dL    Globulin 3.5 gm/dL    A/G Ratio 0.9 g/dL    BUN/Creatinine Ratio 8.2 7.0 - 25.0    Anion Gap 13.1 5.0 - 15.0 mmol/L    eGFR 14.8 (L) >60.0 mL/min/1.73   Protime-INR    Collection Time: 08/30/24 10:20 AM    Specimen: Blood   Result Value Ref Range    Protime 15.8 (H) 11.7 - 14.2 Seconds    INR 1.24 (H) 0.90 - 1.10   Lipase    Collection Time: 08/30/24 10:20 AM    Specimen: Blood   Result Value Ref Range    Lipase 17 13 - 60 U/L   Lactic Acid, Plasma    Collection Time: 08/30/24 10:20 AM    Specimen: Blood   Result Value Ref Range    Lactate 2.0 0.5 - 2.0 mmol/L   Procalcitonin    Collection Time: 08/30/24 10:20 AM    Specimen: Blood   Result  Value Ref Range    Procalcitonin 0.15 0.00 - 0.25 ng/mL   Magnesium    Collection Time: 08/30/24 10:20 AM    Specimen: Blood   Result Value Ref Range    Magnesium 0.8 (C) 1.6 - 2.4 mg/dL   CBC Auto Differential    Collection Time: 08/30/24 10:20 AM    Specimen: Blood   Result Value Ref Range    WBC 6.79 3.40 - 10.80 10*3/mm3    RBC 3.53 (L) 4.14 - 5.80 10*6/mm3    Hemoglobin 9.9 (L) 13.0 - 17.7 g/dL    Hematocrit 30.8 (L) 37.5 - 51.0 %    MCV 87.3 79.0 - 97.0 fL    MCH 28.0 26.6 - 33.0 pg    MCHC 32.1 31.5 - 35.7 g/dL    RDW 12.8 12.3 - 15.4 %    RDW-SD 40.7 37.0 - 54.0 fl    MPV 11.3 6.0 - 12.0 fL    Platelets 142 140 - 450 10*3/mm3    Neutrophil % 79.2 (H) 42.7 - 76.0 %    Lymphocyte % 11.8 (L) 19.6 - 45.3 %    Monocyte % 5.2 5.0 - 12.0 %    Eosinophil % 2.7 0.3 - 6.2 %    Basophil % 0.4 0.0 - 1.5 %    Immature Grans % 0.7 (H) 0.0 - 0.5 %    Neutrophils, Absolute 5.38 1.70 - 7.00 10*3/mm3    Lymphocytes, Absolute 0.80 0.70 - 3.10 10*3/mm3    Monocytes, Absolute 0.35 0.10 - 0.90 10*3/mm3    Eosinophils, Absolute 0.18 0.00 - 0.40 10*3/mm3    Basophils, Absolute 0.03 0.00 - 0.20 10*3/mm3    Immature Grans, Absolute 0.05 0.00 - 0.05 10*3/mm3    nRBC 0.0 0.0 - 0.2 /100 WBC   Reticulocytes    Collection Time: 08/30/24 10:20 AM    Specimen: Blood   Result Value Ref Range    Reticulocyte % 2.21 (H) 0.70 - 1.90 %    Reticulocyte Absolute 0.0771 0.0200 - 0.1300 10*6/mm3   Urinalysis With Microscopic If Indicated (No Culture) - Urine, Clean Catch    Collection Time: 08/30/24 12:10 PM    Specimen: Urine, Clean Catch   Result Value Ref Range    Color, UA Yellow Yellow, Straw    Appearance, UA Clear Clear    pH, UA 7.5 5.0 - 8.0    Specific Gravity, UA 1.009 1.005 - 1.030    Glucose, UA Negative Negative    Ketones, UA Negative Negative    Bilirubin, UA Negative Negative    Blood, UA Negative Negative    Protein,  mg/dL (2+) (A) Negative    Leuk Esterase, UA Negative Negative    Nitrite, UA Negative Negative     Urobilinogen, UA 0.2 E.U./dL 0.2 - 1.0 E.U./dL   Urinalysis, Microscopic Only - Urine, Clean Catch    Collection Time: 08/30/24 12:10 PM    Specimen: Urine, Clean Catch   Result Value Ref Range    RBC, UA 0-2 None Seen, 0-2 /HPF    WBC, UA 0-2 None Seen, 0-2 /HPF    Bacteria, UA None Seen None Seen /HPF    Squamous Epithelial Cells, UA 0-2 None Seen, 0-2 /HPF    Hyaline Casts, UA 0-2 None Seen /LPF    Methodology Manual Light Microscopy        Ordered the above labs and independently reviewed the results.        RADIOLOGY  US Gallbladder    Result Date: 8/30/2024  US GALLBLADDER-  Date of Exam: 8/30/2024 3:22 PM  Indication: Right upper quadrant pain.  Signs of sludge or stones.  Comparison: CT 8/30/2024.  Technique: Multiplanar grayscale color flow imaging of the right upper quadrant of the abdomen was performed.  FINDINGS: The liver demonstrates normal echogenicity and echotexture. No focal liver lesion is identified. The liver measures 20.5 cm in maximal measured length.  No intrahepatic biliary duct dilatation. The portal vein demonstrates normal hepatopedal flow.  Small echogenic 0.8 cm nodule along the wall of the nondependent lumen of the neck of the gallbladder, possible small polyp or adherent stone. Echogenic layering debris within the dependent lumen of the gallbladder, which could represent sludge or small stones. No gallbladder wall thickening or pericholecystic fluid. The common duct measures 0.2 cm in diameter. The ultrasound technologist reports a negative Woody's sign.  Visualized portions of the pancreatic head and body are within normal limits.  Multiple simple appearing right renal cysts are seen, the largest measuring 1.6 cm in diameter. The right kidney is otherwise unremarkable in ultrasound appearance. No solid right renal mass, shadowing stone, or hydronephrosis. The right kidney measures 11.6 cm in length.  Visualized portions of the abdominal aorta and IVC are unremarkable.  No free fluid  is seen in the right upper quadrant. Partially imaged right pleural effusion.       1. Layering sludge and/or small stones within the gallbladder and a small subcentimeter polyp or adherent stone in the neck of the gallbladder, without specific ultrasound evidence of acute cholecystitis. Correlate with laboratory values. If there is persistent clinical concern, could consider correlating with nuclear medicine hepatobiliary scan if clinically indicated. 2. Simple appearing right renal cysts.  This report was finalized on 8/30/2024 3:57 PM by Aditya Macias MD on Workstation: [id number]      CT Abdomen Pelvis Without Contrast    Result Date: 8/30/2024  CT ABDOMEN PELVIS WO CONTRAST-  HISTORY: 69 years of age, Male.  Right upper quadrant abdominal pain after eating with fevers.  Been occurring for 2 to 3 months.  Chronic renal failure  TECHNIQUE:  CT includes axial imaging from the lung bases to the trochanters without intravenous contrast and with use of oral contrast. Data reconstructed in coronal and sagittal planes. Radiation dose reduction techniques were utilized, including automated exposure control and exposure modulation based on body size.  COMPARISON: None  FINDINGS: Moderate right and small left pleural effusions. Right pleural fluid appears to partially extend into the major fissure.  Stomach appears thick-walled, though this is in part related to decompression of the stomach. Dense material fills approximately half the gallbladder lumen suggesting sludge or stones. No evidence for gallbladder inflammation. Liver, spleen, right adrenal gland, pancreas exhibit normal noncontrasted CT appearance. There is thickening of the left renal gland associated with low-density most likely due to an adrenal adenoma.  Bilateral renal atrophy and low-density lesions that are difficult to characterize without contrast that are most likely cysts. There is also increased density left anterior lateral lower pole renal  lesion measuring 1.6 cm that likely represents a hemorrhagic or proteinaceous cyst, though is indeterminate on this exam.  No bowel dilatation or evidence of bowel obstruction. Normal appendix is present. Mild median lobe hypertrophy of the prostate gland.  Atherosclerotic calcifications are present involving the abdominal aorta and iliac vasculature. Within the central lower back subcutaneous fat extending just to the left of midline there is a subcutaneous cystic lesion measuring 4.3 x 2.7 x 3.5 cm, and this is most likely a sebaceous cyst and has increased in size compared to prior CT lumbar spine 07/10/2018. There is advanced degenerative disc disease in the lumbar spine, especially at L3-L4 where there is 8 mm degenerative anterolisthesis of L3 with respect to L4. Chronic bilateral L3 pars defects are present. There is prominent facet arthritis with narrowing of the central canal at L3-L4 and L4-L5.      1. Moderate right and small left pleural effusions. Right pleural fluid extends partially into the right major fissure. 2. Stomach is decompressed though appears thick-walled and this may be associated with gastritis and could be correlated with clinical data. Consideration could be given to further evaluation with EGD or upper GI. 3. Dense material within the gallbladder due to sludge or stones without CT evidence to suggest cholecystitis. 4. Renal atrophy with multiple bilateral renal cysts, as well small low-density lesions that are too small to characterize, and a left lower pole hyperdense lesion that is likely a hemorrhagic or proteinaceous cyst, though not definitively characterized. 5. Subcutaneous cystic lesion within the lower back fat consistent with a sebaceous cyst and is mildly increased in size compared to prior CT 07/10/2018. 6. 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   Radiation dose reduction  techniques were utilized, including automated exposure control and exposure modulation based on body size.   This report was finalized on 8/30/2024 2:32 PM by Maurice Bryant M.D on Workstation: [id number]       I ordered the above noted radiological studies. Reviewed by me and discussed with radiologist.  See dictation for official radiology interpretation.      PROCEDURES    Procedures      MEDICATIONS GIVEN IN ER    Medications   sodium chloride 0.9 % flush 10 mL (has no administration in time range)   sodium chloride 0.9 % infusion (125 mL/hr Intravenous New Bag 8/30/24 1024)   Magnesium Standard Dose Replacement - Follow Nurse / BPA Driven Protocol (has no administration in time range)   magnesium sulfate 2g/50 mL (PREMIX) infusion (0 g Intravenous Stopped 8/30/24 1454)         All labs have been independently reviewed by me.  All radiology studies have been reviewed by me and I discussed with radiologist dictating the report when indicated below.  All EKG's independently viewed and interpreted by me.  Discussion below represents my analysis of pertinent findings related to patient's condition, differential diagnosis, treatment plan and final disposition.        PROGRESS, DATA ANALYSIS, CONSULTS, AND MEDICAL DECISION MAKING    Differential diagnosis includes   - hepatobiliary pathology such as cholecystitis, cholangitis, and symptomatic cholelithiasis  -PUD  -Mesenteric ischemia  - Pancreatitis  - Dyspepsia  - Small bowel or large bowel obstruction  - Appendicitis  - Diverticulitis  - UTI including pyelonephritis  - Ureteral stone  - Zoster  - Colitis, including infectious and ischemic  - Atypical ACS  Informed the patient and the spouse of the test that we will order.  Curly has chronic renal failure when do a CT scan without contrast to start with as well as lab work.  All questions answered at this time.    I can see that his creatinine on 7/2023 was 3.57.  I can see on 8/19/2024 patient's creatinine  was 3.94 calcium was 7.5 at that time.  ED Course as of 08/30/24 1612   Fri Aug 30, 2024   1201 Hemoglobin(!): 9.9  Chronic anemia no significant change. [MM]   1201 Albumin(!): 3.3 [MM]   1201 Calcium(!): 6.1 [MM]   1201 Potassium: 3.5 [MM]   1203 Creatinine(!): 4.14  Last creatinine I see was 3.94 on 8/19/2024 [MM]   1322 I reviewed the CT scan report from radiologist.  CT scan of the abdomen and pelvis shows a moderate right and a small left pleural effusion stomach is decompressed though appears to be thick-walled could be associated with gastritis.  There is the appearance of sludge or stones in the gallbladder without evidence of acute cholecystitis.  There is renal atrophy with multiple renal cysts bilateral.  There is a subcutaneous cystic lesion in the lower back fat consistent with a sebaceous cyst and a chronic bilateral L3 pars defect.  Please see complete dictated report from radiologist [MM]   1401 I did speak with Dr. Garcia who is on for A.  He request that I put a consult out to GI and general surgery.  He wants the recommendations on whether to start antibiotics for a potentiating cholangitis.  This this patient does not have a fever here.  White blood cell count is normal.  I do not see an elevation of ALT AST or total bilirubin.  I do not see any signs of retained stones report on the CT scan.  I will order a right upper quadrant ultrasound.  I will consult those physicians per his request.  He agrees to admit the patient to the hospital. [MM]   1517 I did discuss the case with Dr. Rodriguez about this patient.  Informed him of my conversation with Dr. Garcia.  He will consult.  This does not sound like cholangitis.  Also does not sound like cholecystitis.  Ultrasound is pending.  He is afebrile he has a normal white blood cell count.  He has had symptoms off and on for 3 months. [MM]   1549 I did discuss the case with Dr. Michael who is on for general surgery.  He agrees with the assessment that this  is very unlikely cholangitis.  Agrees with holding on antibiotics at this time.  Ultrasound is pending.  He will consult on the patient. [MM]   1606 I have reevaluated the patient and informed him of the results of the test.  He is very stable he is in no distress.  He does not need any medicine for pain.  All questions answered at this time. [MM]      ED Course User Index  [MM] John Paul Valero MD       AS OF 16:12 EDT VITALS:    BP - 140/77  HR - 84  TEMP - 99.3 °F (37.4 °C) (Tympanic)  02 SATS - 94%    SOCIAL DETERMINANTS OF HEALTH THAT IMPACT OR LIMIT CARE (For example..Homelessness,safe discharge, inability to obtain care, follow up, or prescriptions):      DIAGNOSIS  Final diagnoses:   Right upper quadrant abdominal pain   Pleural effusion: Bilateral   Chronic renal failure, unspecified CKD stage   Chronic anemia   Hypomagnesemia         DISPOSITION  I have reviewed the test results with my patient and explained the current treatment plan.  I answered all of the patient's questions.  The patient will be admitted to monitor bed at this time.  The patient is not hypotensive and is tolerating their current disease condition well enough for a monitored bed at this time.  The patient's current condition does not require intensive care treatment at this time.            DICTATED UTILIZING DRAGON DICTATION    Note Disclaimer: At [facility], we believe that sharing information builds trust and better relationships. You are receiving this note because you recently visited [facility]. It is possible you will see health information before a provider has talked with you about it. This kind of information can be easy to misunderstand. To help you fully understand what it means for your health, we urge you to discuss this note with your provider.       John Paul Valero MD  08/30/24 3278

## 2024-08-30 NOTE — CONSULTS
Date of Service: 8/30/2024    Consultation note    Referring physician: Dr. Valero    Consulting Physician: Matt Michael MD    Reason for consultation: Cholelithiasis    Chief Complaint   Patient presents with    Abdominal Pain    Diarrhea       Subjective: Ric Roy is a 69 y.o. male with persistent abdominal pain.  The patient reports that since June he has been having recurrent episodes of epigastric and right upper quadrant abdominal pain that happens 15 to 30 minutes after food intake.  He has been associated with nausea but not vomiting.  He reports fevers when he developed pain.  He reports chills.  He denies any heartburn or regurgitation.  He takes NSAIDs chronically denies NSAIDs use.  He has chronic diarrhea due to microscopic colitis.  He presented to the hospital for further evaluation and he underwent CT scan abdomen pelvis that show evidence of gallbladder sludge without any gallbladder wall thickening.  Stomach with mild wall thickening.  His primary care recently started him on ciprofloxacin for concern of acute cholecystitis.  He feels pretty well right now.  Denies any complaint        Past Medical History:   Diagnosis Date    Arthritis     At risk for sleep apnea     7    Back pain     Chronic kidney disease     Cluster headaches     COPD (chronic obstructive pulmonary disease)     COVID 2022    Diabetes mellitus     Foot drop, right     following back surgery nerve imvolvement    GERD (gastroesophageal reflux disease)     History of pancreatitis     Hyperlipidemia     Hypertension     Kidney failure 2023    Microscopic colitis, unspecified microscopic colitis type     Skin abrasion     bilateral lower legs  instructed to let surgeon know about prior to surgery    Stage 3 chronic kidney disease 07/27/2018       Past Surgical History:   Procedure Laterality Date    ANTERIOR CERVICAL DISCECTOMY W/ FUSION N/A 07/27/2018    Procedure: C4 to C7 anterior cervical discectomy, fusion and  instrumentation;  Surgeon: Lavon Rincon MD;  Location: [facility] MAIN OR;  Service: Neurosurgery    ARTERIOVENOUS FISTULA/SHUNT SURGERY Left 7/20/2023    Procedure: LEFT ARM ARTERIOVENOUS FISTULA;  Surgeon: Gus Chong II, MD;  Location: [facility] MAIN OR;  Service: Vascular;  Laterality: Left;    BACK SURGERY      lumbar x2    COLONOSCOPY      COLONOSCOPY W/ BIOPSIES      PILONIDAL CYST DRAINAGE      TONSILLECTOMY         No current facility-administered medications on file prior to encounter.     Current Outpatient Medications on File Prior to Encounter   Medication Sig Dispense Refill    albuterol sulfate  (90 Base) MCG/ACT inhaler Inhale 2 puffs Every 4 (Four) Hours As Needed for Wheezing.      carvedilol (COREG) 12.5 MG tablet Take 1 tablet by mouth 2 (Two) Times a Day With Meals.      ciprofloxacin (CIPRO) 500 MG tablet Take 1 tablet by mouth 2 (Two) Times a Day.      fenofibrate micronized (LOFIBRA) 134 MG capsule Take 1 capsule by mouth Every Night.      gabapentin (NEURONTIN) 300 MG capsule Take 1 capsule by mouth 2 (Two) Times a Day.      hydrALAZINE (APRESOLINE) 100 MG tablet Take 1 tablet by mouth 2 (Two) Times a Day.      loperamide (IMODIUM) 2 MG capsule Take 1 capsule by mouth Daily With Breakfast.      magnesium oxide (MAG-OX) 400 tablet tablet Take 1 tablet by mouth 2 (Two) Times a Day.      omeprazole (priLOSEC) 40 MG capsule Take 1 capsule by mouth Every Morning.      ondansetron (ZOFRAN) 8 MG tablet Take 1 tablet by mouth Every 6 (Six) Hours As Needed for Nausea or Vomiting.      simvastatin (ZOCOR) 20 MG tablet Take 1 tablet by mouth Every Night.      Torsemide 60 MG tablet Take 60 mg by mouth 2 (Two) Times a Day.      vitamin D (ERGOCALCIFEROL) 1.25 MG (69720 UT) capsule capsule Take 1 capsule by mouth 1 (One) Time Per Week.      acetaminophen (TYLENOL) 500 MG tablet Take 2 tablets by mouth Every 6 (Six) Hours As Needed for Mild Pain.      Chlorhexidine Gluconate Cloth 2 % pads Apply  "1 Application topically. USE AS DIRECTED PREOP      doxazosin (CARDURA) 8 MG tablet Take 1 tablet by mouth Every Night.      furosemide (LASIX) 80 MG tablet Take 1 tablet by mouth 2 (Two) Times a Day.      HYDROcodone-acetaminophen (NORCO) 7.5-325 MG per tablet Take 1 tablet by mouth Every 6 (Six) Hours As Needed for Moderate Pain.  0    oxyCODONE (Roxicodone) 5 MG immediate release tablet Take 1 tablet by mouth Every 4 (Four) Hours As Needed for Severe Pain. 10 tablet 0    pioglitazone (ACTOS) 30 MG tablet Take 1 tablet by mouth Every Morning.         No Known Allergies    Social History     Socioeconomic History    Marital status:     Number of children: 0    Years of education: 12   Tobacco Use    Smoking status: Every Day     Current packs/day: 1.00     Average packs/day: 1 pack/day for 50.0 years (50.0 ttl pk-yrs)     Types: Cigarettes    Smokeless tobacco: Never    Tobacco comments:     Last cigarette 7/26/18 2130   Vaping Use    Vaping status: Never Used   Substance and Sexual Activity    Alcohol use: Not Currently    Drug use: Yes     Frequency: 2.0 times per week     Types: Marijuana    Sexual activity: Defer       Family History   Problem Relation Age of Onset    Obesity Other     Malig Hyperthermia Neg Hx        REVIEW OF SYSTEMS    As per HPI    Physical Examination  /66 (BP Location: Right arm, Patient Position: Lying)   Pulse 91   Temp 98.4 °F (36.9 °C) (Oral)   Resp 18   Ht 182.9 cm (72\")   Wt 109 kg (240 lb)   SpO2 91%   BMI 32.55 kg/m²   Body mass index is 32.55 kg/m².  GENERAL: Alert, no acute distress, chronically ill-appearing  HEENT: normochephalic, atraumatic  NECK: Supple   CHEST: Breathing comfortable  CARDIAC: regular rate and rhythm    ABDOMEN: Abdomen soft, mildly tender to palpation right upper quadrant, no rebound or guarding no peritoneal signs  EXTREMITIES: no cyanosis, clubbing or edema    NEURO: alert and oriented, normal speech, cranial nerves 2-12 grossly " "intact, no focal deficits   SKIN: Moist, warm, no rashes.    Radiographic Studies:  CT scan of the abdomen pelvis was performed that show evidence of large at the gallbladder.  Questionable gastric wall thickening.  No other acute abnormalities    Ultrasound gallbladder that was performed today showed evidence of layering sludge and stones within the gallbladder and other than stones in the neck of the gallbladder.  There is no ultrasound evidence of cholecystitis.    Laboratory:  WBC   Date Value Ref Range Status   08/30/2024 6.79 3.40 - 10.80 10*3/mm3 Final     RBC   Date Value Ref Range Status   08/30/2024 3.53 (L) 4.14 - 5.80 10*6/mm3 Final     Hemoglobin   Date Value Ref Range Status   08/30/2024 9.9 (L) 13.0 - 17.7 g/dL Final     Hematocrit   Date Value Ref Range Status   08/30/2024 30.8 (L) 37.5 - 51.0 % Final     Platelets   Date Value Ref Range Status   08/30/2024 142 140 - 450 10*3/mm3 Final     No results found for: \"AMYLASE\"  Lipase   Date Value Ref Range Status   08/30/2024 17 13 - 60 U/L Final      Iron, iron saturation, transferrin and TIBC are all abnormally low  Hemoglobin 9.9  INR 1.2  Liver enzymes within normal limits  Magnesium 0.8, creatinine 4.1      Assessment:   Ric Roy is a 69 y.o. male with chronic kidney disease, iron deficiency anemia, pleural effusion, COPD who has been having right upper quadrant and epigastric abdominal pain with meals.  He does not have risk factors for peptic ulcer disease and has been on PPI without any other symptoms concerning for peptic ulcer disease.  I discussed with the patient about further step.  I think that because of his symptoms that has not been able to improve since July and the fact that he has cholelithiasis on ultrasound and CAT scan then I would recommend that he undergoes robotic assisted laparoscopic cholecystectomy with intraoperative cholangiogram.           Diagnosis Plan   1. Right upper quadrant abdominal pain        2. Pleural " effusion: Bilateral        3. Chronic renal failure, unspecified CKD stage        4. Chronic anemia        5. Hypomagnesemia        6. Calculus of gallbladder without cholecystitis without obstruction  Case Request    Case Request               Plan:        Robotic assisted laparoscopic Cholecystectomy with IOC under General anesthesia    - Surgery scheduling initiated.  - NPO after midnight  - I will obtain consent  - Cont. IVF  - DVT Propylaxis    Risks and rationale for the procedure were discussed with the patient including but not limited to bleeding, infection, converting to and open procedure, bile duct injury and the bowel function changes associated with this surgery. All questions were answered and the patient was willing to proceed with the above recommendations.All questions were   answered at this time.    Matt Michael MD  General, Minimally Invasive and Endoscopic Surgery  [facility]    [address], [address]  [place], [place], [address]  P: [phone]  F: [phone]

## 2024-08-30 NOTE — H&P
Internal medicine history and physical  INTERNAL MEDICINE   [facility]       Patient Identification:  Name: Ric Roy  Age: 69 y.o.  Sex: male  :  1955  MRN: [id number]                   Primary Care Physician: Landon Walker MD                               Date of admission:2024    Chief Complaint: Directed to go to the emergency room for no improvement of pain and discomfort in his upper abdomen going on off and on for the last 3 months.    History of Present Illness:   Patient is a 69-year-old male with complicated past medical history including history of progressive chronic kidney disease, diabetes, hypertension, dyslipidemia as well as history of microscopic colitis with chronic diarrhea was in his usual state of his health until 3 months ago when he developed episodes of off-and-on abdominal pain worse after eating a meal .  Patient was having episodes of sweats and fever in between.  They initially thought that it was because of his progressive kidney disease and after discussion with his nephrologist over the course of last 3 months it was concluded that this kidney had nothing to do with it.  Patient did have significant swelling and fluid buildup couple weeks ago for which his diuretics were adjusted.  In this background because of the worsening pain and discomfort he contacted his primary care provider and he was started on Cipro and Flagyl with instructions that if the pain and discomfort and fever and chills does not get any better then he needs to go to the emergency room for further evaluation.  Workup in the emergency room included CT scan of the abdomen and pelvis as well as blood work which showed cholelithiasis with normal LFTs but did show worsening kidney function.  Patient denies any excessive use of NSAIDs and his diarrheal pattern is not changed.  Patient is being admitted for management of symptomatic cholelithiasis with episodic cholecystitis was  not present at this time admission and for general surgery and GI evaluation.      Past Medical History:  Past Medical History:   Diagnosis Date    Arthritis     At risk for sleep apnea     7    Back pain     Chronic kidney disease     Cluster headaches     COPD (chronic obstructive pulmonary disease)     COVID 2022    Diabetes mellitus     Foot drop, right     following back surgery nerve imvolvement    GERD (gastroesophageal reflux disease)     History of pancreatitis     Hyperlipidemia     Hypertension     Kidney failure 2023    Microscopic colitis, unspecified microscopic colitis type     Skin abrasion     bilateral lower legs  instructed to let surgeon know about prior to surgery    Stage 3 chronic kidney disease 07/27/2018     Past Surgical History:  Past Surgical History:   Procedure Laterality Date    ANTERIOR CERVICAL DISCECTOMY W/ FUSION N/A 07/27/2018    Procedure: C4 to C7 anterior cervical discectomy, fusion and instrumentation;  Surgeon: Lavon Rincon MD;  Location: [facility] OR;  Service: Neurosurgery    ARTERIOVENOUS FISTULA/SHUNT SURGERY Left 7/20/2023    Procedure: LEFT ARM ARTERIOVENOUS FISTULA;  Surgeon: Gus Chong II, MD;  Location: [facility];  Service: Vascular;  Laterality: Left;    BACK SURGERY      lumbar x2    COLONOSCOPY      COLONOSCOPY W/ BIOPSIES      PILONIDAL CYST DRAINAGE      TONSILLECTOMY        Home Meds:  (Not in a hospital admission)    Current Meds:     Current Facility-Administered Medications:     Magnesium Standard Dose Replacement - Follow Nurse / BPA Driven Protocol, , Does not apply, PRN, John Paul Valero MD    Insert Peripheral IV, , , Once **AND** sodium chloride 0.9 % flush 10 mL, 10 mL, Intravenous, PRN, John Paul Valero MD    sodium chloride 0.9 % infusion, 125 mL/hr, Intravenous, Continuous, John Paul Valero MD, Last Rate: 125 mL/hr at 08/30/24 1024, 125 mL/hr at 08/30/24 1024    Current Outpatient Medications:     albuterol sulfate  (90 Base)  MCG/ACT inhaler, Inhale 2 puffs Every 4 (Four) Hours As Needed for Wheezing., Disp: , Rfl:     carvedilol (COREG) 12.5 MG tablet, Take 1 tablet by mouth 2 (Two) Times a Day With Meals., Disp: , Rfl:     ciprofloxacin (CIPRO) 500 MG tablet, Take 1 tablet by mouth 2 (Two) Times a Day., Disp: , Rfl:     fenofibrate micronized (LOFIBRA) 134 MG capsule, Take 1 capsule by mouth Every Night., Disp: , Rfl:     gabapentin (NEURONTIN) 300 MG capsule, Take 1 capsule by mouth 2 (Two) Times a Day., Disp: , Rfl:     hydrALAZINE (APRESOLINE) 100 MG tablet, Take 1 tablet by mouth 2 (Two) Times a Day., Disp: , Rfl:     loperamide (IMODIUM) 2 MG capsule, Take 1 capsule by mouth Daily With Breakfast., Disp: , Rfl:     magnesium oxide (MAG-OX) 400 tablet tablet, Take 1 tablet by mouth 2 (Two) Times a Day., Disp: , Rfl:     omeprazole (priLOSEC) 40 MG capsule, Take 1 capsule by mouth Every Morning., Disp: , Rfl:     ondansetron (ZOFRAN) 8 MG tablet, Take 1 tablet by mouth Every 6 (Six) Hours As Needed for Nausea or Vomiting., Disp: , Rfl:     simvastatin (ZOCOR) 20 MG tablet, Take 1 tablet by mouth Every Night., Disp: , Rfl:     Torsemide 60 MG tablet, Take 60 mg by mouth 2 (Two) Times a Day., Disp: , Rfl:     vitamin D (ERGOCALCIFEROL) 1.25 MG (70445 UT) capsule capsule, Take 1 capsule by mouth 1 (One) Time Per Week., Disp: , Rfl:     acetaminophen (TYLENOL) 500 MG tablet, Take 2 tablets by mouth Every 6 (Six) Hours As Needed for Mild Pain., Disp: , Rfl:     Chlorhexidine Gluconate Cloth 2 % pads, Apply 1 Application topically. USE AS DIRECTED PREOP, Disp: , Rfl:     doxazosin (CARDURA) 8 MG tablet, Take 1 tablet by mouth Every Night., Disp: , Rfl:     furosemide (LASIX) 80 MG tablet, Take 1 tablet by mouth 2 (Two) Times a Day., Disp: , Rfl:     HYDROcodone-acetaminophen (NORCO) 7.5-325 MG per tablet, Take 1 tablet by mouth Every 6 (Six) Hours As Needed for Moderate Pain., Disp: , Rfl: 0    oxyCODONE (Roxicodone) 5 MG immediate release  "tablet, Take 1 tablet by mouth Every 4 (Four) Hours As Needed for Severe Pain., Disp: 10 tablet, Rfl: 0    pioglitazone (ACTOS) 30 MG tablet, Take 1 tablet by mouth Every Morning., Disp: , Rfl:   Allergies:  No Known Allergies  Social History:   Social History     Tobacco Use    Smoking status: Every Day     Current packs/day: 1.00     Average packs/day: 1 pack/day for 50.0 years (50.0 ttl pk-yrs)     Types: Cigarettes    Smokeless tobacco: Never    Tobacco comments:     Last cigarette 7/26/18 2130   Substance Use Topics    Alcohol use: Not Currently      Family History:  Family History   Problem Relation Age of Onset    Obesity Other     Malig Hyperthermia Neg Hx           Review of Systems  See history of present illness and past medical history.       Vitals:   /77   Pulse 84   Temp 99.3 °F (37.4 °C) (Tympanic)   Resp 18   Ht 182.9 cm (72\")   Wt 109 kg (240 lb)   SpO2 94%   BMI 32.55 kg/m²   I/O: No intake or output data in the 24 hours ending 08/30/24 1621  Exam:  Patient is examined using the personal protective equipment as per guidelines from infection control for this particular patient as enacted.  Hand washing was performed before and after patient interaction.  General Appearance:    Alert, cooperative, no distress, appears stated age   Head:    Normocephalic, without obvious abnormality, atraumatic   Eyes:    PERRL, conjunctiva/corneas clear, EOM's intact, both eyes   Ears:    Normal external ear canals, both ears   Nose:   Nares normal, septum midline, mucosa normal, no drainage    or sinus tenderness   Throat:   Lips, tongue, gums normal; oral mucosa pink and moist   Neck: Supple and no adenopathy   Back:     Symmetric, no curvature, ROM normal, no CVA tenderness   Lungs:     Clear to auscultation bilaterally, respirations unlabored   Chest Wall:    No tenderness or deformity    Heart:  S1-S2 regular   Abdomen:   No guarding rigidity or rebound noted mild epigastric tenderness noted "   Extremities: Trace edema legs much improved compared to couple weeks ago   Pulses:   Pulses palpable in all extremities; symmetric all extremities   Skin:   Skin color normal, Skin is warm and dry,  no rashes or palpable lesions   Neurologic: Alert and oriented and grossly nonfocal       Data Review:      I reviewed the patient's new clinical results.  Results from last 7 days   Lab Units 08/30/24  1020   WBC 10*3/mm3 6.79   HEMOGLOBIN g/dL 9.9*   PLATELETS 10*3/mm3 142     Results from last 7 days   Lab Units 08/30/24  1020   SODIUM mmol/L 136   POTASSIUM mmol/L 3.5   CHLORIDE mmol/L 94*   CO2 mmol/L 28.9   BUN mg/dL 34*   CREATININE mg/dL 4.14*   CALCIUM mg/dL 6.1*   GLUCOSE mg/dL 101*     US Gallbladder    Result Date: 8/30/2024   1. Layering sludge and/or small stones within the gallbladder and a small subcentimeter polyp or adherent stone in the neck of the gallbladder, without specific ultrasound evidence of acute cholecystitis. Correlate with laboratory values. If there is persistent clinical concern, could consider correlating with nuclear medicine hepatobiliary scan if clinically indicated. 2. Simple appearing right renal cysts.  This report was finalized on 8/30/2024 3:57 PM by Aditya Macias MD on Workstation: [id number]      CT Abdomen Pelvis Without Contrast    Result Date: 8/30/2024  1. Moderate right and small left pleural effusions. Right pleural fluid extends partially into the right major fissure. 2. Stomach is decompressed though appears thick-walled and this may be associated with gastritis and could be correlated with clinical data. Consideration could be given to further evaluation with EGD or upper GI. 3. Dense material within the gallbladder due to sludge or stones without CT evidence to suggest cholecystitis. 4. Renal atrophy with multiple bilateral renal cysts, as well small low-density lesions that are too small to characterize, and a left lower pole hyperdense lesion that is likely a  hemorrhagic or proteinaceous cyst, though not definitively characterized. 5. Subcutaneous cystic lesion within the lower back fat consistent with a sebaceous cyst and is mildly increased in size compared to prior CT 07/10/2018. 6. 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   Radiation dose reduction techniques were utilized, including automated exposure control and exposure modulation based on body size.   This report was finalized on 8/30/2024 2:32 PM by Maurice Bryant M.D on Workstation: [id number]     Microbiology Results (last 10 days)       ** No results found for the last 240 hours. **          Telemetry Scan   Final Result      Telemetry Scan   Final Result      Telemetry Scan   Final Result          Assessment:  Active Hospital Problems    Diagnosis  POA    **Abdominal pain [R10.9]  Yes    Symptomatic cholelithiasis [K80.20]  Unknown    Chronic kidney disease [N18.9]  Unknown    EDDIE (acute kidney injury) [N17.9]  Unknown    Anemia [D64.9]  Unknown    Chronic diarrhea [K52.9]  Unknown    Hypomagnesemia [E83.42]  Unknown    Diabetes mellitus [E11.9]  Yes    Hypertension [I10]  Yes    Cervical spinal stenosis [M48.02]  Yes       Medical decision making/care plan: See admitting orders  Continue with symptomatic management including pain management and antiemetics  General Surgery and GI consultation  Hold his diuretics and provide him with cautious hydration given acute kidney injury in the setting of recent diuresis for volume overload while closely monitoring for anasarca and third spacing.  Nephrology consultation  Monitor his hemoglobin as anemia is likely due to chronic disease but given abdominal discomfort in association with food primary GI pathology needs to be considered such as peptic ulcer disease or gastroesophageal reflux disease  Continue his home regimen of chronic microscopic colitis and associated  diarrhea.  Provided with Accu-Cheks and sliding scale coverage and check hemoglobin A1c.    Estefani Garcia MD   8/30/2024  16:21 EDT    Parts of this note may be an electronic transcription/translation of spoken language to printed text using the Dragon dictation system.

## 2024-08-31 ENCOUNTER — ANESTHESIA EVENT (OUTPATIENT)
Dept: PERIOP | Facility: HOSPITAL | Age: 69
End: 2024-08-31
Payer: MEDICARE

## 2024-08-31 ENCOUNTER — ANESTHESIA (OUTPATIENT)
Dept: PERIOP | Facility: HOSPITAL | Age: 69
End: 2024-08-31
Payer: MEDICARE

## 2024-08-31 PROBLEM — K80.20 SYMPTOMATIC CHOLELITHIASIS: Status: RESOLVED | Noted: 2024-08-30 | Resolved: 2024-08-31

## 2024-08-31 LAB
ALBUMIN SERPL-MCNC: 2.7 G/DL (ref 3.5–5.2)
ALBUMIN SERPL-MCNC: 3 G/DL (ref 3.5–5.2)
ALBUMIN/GLOB SERPL: 0.9 G/DL
ALP SERPL-CCNC: 39 U/L (ref 39–117)
ALT SERPL W P-5'-P-CCNC: 6 U/L (ref 1–41)
ANION GAP SERPL CALCULATED.3IONS-SCNC: 10 MMOL/L (ref 5–15)
ANION GAP SERPL CALCULATED.3IONS-SCNC: 9.4 MMOL/L (ref 5–15)
AST SERPL-CCNC: 17 U/L (ref 1–40)
BASOPHILS # BLD AUTO: 0.04 10*3/MM3 (ref 0–0.2)
BASOPHILS NFR BLD AUTO: 0.8 % (ref 0–1.5)
BILIRUB SERPL-MCNC: 0.3 MG/DL (ref 0–1.2)
BUN SERPL-MCNC: 31 MG/DL (ref 8–23)
BUN SERPL-MCNC: 33 MG/DL (ref 8–23)
BUN/CREAT SERPL: 8.6 (ref 7–25)
BUN/CREAT SERPL: 9.7 (ref 7–25)
CALCIUM SPEC-SCNC: 5.7 MG/DL (ref 8.6–10.5)
CALCIUM SPEC-SCNC: 6 MG/DL (ref 8.6–10.5)
CHLORIDE SERPL-SCNC: 100 MMOL/L (ref 98–107)
CHLORIDE SERPL-SCNC: 97 MMOL/L (ref 98–107)
CO2 SERPL-SCNC: 28.6 MMOL/L (ref 22–29)
CO2 SERPL-SCNC: 29 MMOL/L (ref 22–29)
CREAT SERPL-MCNC: 3.39 MG/DL (ref 0.76–1.27)
CREAT SERPL-MCNC: 3.62 MG/DL (ref 0.76–1.27)
DEPRECATED RDW RBC AUTO: 40.5 FL (ref 37–54)
EGFRCR SERPLBLD CKD-EPI 2021: 17.4 ML/MIN/1.73
EGFRCR SERPLBLD CKD-EPI 2021: 18.8 ML/MIN/1.73
EOSINOPHIL # BLD AUTO: 0.25 10*3/MM3 (ref 0–0.4)
EOSINOPHIL NFR BLD AUTO: 4.8 % (ref 0.3–6.2)
ERYTHROCYTE [DISTWIDTH] IN BLOOD BY AUTOMATED COUNT: 12.7 % (ref 12.3–15.4)
GLOBULIN UR ELPH-MCNC: 3 GM/DL
GLUCOSE BLDC GLUCOMTR-MCNC: 112 MG/DL (ref 70–130)
GLUCOSE BLDC GLUCOMTR-MCNC: 154 MG/DL (ref 70–130)
GLUCOSE BLDC GLUCOMTR-MCNC: 180 MG/DL (ref 70–130)
GLUCOSE BLDC GLUCOMTR-MCNC: 215 MG/DL (ref 70–130)
GLUCOSE SERPL-MCNC: 100 MG/DL (ref 65–99)
GLUCOSE SERPL-MCNC: 152 MG/DL (ref 65–99)
HBA1C MFR BLD: 5.6 % (ref 4.8–5.6)
HCT VFR BLD AUTO: 27.7 % (ref 37.5–51)
HGB BLD-MCNC: 8.8 G/DL (ref 13–17.7)
IMM GRANULOCYTES # BLD AUTO: 0.03 10*3/MM3 (ref 0–0.05)
IMM GRANULOCYTES NFR BLD AUTO: 0.6 % (ref 0–0.5)
LYMPHOCYTES # BLD AUTO: 0.89 10*3/MM3 (ref 0.7–3.1)
LYMPHOCYTES NFR BLD AUTO: 17.1 % (ref 19.6–45.3)
MAGNESIUM SERPL-MCNC: 1.2 MG/DL (ref 1.6–2.4)
MCH RBC QN AUTO: 27.6 PG (ref 26.6–33)
MCHC RBC AUTO-ENTMCNC: 31.8 G/DL (ref 31.5–35.7)
MCV RBC AUTO: 86.8 FL (ref 79–97)
MONOCYTES # BLD AUTO: 0.35 10*3/MM3 (ref 0.1–0.9)
MONOCYTES NFR BLD AUTO: 6.7 % (ref 5–12)
NEUTROPHILS NFR BLD AUTO: 3.63 10*3/MM3 (ref 1.7–7)
NEUTROPHILS NFR BLD AUTO: 70 % (ref 42.7–76)
NRBC BLD AUTO-RTO: 0 /100 WBC (ref 0–0.2)
PHOSPHATE SERPL-MCNC: 2.6 MG/DL (ref 2.5–4.5)
PLATELET # BLD AUTO: 183 10*3/MM3 (ref 140–450)
PMV BLD AUTO: 10.1 FL (ref 6–12)
POTASSIUM SERPL-SCNC: 3.4 MMOL/L (ref 3.5–5.2)
POTASSIUM SERPL-SCNC: 3.9 MMOL/L (ref 3.5–5.2)
PROT SERPL-MCNC: 5.7 G/DL (ref 6–8.5)
RBC # BLD AUTO: 3.19 10*6/MM3 (ref 4.14–5.8)
SODIUM SERPL-SCNC: 135 MMOL/L (ref 136–145)
SODIUM SERPL-SCNC: 139 MMOL/L (ref 136–145)
WBC NRBC COR # BLD AUTO: 5.19 10*3/MM3 (ref 3.4–10.8)

## 2024-08-31 PROCEDURE — 25010000002 MAGNESIUM SULFATE IN D5W 1G/100ML (PREMIX) 1-5 GM/100ML-% SOLUTION: Performed by: STUDENT IN AN ORGANIZED HEALTH CARE EDUCATION/TRAINING PROGRAM

## 2024-08-31 PROCEDURE — 83036 HEMOGLOBIN GLYCOSYLATED A1C: CPT | Performed by: INTERNAL MEDICINE

## 2024-08-31 PROCEDURE — 25010000002 GLYCOPYRROLATE 0.2 MG/ML SOLUTION: Performed by: NURSE ANESTHETIST, CERTIFIED REGISTERED

## 2024-08-31 PROCEDURE — 25010000002 SUGAMMADEX 200 MG/2ML SOLUTION: Performed by: NURSE ANESTHETIST, CERTIFIED REGISTERED

## 2024-08-31 PROCEDURE — 25010000002 CALCIUM GLUCONATE-NACL 1-0.675 GM/50ML-% SOLUTION: Performed by: SURGERY

## 2024-08-31 PROCEDURE — 25810000003 SODIUM CHLORIDE 0.9 % SOLUTION: Performed by: ANESTHESIOLOGY

## 2024-08-31 PROCEDURE — 25010000002 PROPOFOL 200 MG/20ML EMULSION: Performed by: NURSE ANESTHETIST, CERTIFIED REGISTERED

## 2024-08-31 PROCEDURE — 63710000001 INSULIN REGULAR HUMAN PER 5 UNITS: Performed by: SURGERY

## 2024-08-31 PROCEDURE — 25010000002 HYDROMORPHONE 1 MG/ML SOLUTION: Performed by: NURSE ANESTHETIST, CERTIFIED REGISTERED

## 2024-08-31 PROCEDURE — S2900 ROBOTIC SURGICAL SYSTEM: HCPCS | Performed by: SURGERY

## 2024-08-31 PROCEDURE — 82948 REAGENT STRIP/BLOOD GLUCOSE: CPT

## 2024-08-31 PROCEDURE — 25810000003 SODIUM CHLORIDE PER 500 ML: Performed by: SURGERY

## 2024-08-31 PROCEDURE — 0FT44ZZ RESECTION OF GALLBLADDER, PERCUTANEOUS ENDOSCOPIC APPROACH: ICD-10-PCS | Performed by: SURGERY

## 2024-08-31 PROCEDURE — 80053 COMPREHEN METABOLIC PANEL: CPT | Performed by: INTERNAL MEDICINE

## 2024-08-31 PROCEDURE — 25010000002 CEFAZOLIN PER 500 MG: Performed by: SURGERY

## 2024-08-31 PROCEDURE — 47562 LAPAROSCOPIC CHOLECYSTECTOMY: CPT | Performed by: SURGERY

## 2024-08-31 PROCEDURE — 8E0W4CZ ROBOTIC ASSISTED PROCEDURE OF TRUNK REGION, PERCUTANEOUS ENDOSCOPIC APPROACH: ICD-10-PCS | Performed by: SURGERY

## 2024-08-31 PROCEDURE — 85025 COMPLETE CBC W/AUTO DIFF WBC: CPT | Performed by: INTERNAL MEDICINE

## 2024-08-31 PROCEDURE — 25810000003 SODIUM CHLORIDE 0.9 % SOLUTION: Performed by: SURGERY

## 2024-08-31 PROCEDURE — 83735 ASSAY OF MAGNESIUM: CPT | Performed by: SURGERY

## 2024-08-31 PROCEDURE — 25010000002 FENTANYL CITRATE (PF) 50 MCG/ML SOLUTION: Performed by: NURSE ANESTHETIST, CERTIFIED REGISTERED

## 2024-08-31 PROCEDURE — 25010000002 DEXAMETHASONE SODIUM PHOSPHATE 20 MG/5ML SOLUTION: Performed by: NURSE ANESTHETIST, CERTIFIED REGISTERED

## 2024-08-31 PROCEDURE — 84100 ASSAY OF PHOSPHORUS: CPT | Performed by: INTERNAL MEDICINE

## 2024-08-31 PROCEDURE — C1758 CATHETER, URETERAL: HCPCS | Performed by: SURGERY

## 2024-08-31 PROCEDURE — 88304 TISSUE EXAM BY PATHOLOGIST: CPT | Performed by: SURGERY

## 2024-08-31 PROCEDURE — 25010000002 INDOCYANINE GREEN 25 MG RECONSTITUTED SOLUTION: Performed by: SURGERY

## 2024-08-31 DEVICE — MEDIUM-LARGE LIGATION CLIPS 6 CLIPS/CART
Type: IMPLANTABLE DEVICE | Site: ABDOMEN | Status: FUNCTIONAL
Brand: VAS-Q-CLIP

## 2024-08-31 RX ORDER — SODIUM CHLORIDE 0.9 % (FLUSH) 0.9 %
10 SYRINGE (ML) INJECTION EVERY 12 HOURS SCHEDULED
Status: DISCONTINUED | OUTPATIENT
Start: 2024-08-31 | End: 2024-08-31 | Stop reason: HOSPADM

## 2024-08-31 RX ORDER — GLYCOPYRROLATE 0.2 MG/ML
INJECTION INTRAMUSCULAR; INTRAVENOUS AS NEEDED
Status: DISCONTINUED | OUTPATIENT
Start: 2024-08-31 | End: 2024-08-31 | Stop reason: SURG

## 2024-08-31 RX ORDER — MIDAZOLAM HYDROCHLORIDE 1 MG/ML
0.5 INJECTION INTRAMUSCULAR; INTRAVENOUS
Status: DISCONTINUED | OUTPATIENT
Start: 2024-08-31 | End: 2024-08-31 | Stop reason: HOSPADM

## 2024-08-31 RX ORDER — HYDROCODONE BITARTRATE AND ACETAMINOPHEN 5; 325 MG/1; MG/1
1 TABLET ORAL ONCE AS NEEDED
Status: DISCONTINUED | OUTPATIENT
Start: 2024-08-31 | End: 2024-08-31 | Stop reason: HOSPADM

## 2024-08-31 RX ORDER — PHENYLEPHRINE HCL IN 0.9% NACL 1 MG/10 ML
SYRINGE (ML) INTRAVENOUS AS NEEDED
Status: DISCONTINUED | OUTPATIENT
Start: 2024-08-31 | End: 2024-08-31 | Stop reason: SURG

## 2024-08-31 RX ORDER — PROMETHAZINE HYDROCHLORIDE 25 MG/1
25 TABLET ORAL ONCE AS NEEDED
Status: DISCONTINUED | OUTPATIENT
Start: 2024-08-31 | End: 2024-08-31 | Stop reason: HOSPADM

## 2024-08-31 RX ORDER — DEXTROSE MONOHYDRATE 25 G/50ML
25 INJECTION, SOLUTION INTRAVENOUS
Status: DISCONTINUED | OUTPATIENT
Start: 2024-08-31 | End: 2024-09-02 | Stop reason: HOSPADM

## 2024-08-31 RX ORDER — CALCIUM GLUCONATE 94 MG/ML
1 INJECTION, SOLUTION INTRAVENOUS ONCE
Status: DISCONTINUED | OUTPATIENT
Start: 2024-08-31 | End: 2024-08-31

## 2024-08-31 RX ORDER — LIDOCAINE HYDROCHLORIDE 10 MG/ML
0.5 INJECTION, SOLUTION INFILTRATION; PERINEURAL ONCE AS NEEDED
Status: DISCONTINUED | OUTPATIENT
Start: 2024-08-31 | End: 2024-08-31 | Stop reason: HOSPADM

## 2024-08-31 RX ORDER — FAMOTIDINE 10 MG/ML
20 INJECTION, SOLUTION INTRAVENOUS ONCE
Status: COMPLETED | OUTPATIENT
Start: 2024-08-31 | End: 2024-08-31

## 2024-08-31 RX ORDER — HYDROCODONE BITARTRATE AND ACETAMINOPHEN 7.5; 325 MG/1; MG/1
1 TABLET ORAL EVERY 4 HOURS PRN
Status: DISCONTINUED | OUTPATIENT
Start: 2024-08-31 | End: 2024-08-31 | Stop reason: HOSPADM

## 2024-08-31 RX ORDER — LIDOCAINE HYDROCHLORIDE 20 MG/ML
INJECTION, SOLUTION INFILTRATION; PERINEURAL AS NEEDED
Status: DISCONTINUED | OUTPATIENT
Start: 2024-08-31 | End: 2024-08-31 | Stop reason: SURG

## 2024-08-31 RX ORDER — INDOCYANINE GREEN AND WATER 25 MG
5 KIT INJECTION ONCE
Status: COMPLETED | OUTPATIENT
Start: 2024-08-31 | End: 2024-08-31

## 2024-08-31 RX ORDER — FENTANYL CITRATE 50 UG/ML
INJECTION, SOLUTION INTRAMUSCULAR; INTRAVENOUS AS NEEDED
Status: DISCONTINUED | OUTPATIENT
Start: 2024-08-31 | End: 2024-08-31 | Stop reason: SURG

## 2024-08-31 RX ORDER — SODIUM CHLORIDE 0.9 % (FLUSH) 0.9 %
3 SYRINGE (ML) INJECTION EVERY 12 HOURS SCHEDULED
Status: DISCONTINUED | OUTPATIENT
Start: 2024-08-31 | End: 2024-08-31 | Stop reason: HOSPADM

## 2024-08-31 RX ORDER — NICOTINE POLACRILEX 4 MG
15 LOZENGE BUCCAL
Status: DISCONTINUED | OUTPATIENT
Start: 2024-08-31 | End: 2024-09-02 | Stop reason: HOSPADM

## 2024-08-31 RX ORDER — IBUPROFEN 600 MG/1
1 TABLET ORAL
Status: DISCONTINUED | OUTPATIENT
Start: 2024-08-31 | End: 2024-09-02 | Stop reason: HOSPADM

## 2024-08-31 RX ORDER — SODIUM CHLORIDE 0.9 % (FLUSH) 0.9 %
3-10 SYRINGE (ML) INJECTION AS NEEDED
Status: DISCONTINUED | OUTPATIENT
Start: 2024-08-31 | End: 2024-08-31 | Stop reason: HOSPADM

## 2024-08-31 RX ORDER — FENTANYL CITRATE 50 UG/ML
50 INJECTION, SOLUTION INTRAMUSCULAR; INTRAVENOUS ONCE AS NEEDED
Status: DISCONTINUED | OUTPATIENT
Start: 2024-08-31 | End: 2024-08-31 | Stop reason: HOSPADM

## 2024-08-31 RX ORDER — HYDRALAZINE HYDROCHLORIDE 20 MG/ML
5 INJECTION INTRAMUSCULAR; INTRAVENOUS
Status: DISCONTINUED | OUTPATIENT
Start: 2024-08-31 | End: 2024-08-31 | Stop reason: HOSPADM

## 2024-08-31 RX ORDER — ONDANSETRON 2 MG/ML
4 INJECTION INTRAMUSCULAR; INTRAVENOUS EVERY 6 HOURS PRN
Status: DISCONTINUED | OUTPATIENT
Start: 2024-08-31 | End: 2024-09-02 | Stop reason: HOSPADM

## 2024-08-31 RX ORDER — MAGNESIUM SULFATE 1 G/100ML
2 INJECTION INTRAVENOUS
Status: CANCELLED | OUTPATIENT
Start: 2024-08-31 | End: 2024-08-31

## 2024-08-31 RX ORDER — EPHEDRINE SULFATE 50 MG/ML
INJECTION INTRAVENOUS AS NEEDED
Status: DISCONTINUED | OUTPATIENT
Start: 2024-08-31 | End: 2024-08-31 | Stop reason: SURG

## 2024-08-31 RX ORDER — DROPERIDOL 2.5 MG/ML
0.62 INJECTION, SOLUTION INTRAMUSCULAR; INTRAVENOUS
Status: DISCONTINUED | OUTPATIENT
Start: 2024-08-31 | End: 2024-08-31 | Stop reason: HOSPADM

## 2024-08-31 RX ORDER — PROPOFOL 10 MG/ML
INJECTION, EMULSION INTRAVENOUS AS NEEDED
Status: DISCONTINUED | OUTPATIENT
Start: 2024-08-31 | End: 2024-08-31 | Stop reason: SURG

## 2024-08-31 RX ORDER — LABETALOL HYDROCHLORIDE 5 MG/ML
5 INJECTION, SOLUTION INTRAVENOUS
Status: DISCONTINUED | OUTPATIENT
Start: 2024-08-31 | End: 2024-08-31 | Stop reason: HOSPADM

## 2024-08-31 RX ORDER — IPRATROPIUM BROMIDE AND ALBUTEROL SULFATE 2.5; .5 MG/3ML; MG/3ML
3 SOLUTION RESPIRATORY (INHALATION) ONCE AS NEEDED
Status: DISCONTINUED | OUTPATIENT
Start: 2024-08-31 | End: 2024-08-31 | Stop reason: HOSPADM

## 2024-08-31 RX ORDER — DIPHENHYDRAMINE HYDROCHLORIDE 50 MG/ML
12.5 INJECTION INTRAMUSCULAR; INTRAVENOUS
Status: DISCONTINUED | OUTPATIENT
Start: 2024-08-31 | End: 2024-08-31 | Stop reason: HOSPADM

## 2024-08-31 RX ORDER — SODIUM CHLORIDE 9 MG/ML
40 INJECTION, SOLUTION INTRAVENOUS AS NEEDED
Status: DISCONTINUED | OUTPATIENT
Start: 2024-08-31 | End: 2024-08-31 | Stop reason: HOSPADM

## 2024-08-31 RX ORDER — BUPIVACAINE HYDROCHLORIDE AND EPINEPHRINE 5; 5 MG/ML; UG/ML
INJECTION, SOLUTION EPIDURAL; INTRACAUDAL; PERINEURAL AS NEEDED
Status: DISCONTINUED | OUTPATIENT
Start: 2024-08-31 | End: 2024-08-31 | Stop reason: HOSPADM

## 2024-08-31 RX ORDER — ONDANSETRON 2 MG/ML
4 INJECTION INTRAMUSCULAR; INTRAVENOUS ONCE AS NEEDED
Status: DISCONTINUED | OUTPATIENT
Start: 2024-08-31 | End: 2024-08-31 | Stop reason: HOSPADM

## 2024-08-31 RX ORDER — SODIUM CHLORIDE 9 MG/ML
9 INJECTION, SOLUTION INTRAVENOUS CONTINUOUS PRN
Status: DISCONTINUED | OUTPATIENT
Start: 2024-08-31 | End: 2024-09-02 | Stop reason: HOSPADM

## 2024-08-31 RX ORDER — HYDROMORPHONE HYDROCHLORIDE 1 MG/ML
0.25 INJECTION, SOLUTION INTRAMUSCULAR; INTRAVENOUS; SUBCUTANEOUS
Status: DISCONTINUED | OUTPATIENT
Start: 2024-08-31 | End: 2024-08-31 | Stop reason: HOSPADM

## 2024-08-31 RX ORDER — FENTANYL CITRATE 50 UG/ML
25 INJECTION, SOLUTION INTRAMUSCULAR; INTRAVENOUS
Status: DISCONTINUED | OUTPATIENT
Start: 2024-08-31 | End: 2024-08-31 | Stop reason: HOSPADM

## 2024-08-31 RX ORDER — SODIUM CHLORIDE 0.9 % (FLUSH) 0.9 %
10 SYRINGE (ML) INJECTION AS NEEDED
Status: DISCONTINUED | OUTPATIENT
Start: 2024-08-31 | End: 2024-08-31 | Stop reason: HOSPADM

## 2024-08-31 RX ORDER — DEXAMETHASONE SODIUM PHOSPHATE 4 MG/ML
INJECTION, SOLUTION INTRA-ARTICULAR; INTRALESIONAL; INTRAMUSCULAR; INTRAVENOUS; SOFT TISSUE AS NEEDED
Status: DISCONTINUED | OUTPATIENT
Start: 2024-08-31 | End: 2024-08-31 | Stop reason: SURG

## 2024-08-31 RX ORDER — EPHEDRINE SULFATE 50 MG/ML
5 INJECTION, SOLUTION INTRAVENOUS ONCE AS NEEDED
Status: DISCONTINUED | OUTPATIENT
Start: 2024-08-31 | End: 2024-08-31 | Stop reason: HOSPADM

## 2024-08-31 RX ORDER — NALOXONE HCL 0.4 MG/ML
0.2 VIAL (ML) INJECTION AS NEEDED
Status: DISCONTINUED | OUTPATIENT
Start: 2024-08-31 | End: 2024-08-31 | Stop reason: HOSPADM

## 2024-08-31 RX ORDER — ROCURONIUM BROMIDE 10 MG/ML
INJECTION, SOLUTION INTRAVENOUS AS NEEDED
Status: DISCONTINUED | OUTPATIENT
Start: 2024-08-31 | End: 2024-08-31 | Stop reason: SURG

## 2024-08-31 RX ORDER — PROMETHAZINE HYDROCHLORIDE 25 MG/1
25 SUPPOSITORY RECTAL ONCE AS NEEDED
Status: DISCONTINUED | OUTPATIENT
Start: 2024-08-31 | End: 2024-08-31 | Stop reason: HOSPADM

## 2024-08-31 RX ORDER — FLUMAZENIL 0.1 MG/ML
0.2 INJECTION INTRAVENOUS AS NEEDED
Status: DISCONTINUED | OUTPATIENT
Start: 2024-08-31 | End: 2024-08-31 | Stop reason: HOSPADM

## 2024-08-31 RX ORDER — MAGNESIUM SULFATE 1 G/100ML
1 INJECTION INTRAVENOUS
Status: DISPENSED | OUTPATIENT
Start: 2024-08-31 | End: 2024-08-31

## 2024-08-31 RX ORDER — SODIUM CHLORIDE 9 MG/ML
INJECTION, SOLUTION INTRAVENOUS AS NEEDED
Status: DISCONTINUED | OUTPATIENT
Start: 2024-08-31 | End: 2024-08-31 | Stop reason: HOSPADM

## 2024-08-31 RX ORDER — CALCIUM GLUCONATE 20 MG/ML
1000 INJECTION, SOLUTION INTRAVENOUS ONCE
Status: COMPLETED | OUTPATIENT
Start: 2024-08-31 | End: 2024-08-31

## 2024-08-31 RX ADMIN — INDOCYANINE GREEN AND WATER 5 MG: KIT at 08:40

## 2024-08-31 RX ADMIN — CARVEDILOL 12.5 MG: 12.5 TABLET, FILM COATED ORAL at 17:23

## 2024-08-31 RX ADMIN — GABAPENTIN 300 MG: 300 CAPSULE ORAL at 08:09

## 2024-08-31 RX ADMIN — Medication 100 MCG: at 11:19

## 2024-08-31 RX ADMIN — LOPERAMIDE HYDROCHLORIDE 2 MG: 2 CAPSULE ORAL at 08:09

## 2024-08-31 RX ADMIN — GABAPENTIN 300 MG: 300 CAPSULE ORAL at 20:51

## 2024-08-31 RX ADMIN — SODIUM CHLORIDE 9 ML/HR: 9 INJECTION, SOLUTION INTRAVENOUS at 10:16

## 2024-08-31 RX ADMIN — Medication 10 ML: at 20:51

## 2024-08-31 RX ADMIN — GLYCOPYRROLATE 0.2 MG: 0.2 INJECTION INTRAMUSCULAR; INTRAVENOUS at 10:51

## 2024-08-31 RX ADMIN — MAGNESIUM OXIDE 400 MG (241.3 MG MAGNESIUM) TABLET 400 MG: TABLET at 08:08

## 2024-08-31 RX ADMIN — HYDROCODONE BITARTRATE AND ACETAMINOPHEN 1 TABLET: 7.5; 325 TABLET ORAL at 18:07

## 2024-08-31 RX ADMIN — ATORVASTATIN CALCIUM 10 MG: 20 TABLET, FILM COATED ORAL at 08:08

## 2024-08-31 RX ADMIN — HYDROMORPHONE HYDROCHLORIDE 0.5 MG: 1 INJECTION, SOLUTION INTRAMUSCULAR; INTRAVENOUS; SUBCUTANEOUS at 11:36

## 2024-08-31 RX ADMIN — SODIUM CHLORIDE 2000 MG: 900 INJECTION INTRAVENOUS at 10:14

## 2024-08-31 RX ADMIN — INSULIN HUMAN 3 UNITS: 100 INJECTION, SOLUTION PARENTERAL at 23:49

## 2024-08-31 RX ADMIN — FAMOTIDINE 20 MG: 10 INJECTION INTRAVENOUS at 08:47

## 2024-08-31 RX ADMIN — EPHEDRINE SULFATE 20 MG: 50 INJECTION INTRAVENOUS at 10:48

## 2024-08-31 RX ADMIN — FENTANYL CITRATE 50 MCG: 50 INJECTION, SOLUTION INTRAMUSCULAR; INTRAVENOUS at 10:31

## 2024-08-31 RX ADMIN — ROCURONIUM BROMIDE 50 MG: 10 INJECTION, SOLUTION INTRAVENOUS at 10:31

## 2024-08-31 RX ADMIN — PROPOFOL 200 MG: 10 INJECTION, EMULSION INTRAVENOUS at 10:31

## 2024-08-31 RX ADMIN — HYDRALAZINE HYDROCHLORIDE 100 MG: 50 TABLET ORAL at 20:51

## 2024-08-31 RX ADMIN — MAGNESIUM OXIDE 400 MG (241.3 MG MAGNESIUM) TABLET 400 MG: TABLET at 20:51

## 2024-08-31 RX ADMIN — Medication 10 ML: at 08:09

## 2024-08-31 RX ADMIN — EPHEDRINE SULFATE 10 MG: 50 INJECTION INTRAVENOUS at 10:46

## 2024-08-31 RX ADMIN — EPHEDRINE SULFATE 10 MG: 50 INJECTION INTRAVENOUS at 11:19

## 2024-08-31 RX ADMIN — MAGNESIUM SULFATE IN DEXTROSE 1 G: 10 INJECTION, SOLUTION INTRAVENOUS at 17:24

## 2024-08-31 RX ADMIN — INSULIN HUMAN 2 UNITS: 100 INJECTION, SOLUTION PARENTERAL at 17:23

## 2024-08-31 RX ADMIN — LIDOCAINE HYDROCHLORIDE 100 MG: 20 INJECTION, SOLUTION INFILTRATION; PERINEURAL at 10:31

## 2024-08-31 RX ADMIN — SODIUM CHLORIDE 75 ML/HR: 9 INJECTION, SOLUTION INTRAVENOUS at 01:43

## 2024-08-31 RX ADMIN — SUGAMMADEX 200 MG: 100 INJECTION, SOLUTION INTRAVENOUS at 11:36

## 2024-08-31 RX ADMIN — MAGNESIUM SULFATE IN DEXTROSE 1 G: 10 INJECTION, SOLUTION INTRAVENOUS at 18:26

## 2024-08-31 RX ADMIN — DEXAMETHASONE SODIUM PHOSPHATE 6 MG: 4 INJECTION, SOLUTION INTRAMUSCULAR; INTRAVENOUS at 10:42

## 2024-08-31 RX ADMIN — CALCIUM GLUCONATE 1000 MG: 20 INJECTION, SOLUTION INTRAVENOUS at 16:27

## 2024-08-31 NOTE — CONSULTS
[facility] Consult Note      Patient Name: Ric Roy  : 1955  MRN: [id number]  Primary Care Physician:  Landon Walker MD  Referring Physician: No ref. provider found  Date of admission: 2024    Subjective     Reason for Consult: CKD stage IV, hypomagnesemia, and hypocalcemia    HPI:   Ric Roy is a 69 y.o. male with CKD stage IV (SCr 3.7-4.0 this month, 2.7-2.9 as of February this year, followed by my partner Dr. Orestes Matta), admitted yesterday for progressive postprandial abdominal pain, intermittent fevers/chills, and nausea x 3 months, has not been able to eat anything in the past 2 days.    On admission, SCr 4.14, Mag 0.8 (replaced); UA bland; CT abdomen/pelvis and gallbladder US suggested cholelithiasis without cholecystitis; IVF overnight, today SCr down to 3.39, Ca 5.7. Planning on cholecystectomy this morning with Dr. Michael.     The patient is feeling better today, denies any chest pain or shortness of air, no orthopnea or PND, currently on 2 L/min. No nausea or vomiting.  No dysuria or gross hematuria.  No lightheadedness or dizziness.    Other PMH includes: Type II DM, COPD, hypertension, history of pancreatitis and colitis, chronic diarrhea.    Review of Systems:   14 point review of systems is otherwise negative except for mentioned above on HPI    Personal History     Past Medical History:   Diagnosis Date    Arthritis     At risk for sleep apnea     7    Back pain     Chronic kidney disease     Cluster headaches     COPD (chronic obstructive pulmonary disease)     COVID     Diabetes mellitus     Foot drop, right     following back surgery nerve imvolvement    GERD (gastroesophageal reflux disease)     History of pancreatitis     Hyperlipidemia     Hypertension     Kidney failure     Microscopic colitis, unspecified microscopic colitis type     Skin abrasion     bilateral lower legs  instructed to let surgeon know about prior to surgery     Stage 3 chronic kidney disease 07/27/2018       Past Surgical History:   Procedure Laterality Date    ANTERIOR CERVICAL DISCECTOMY W/ FUSION N/A 07/27/2018    Procedure: C4 to C7 anterior cervical discectomy, fusion and instrumentation;  Surgeon: Lavon Rincon MD;  Location: [facility];  Service: Neurosurgery    ARTERIOVENOUS FISTULA/SHUNT SURGERY Left 7/20/2023    Procedure: LEFT ARM ARTERIOVENOUS FISTULA;  Surgeon: Gus Chong II, MD;  Location: [facility] OR;  Service: Vascular;  Laterality: Left;    BACK SURGERY      lumbar x2    COLONOSCOPY      COLONOSCOPY W/ BIOPSIES      PILONIDAL CYST DRAINAGE      TONSILLECTOMY         Family History: family history includes Obesity in an other family member.    Social History:  reports that he has been smoking cigarettes. He has a 50 pack-year smoking history. He has never used smokeless tobacco. He reports that he does not currently use alcohol. He reports current drug use. Frequency: 2.00 times per week. Drug: Marijuana.    Home Medications:  Prior to Admission medications    Medication Sig Start Date End Date Taking? Authorizing Provider   albuterol sulfate  (90 Base) MCG/ACT inhaler Inhale 2 puffs Every 4 (Four) Hours As Needed for Wheezing.   Yes Theresa Mittal MD   carvedilol (COREG) 12.5 MG tablet Take 1 tablet by mouth 2 (Two) Times a Day With Meals.   Yes Theresa Mittal MD   ciprofloxacin (CIPRO) 500 MG tablet Take 1 tablet by mouth 2 (Two) Times a Day. 8/29/24 9/7/24 Yes Theresa Mittal MD   fenofibrate micronized (LOFIBRA) 134 MG capsule Take 1 capsule by mouth Every Night.   Yes Theresa Mittal MD   gabapentin (NEURONTIN) 300 MG capsule Take 1 capsule by mouth 2 (Two) Times a Day.   Yes Theresa Mittal MD   hydrALAZINE (APRESOLINE) 100 MG tablet Take 1 tablet by mouth 2 (Two) Times a Day.   Yes Theresa Mittal MD   loperamide (IMODIUM) 2 MG capsule Take 1 capsule by mouth Daily With Breakfast.   Yes  Theresa Mittal MD   magnesium oxide (MAG-OX) 400 tablet tablet Take 1 tablet by mouth 2 (Two) Times a Day.   Yes Theresa Mittal MD   omeprazole (priLOSEC) 40 MG capsule Take 1 capsule by mouth Every Morning.   Yes Theresa Mittal MD   ondansetron (ZOFRAN) 8 MG tablet Take 1 tablet by mouth Every 6 (Six) Hours As Needed for Nausea or Vomiting.   Yes Theresa Mittal MD   simvastatin (ZOCOR) 20 MG tablet Take 1 tablet by mouth Every Night.   Yes Theresa Mittal MD   Torsemide 60 MG tablet Take 60 mg by mouth 2 (Two) Times a Day.   Yes Theresa Mittal MD   vitamin D (ERGOCALCIFEROL) 1.25 MG (34002 UT) capsule capsule Take 1 capsule by mouth 1 (One) Time Per Week.   Yes Theresa Mittal MD   acetaminophen (TYLENOL) 500 MG tablet Take 2 tablets by mouth Every 6 (Six) Hours As Needed for Mild Pain.    Theresa Mittal MD   Chlorhexidine Gluconate Cloth 2 % pads Apply 1 Application topically. USE AS DIRECTED PREOP    Theresa Mittal MD   doxazosin (CARDURA) 8 MG tablet Take 1 tablet by mouth Every Night.    Theresa Mittal MD   furosemide (LASIX) 80 MG tablet Take 1 tablet by mouth 2 (Two) Times a Day.    Theresa Mittal MD   HYDROcodone-acetaminophen (NORCO) 7.5-325 MG per tablet Take 1 tablet by mouth Every 6 (Six) Hours As Needed for Moderate Pain.    Theresa Mittal MD   oxyCODONE (Roxicodone) 5 MG immediate release tablet Take 1 tablet by mouth Every 4 (Four) Hours As Needed for Severe Pain. 7/20/23   Gus Chong II, MD   pioglitazone (ACTOS) 30 MG tablet Take 1 tablet by mouth Every Morning.    Theresa Mittal MD       Allergies:  No Known Allergies    Objective     Vitals:   Temp:  [97.5 °F (36.4 °C)-98.4 °F (36.9 °C)] 98.1 °F (36.7 °C)  Heart Rate:  [62-91] 75  Resp:  [16-18] 16  BP: (117-167)/(46-78) 122/60  Flow (L/min):  [2] 2    Intake/Output Summary (Last 24 hours) at 8/31/2024 1259  Last data filed at 8/31/2024 1146  Gross per 24  hour   Intake 470 ml   Output 5 ml   Net 465 ml       Physical Exam:   Constitutional: Awake, no acute distress, chronically ill appearing.  HEENT: Sclera anicteric, no conjunctival injection  Neck: No JVD  Respiratory: Clear to auscultation bilaterally, nonlabored respiration  Cardiovascular: RRR, no murmurs, no rubs, no carotid bruit  Gastrointestinal: Positive bowel sounds, abdomen is soft, right upper quadrant tenderness and nondistended  : No palpable bladder  Musculoskeletal: trace BLE edema, no clubbing or cyanosis  Psychiatric: Appropriate affect, cooperative  Neurologic: moving all extremities, normal speech and mental status  Skin: Warm and dry       Scheduled Meds:     atorvastatin, 10 mg, Oral, Daily  [Transfer Hold] calcium gluconate, 1,000 mg, Intravenous, Once  carvedilol, 12.5 mg, Oral, BID With Meals  gabapentin, 300 mg, Oral, BID  hydrALAZINE, 100 mg, Oral, BID  [Transfer Hold] insulin regular, 2-7 Units, Subcutaneous, Q6H  loperamide, 2 mg, Oral, Daily With Breakfast  magnesium oxide, 400 mg, Oral, BID  pantoprazole, 40 mg, Oral, Q AM  sodium chloride, 10 mL, Intravenous, Q12H  vitamin D, 50,000 Units, Oral, Weekly      IV Meds:   sodium chloride, 75 mL/hr, Last Rate: 75 mL/hr (08/31/24 0143)  sodium chloride, 9 mL/hr, Last Rate: 9 mL/hr (08/31/24 1025)        Results Reviewed:   I have personally reviewed the results from the time of this admission to 8/31/2024 12:59 EDT     Lab Results   Component Value Date    GLUCOSE 100 (H) 08/31/2024    CALCIUM 5.7 (C) 08/31/2024     08/31/2024    K 3.4 (L) 08/31/2024    CO2 29.0 08/31/2024     08/31/2024    BUN 33 (H) 08/31/2024    CREATININE 3.39 (H) 08/31/2024    EGFRIFAFRI 30 (L) 10/23/2020    EGFRIFNONA 68 07/30/2018    BCR 9.7 08/31/2024    ANIONGAP 10.0 08/31/2024      Lab Results   Component Value Date    MG 0.8 (C) 08/30/2024    PHOS 1.8 (L) 07/30/2018    ALBUMIN 2.7 (L) 08/31/2024     US Gallbladder    Result Date: 8/30/2024  US  GALLBLADDER-  Date of Exam: 8/30/2024 3:22 PM  Indication: Right upper quadrant pain.  Signs of sludge or stones.  Comparison: CT 8/30/2024.  Technique: Multiplanar grayscale color flow imaging of the right upper quadrant of the abdomen was performed.  FINDINGS: The liver demonstrates normal echogenicity and echotexture. No focal liver lesion is identified. The liver measures 20.5 cm in maximal measured length.  No intrahepatic biliary duct dilatation. The portal vein demonstrates normal hepatopedal flow.  Small echogenic 0.8 cm nodule along the wall of the nondependent lumen of the neck of the gallbladder, possible small polyp or adherent stone. Echogenic layering debris within the dependent lumen of the gallbladder, which could represent sludge or small stones. No gallbladder wall thickening or pericholecystic fluid. The common duct measures 0.2 cm in diameter. The ultrasound technologist reports a negative Woody's sign.  Visualized portions of the pancreatic head and body are within normal limits.  Multiple simple appearing right renal cysts are seen, the largest measuring 1.6 cm in diameter. The right kidney is otherwise unremarkable in ultrasound appearance. No solid right renal mass, shadowing stone, or hydronephrosis. The right kidney measures 11.6 cm in length.  Visualized portions of the abdominal aorta and IVC are unremarkable.  No free fluid is seen in the right upper quadrant. Partially imaged right pleural effusion.      Impression:  1. Layering sludge and/or small stones within the gallbladder and a small subcentimeter polyp or adherent stone in the neck of the gallbladder, without specific ultrasound evidence of acute cholecystitis. Correlate with laboratory values. If there is persistent clinical concern, could consider correlating with nuclear medicine hepatobiliary scan if clinically indicated. 2. Simple appearing right renal cysts.  This report was finalized on 8/30/2024 3:57 PM by Aditya Macias  MD on Workstation: [id number]        Assessment / Plan       Abdominal pain    Cervical spinal stenosis    Hypertension    Diabetes mellitus    Chronic kidney disease    EDDIE (acute kidney injury)    Anemia    Chronic diarrhea    Hypomagnesemia    Calculus of gallbladder without cholecystitis without obstruction      ASSESSMENT:  CKD stage IV, most recent SCr 3.7-4.0, 2.7-2.9 as of February this year. Since admission, SCr 4.14-> 3.39 after IVF, secondary to prerenal from decreased p.o. intake, nausea, and chronic diarrhea with longstanding hypertensive and diabetic nephrosclerosis.  Mild hypervolemia on exam and imaging; UA bland; electrolytes within acceptable range except low potassium and low sodium  Hypocalcemia, corrected calcium by albumin is 6.74, PTH and vitamin D both with thin normal range back in May this year. 1 g calcium gluconate was given  Hypomagnesemia, will be repleted  Hypokalemia, Secondary to hypomagnesemia, potassium 3.4  Cholelithiasis, confirmed on CT and US, had laparoscopic cholecystectomy today hypertension with CKD, BP acceptable today on current regimen  Type II DM with CKD, A1C at 5.6 on 8/31  Anemia with CKD, Hgb 8.8 today, probably due to dilution of IVF; labs on 8/30/2024 suggested iron deficiency anemia, would not give iron given having current inflammation    PLAN:  Replete calcium and magnesium and potassium  I agree with the present treatment  Continue the same treatment  Surveillance labs    I reviewed the chart and other providers notes and I reviewed labs.  I discussed the case with the patient and nurse.    Thank you for involving us in the care of Ric Roy.  Please feel free to call with any questions.    Donal Castro MD  08/31/24  12:59 EDT    [facility]  [phone]      Please note that portions of this note were completed with a voice recognition program.

## 2024-08-31 NOTE — ANESTHESIA POSTPROCEDURE EVALUATION
"Patient: Ric Roy    Procedure Summary       Date: 08/31/24 Room / Location: [facility] OR [address] [place] [address] [facility] OR    Anesthesia Start: 1025 Anesthesia Stop: 1154    Procedure: CHOLECYSTECTOMY LAPAROSCOPIC WITH DAVINCI ROBOT WITH IOC (Abdomen) Diagnosis:       Calculus of gallbladder without cholecystitis without obstruction      (Calculus of gallbladder without cholecystitis without obstruction [K80.20])    Surgeons: Matt Michael MD Provider: Cooper Torres DO    Anesthesia Type: general ASA Status: 3            Anesthesia Type: general    Vitals  Vitals Value Taken Time   /54 08/31/24 1245   Temp 36.7 °C (98.1 °F) 08/31/24 1230   Pulse 73 08/31/24 1246   Resp 16 08/31/24 1215   SpO2 97 % 08/31/24 1246   Vitals shown include unfiled device data.        Post Anesthesia Care and Evaluation    Patient location during evaluation: bedside  Patient participation: complete - patient participated  Level of consciousness: awake and alert  Pain management: adequate    Airway patency: patent  Anesthetic complications: No anesthetic complications  PONV Status: controlled  Cardiovascular status: acceptable and hemodynamically stable  Respiratory status: acceptable, spontaneous ventilation and nonlabored ventilation  Hydration status: acceptable    Comments: /60 (BP Location: Right arm)   Pulse 75   Temp 36.7 °C (98.1 °F) (Oral)   Resp 16   Ht 182.9 cm (72\")   Wt 109 kg (240 lb)   SpO2 97%   BMI 32.55 kg/m²           "

## 2024-08-31 NOTE — PLAN OF CARE
Problem: Adult Inpatient Plan of Care  Goal: Plan of Care Review  8/31/2024 0459 by Magalis Lew RN  Outcome: Ongoing, Progressing  Flowsheets (Taken 8/31/2024 0459)  Outcome Evaluation: NPO since midnight for procedure, Adlib, A&Ox4, IV fluids, family remaining at bedside, requiring oxygen during sleep - patient states has not been diagnosed with sleep apnea (never tested) - desaturation to mid 80's during sleep - hx of COPD, call light within reach.  8/31/2024 0458 by Magalis Lew RN  Outcome: Ongoing, Progressing  Goal: Patient-Specific Goal (Individualized)  8/31/2024 0459 by Magalis Lew RN  Outcome: Ongoing, Progressing  8/31/2024 0458 by Magalis Lew RN  Outcome: Ongoing, Progressing  Goal: Absence of Hospital-Acquired Illness or Injury  8/31/2024 0459 by Magalis Lew RN  Outcome: Ongoing, Progressing  8/31/2024 0458 by Magalis eLw RN  Outcome: Ongoing, Progressing  Intervention: Identify and Manage Fall Risk  Recent Flowsheet Documentation  Taken 8/31/2024 0400 by Magalis Lew RN  Safety Promotion/Fall Prevention:   safety round/check completed   room organization consistent   nonskid shoes/slippers when out of bed   assistive device/personal items within reach   clutter free environment maintained  Taken 8/31/2024 0200 by Magalis Lew RN  Safety Promotion/Fall Prevention:   safety round/check completed   room organization consistent   nonskid shoes/slippers when out of bed   clutter free environment maintained   assistive device/personal items within reach  Taken 8/31/2024 0000 by Magalis Lew RN  Safety Promotion/Fall Prevention:   safety round/check completed   room organization consistent   nonskid shoes/slippers when out of bed   clutter free environment maintained   assistive device/personal items within reach  Taken 8/30/2024 2200 by Magalis Lew RN  Safety Promotion/Fall Prevention:   safety round/check completed   room organization consistent   nonskid shoes/slippers  when out of bed   assistive device/personal items within reach   clutter free environment maintained  Taken 8/30/2024 2000 by Magalis Lew RN  Safety Promotion/Fall Prevention:   safety round/check completed   room organization consistent   nonskid shoes/slippers when out of bed   clutter free environment maintained   assistive device/personal items within reach  Intervention: Prevent Skin Injury  Recent Flowsheet Documentation  Taken 8/31/2024 0400 by Magalis Lew RN  Body Position:   position changed independently   side-lying  Taken 8/31/2024 0200 by Magalis Lew RN  Body Position:   position changed independently   side-lying  Taken 8/31/2024 0000 by Magalis Lew RN  Body Position:   position changed independently   side-lying  Taken 8/30/2024 2200 by Magalis Lew RN  Body Position:   position changed independently   weight shifting  Taken 8/30/2024 2000 by Magalis Lew RN  Body Position:   position changed independently   weight shifting  Intervention: Prevent and Manage VTE (Venous Thromboembolism) Risk  Recent Flowsheet Documentation  Taken 8/31/2024 0000 by Magalis Lew RN  Activity Management: up ad juliana  Taken 8/30/2024 2200 by Magalis Lew RN  Activity Management: up ad juliana  Taken 8/30/2024 2000 by Magalis Lew RN  Activity Management: up ad juliana  Intervention: Prevent Infection  Recent Flowsheet Documentation  Taken 8/31/2024 0400 by Magalis Lew RN  Infection Prevention:   single patient room provided   rest/sleep promoted   environmental surveillance performed  Taken 8/31/2024 0200 by Magalis Lew RN  Infection Prevention:   single patient room provided   rest/sleep promoted   environmental surveillance performed  Taken 8/31/2024 0000 by Magalis Lew RN  Infection Prevention:   single patient room provided   rest/sleep promoted   environmental surveillance performed  Taken 8/30/2024 2200 by Magalis Lew RN  Infection Prevention:   single patient room provided    rest/sleep promoted   environmental surveillance performed  Taken 8/30/2024 2000 by Magalis Lew, RN  Infection Prevention:   rest/sleep promoted   single patient room provided   environmental surveillance performed  Goal: Optimal Comfort and Wellbeing  8/31/2024 0459 by Magalis Lew RN  Outcome: Ongoing, Progressing  8/31/2024 0458 by Magalis Lew RN  Outcome: Ongoing, Progressing  Intervention: Provide Person-Centered Care  Recent Flowsheet Documentation  Taken 8/30/2024 2000 by Magalis Lew RN  Trust Relationship/Rapport:   questions answered   care explained  Goal: Readiness for Transition of Care  8/31/2024 0459 by Magalis Lew RN  Outcome: Ongoing, Progressing  8/31/2024 0458 by Magalis Lew RN  Outcome: Ongoing, Progressing   Goal Outcome Evaluation:              Outcome Evaluation: NPO since midnight for procedure, Adlib, A&Ox4, IV fluids, family remaining at bedside, requiring oxygen during sleep - patient states has not been diagnosed with sleep apnea (never tested) - desaturation to mid 80's during sleep - hx of COPD, call light within reach.

## 2024-08-31 NOTE — NURSING NOTE
Report given to preop- hydralazine and coreg not administered this am per administration orders.  Pt to sign consent in surgery.  Surgery aware of am labs - unable to administer calcium gluconate at this time as med not arrived on unit.

## 2024-08-31 NOTE — PROGRESS NOTES
Name: Ric Roy ADMIT: 2024   : 1955  PCP: Landon Walker MD    MRN: [id number] LOS: 1 days   AGE/SEX: 69 y.o. male  ROOM: [facility] OR/MAIN OR     Subjective   Subjective   Sitting up on side of bed, no new problems today.  Eager for surgery.    Objective   Objective   Vital Signs  Temp:  [97.7 °F (36.5 °C)-99.3 °F (37.4 °C)] 97.7 °F (36.5 °C)  Heart Rate:  [64-91] 67  Resp:  [16-18] 16  BP: (117-167)/(52-77) 153/71  SpO2:  [91 %-97 %] 97 %  on  Flow (L/min):  [2] 2;   Device (Oxygen Therapy): nasal cannula  Body mass index is 32.55 kg/m².  Physical Exam  Constitutional:       Appearance: He is ill-appearing.   Pulmonary:      Effort: Pulmonary effort is normal. No respiratory distress.      Breath sounds: No stridor.   Musculoskeletal:      Right lower leg: No edema.      Left lower leg: No edema.   Skin:     Coloration: Skin is not pale.   Neurological:      Mental Status: He is alert and oriented to person, place, and time.         Results Review     I reviewed the patient's new clinical results.  Results from last 7 days   Lab Units 24  0603 24  1020   WBC 10*3/mm3 5.19 6.79   HEMOGLOBIN g/dL 8.8* 9.9*   PLATELETS 10*3/mm3 183 142     Results from last 7 days   Lab Units 24  0603 24  1020   SODIUM mmol/L 139 136   POTASSIUM mmol/L 3.4* 3.5   CHLORIDE mmol/L 100 94*   CO2 mmol/L 29.0 28.9   BUN mg/dL 33* 34*   CREATININE mg/dL 3.39* 4.14*   GLUCOSE mg/dL 100* 101*   EGFR mL/min/1.73 18.8* 14.8*     Results from last 7 days   Lab Units 24  0603 24  1020   ALBUMIN g/dL 2.7* 3.3*   BILIRUBIN mg/dL 0.3 0.3   ALK PHOS U/L 39 42   AST (SGOT) U/L 17 22   ALT (SGPT) U/L 6 9     Results from last 7 days   Lab Units 24  0603 24  1020   CALCIUM mg/dL 5.7* 6.1*   ALBUMIN g/dL 2.7* 3.3*   MAGNESIUM mg/dL  --  0.8*     Results from last 7 days   Lab Units 24  1020   PROCALCITONIN ng/mL 0.15   LACTATE mmol/L 2.0     Hemoglobin A1C   Date/Time Value  Ref Range Status   08/31/2024 0603 5.60 4.80 - 5.60 % Final     Glucose   Date/Time Value Ref Range Status   08/31/2024 0630 112 70 - 130 mg/dL Final       US Gallbladder    Result Date: 8/30/2024   1. Layering sludge and/or small stones within the gallbladder and a small subcentimeter polyp or adherent stone in the neck of the gallbladder, without specific ultrasound evidence of acute cholecystitis. Correlate with laboratory values. If there is persistent clinical concern, could consider correlating with nuclear medicine hepatobiliary scan if clinically indicated. 2. Simple appearing right renal cysts.  This report was finalized on 8/30/2024 3:57 PM by Aditya Macias MD on Workstation: [id number]      CT Abdomen Pelvis Without Contrast    Result Date: 8/30/2024  1. Moderate right and small left pleural effusions. Right pleural fluid extends partially into the right major fissure. 2. Stomach is decompressed though appears thick-walled and this may be associated with gastritis and could be correlated with clinical data. Consideration could be given to further evaluation with EGD or upper GI. 3. Dense material within the gallbladder due to sludge or stones without CT evidence to suggest cholecystitis. 4. Renal atrophy with multiple bilateral renal cysts, as well small low-density lesions that are too small to characterize, and a left lower pole hyperdense lesion that is likely a hemorrhagic or proteinaceous cyst, though not definitively characterized. 5. Subcutaneous cystic lesion within the lower back fat consistent with a sebaceous cyst and is mildly increased in size compared to prior CT 07/10/2018. 6. 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   Radiation dose reduction techniques were utilized, including automated exposure control and exposure modulation based on body size.   This report was finalized on 8/30/2024 2:32  PM by Maurice Bryant M.D on Workstation: [id number]     Scheduled Medications  atorvastatin, 10 mg, Oral, Daily  [Transfer Hold] calcium gluconate, 1,000 mg, Intravenous, Once  carvedilol, 12.5 mg, Oral, BID With Meals  ceFAZolin, 2,000 mg, Intravenous, Once  gabapentin, 300 mg, Oral, BID  hydrALAZINE, 100 mg, Oral, BID  [Transfer Hold] insulin regular, 2-7 Units, Subcutaneous, Q6H  loperamide, 2 mg, Oral, Daily With Breakfast  magnesium oxide, 400 mg, Oral, BID  pantoprazole, 40 mg, Oral, Q AM  sodium chloride, 10 mL, Intravenous, Q12H  sodium chloride, 10 mL, Intravenous, Q12H  sodium chloride, 3 mL, Intravenous, Q12H  vitamin D, 50,000 Units, Oral, Weekly    Infusions  sodium chloride, 75 mL/hr, Last Rate: 75 mL/hr (08/31/24 0143)  sodium chloride, 9 mL/hr    Diet  NPO Diet NPO Type: Strict NPO       Assessment/Plan     Active Hospital Problems    Diagnosis  POA    **Abdominal pain [R10.9]  Yes    Symptomatic cholelithiasis [K80.20]  Unknown    Chronic kidney disease [N18.9]  Unknown    EDDIE (acute kidney injury) [N17.9]  Unknown    Anemia [D64.9]  Unknown    Chronic diarrhea [K52.9]  Unknown    Hypomagnesemia [E83.42]  Unknown    Calculus of gallbladder without cholecystitis without obstruction [K80.20]  Unknown    Diabetes mellitus [E11.9]  Yes    Hypertension [I10]  Yes    Cervical spinal stenosis [M48.02]  Yes      Resolved Hospital Problems   No resolved problems to display.       69 y.o. male admitted with Abdominal pain.      08/31/24  Plans for laparoscopic cholecystectomy today.  Replace calcium.    Symptomatic cholelithiasis  - general surgery following-lap lucia planned 8/31/2024  -GI evaluated-no endoscopic intervention  -Pain control, bowel regimen     EDDIE on CKD  -Crt 4.14 OA  -Nephrology following    Hypokalemia, hypocalcemia  -replete, monitor BMP    HTN  -Coreg, hydralazine    Flow (L/min):  [2] 2    DVT prophylaxis: SCDs  Discussed with patient, family, and nursing staff.  Anticipated  discharge home, when cleared by consultants            Terry Hurst MD  [facility]  08/31/24  09:06 EDT

## 2024-08-31 NOTE — ANESTHESIA PROCEDURE NOTES
Airway  Urgency: elective    Date/Time: 8/31/2024 10:34 AM  Airway not difficult    General Information and Staff    Patient location during procedure: OR  Anesthesiologist: Cooper Torres DO  CRNA/CAA: Gloria Reynolds CRNA    Indications and Patient Condition  Indications for airway management: airway protection    Preoxygenated: yes  Mask difficulty assessment: 2 - vent by mask + OA or adjuvant +/- NMBA    Final Airway Details  Final airway type: endotracheal airway      Successful airway: ETT  Cuffed: yes   Successful intubation technique: direct laryngoscopy  Facilitating devices/methods: intubating stylet  Endotracheal tube insertion site: oral  Blade: Shafer  Blade size: 2  ETT size (mm): 8.0  Cormack-Lehane Classification: grade I - full view of glottis  Placement verified by: chest auscultation and capnometry   Measured from: lips  ETT/EBT  to lips (cm): 22  Number of attempts at approach: 1  Assessment: lips, teeth, and gum same as pre-op and atraumatic intubation    Additional Comments  Atraumatic, MOP to cuff, BSBE, no change to dentition, secured with tape

## 2024-08-31 NOTE — CONSULTS
[facility]   Consult Note    Patient Name: Ric Roy  : 1955  MRN: [id number]  Primary Care Physician:  Landon Walker MD  Referring Physician: No ref. provider found  Date of admission: 2024    Gastroenterology (on-call MD unless specified)  Consult performed by: Obey Starr MD  Consult ordered by: Estefani Garcia MD        Subjective   Subjective     Reason for Consult/ Chief Complaint: ruq pain     History of Present Illness  Ric Roy is a 69 y.o. male presents with ruq pain. This is worse with eating and has been going on for months. He has chronic renal failure, a shunt and luckily has not required dialysis at this time.  He is scheduled for surgery tomorrow to remove gallbladder. He has documented lymphocytic colitis , Dr Ny at [place] and his last colonoscopy was in  noted small adenoma. And residua of lymphocytic colitis. He uses daily immodium for the colitis.     Review of Systems   Gastrointestinal:  Positive for abdominal pain and diarrhea.   All other systems reviewed and are negative.       Personal History     Past Medical History:   Diagnosis Date    Arthritis     At risk for sleep apnea     7    Back pain     Chronic kidney disease     Cluster headaches     COPD (chronic obstructive pulmonary disease)     COVID     Diabetes mellitus     Foot drop, right     following back surgery nerve imvolvement    GERD (gastroesophageal reflux disease)     History of pancreatitis     Hyperlipidemia     Hypertension     Kidney failure     Microscopic colitis, unspecified microscopic colitis type     Skin abrasion     bilateral lower legs  instructed to let surgeon know about prior to surgery    Stage 3 chronic kidney disease 2018       Past Surgical History:   Procedure Laterality Date    ANTERIOR CERVICAL DISCECTOMY W/ FUSION N/A 2018    Procedure: C4 to C7 anterior cervical discectomy, fusion and instrumentation;  Surgeon: Lavon Rincon MD;   [facility];  Service: Neurosurgery    ARTERIOVENOUS FISTULA/SHUNT SURGERY Left 7/20/2023    Procedure: LEFT ARM ARTERIOVENOUS FISTULA;  Surgeon: Gus Chong II, MD;  Location: [facility];  Service: Vascular;  Laterality: Left;    BACK SURGERY      lumbar x2    COLONOSCOPY      COLONOSCOPY W/ BIOPSIES      PILONIDAL CYST DRAINAGE      TONSILLECTOMY         Family History: family history includes Obesity in an other family member. Otherwise pertinent FHx was reviewed and not pertinent to current issue.    Social History:  reports that he has been smoking cigarettes. He has a 50 pack-year smoking history. He has never used smokeless tobacco. He reports that he does not currently use alcohol. He reports current drug use. Frequency: 2.00 times per week. Drug: Marijuana.    Home Medications:   Chlorhexidine Gluconate Cloth, HYDROcodone-acetaminophen, Torsemide, acetaminophen, albuterol sulfate HFA, carvedilol, ciprofloxacin, doxazosin, fenofibrate micronized, furosemide, gabapentin, hydrALAZINE, loperamide, magnesium oxide, omeprazole, ondansetron, oxyCODONE, pioglitazone, simvastatin, and vitamin D    Allergies:  No Known Allergies    Objective    Objective     Vitals:  Temp:  [98.4 °F (36.9 °C)-99.3 °F (37.4 °C)] 98.4 °F (36.9 °C)  Heart Rate:  [84-91] 91  Resp:  [18] 18  BP: (134-144)/(66-77) 144/66    Physical Exam  HENT:      Right Ear: External ear normal.      Left Ear: External ear normal.      Mouth/Throat:      Pharynx: Oropharynx is clear.   Eyes:      Conjunctiva/sclera: Conjunctivae normal.   Cardiovascular:      Rate and Rhythm: Normal rate.      Pulses: Normal pulses.   Pulmonary:      Effort: Pulmonary effort is normal.   Abdominal:      General: Abdomen is flat.   Skin:     General: Skin is warm.   Neurological:      General: No focal deficit present.      Mental Status: He is alert.   Psychiatric:         Mood and Affect: Mood normal.         Result Review    Result Review:  I have  personally reviewed the results from the time of this admission to 8/30/2024 20:09 EDT and agree with these findings:  []  Laboratory list / accordion  []  Microbiology  []  Radiology  []  EKG/Telemetry   []  Cardiology/Vascular   []  Pathology  []  Old records  []  Other:  Most notable findings include:       Assessment & Plan   Assessment / Plan     Brief Patient Summary:  Ric Roy is a 69 y.o. male who   Cholithiasis symptomatic  Lymphocytic colitis  stable on immodium  Anemia of chronic disease,  the increased LDH and reticulocyte count may suggest element of hemolysis    Active Hospital Problems:  Active Hospital Problems    Diagnosis     **Abdominal pain     Symptomatic cholelithiasis     Chronic kidney disease     EDDIE (acute kidney injury)     Anemia     Chronic diarrhea     Hypomagnesemia     Calculus of gallbladder without cholecystitis without obstruction     Diabetes mellitus     Hypertension     Cervical spinal stenosis      Plan: Agree with cholycystectomy  Defer to admitting team any further evaluation of the anemia   Continue immodium for the lymphocytic colitis which is a chronic and stable condition  Patient to followup Dr Ny at [place]  Signing off as there is no acute gastrointestinal problem that we are currently managing thanks       Obey Starr MD

## 2024-08-31 NOTE — OP NOTE
Date of procedure: 8/31/2024     Primary Surgeon: Dr. Michael     Assistant: Odilia Blankenship CSA that was in charge of retraction, exposure, holding, exchanging instruments during the case, closing wounds and placing dressings that was essential for the success of the case     Preoperative diagnosis: Symptomatic cholelithiasis  Postoperative diagnosis:  Same     Procedure performed:   -Robotic assisted laparoscopic cholecystectomy     Anesthesia: General     EBL: Minimal     Complications: None     Findings:  -Cholelithiasis     Condition of patient: Stable to recovery     Indications: The patient is a very pleasant 69-year-old male with recurrent epigastric and right upper quadrant abdominal pain.  I think he has symptomatic cholelithiasis..  We discussed with the patient about treatment options and I recommended that she undergoes robotic assisted laparoscopic cholecystectomy with intraoperative cholangiogram.  Risk and benefits of procedure including bleeding, infection, biliary tree injury discussed in detail with the patient that verbalized understanding and agreed with the plan     Description: Patient was taken to operative room and she was placed on the OR table in the supine position.  Preoperative antibiotics were given and SCDs were placed.  Patient underwent general endotracheal anesthesia.  Patient abdomen was then prepped and draped in usual sterile fashion.  Timeout was performed the patient was correctly identified.  We accessed the patient abdomen in the left upper quadrant with Optiview technique and insertion of a 5 mm trocar, pneumoperitoneum was insufflated.  Upon exploration of the abdominal cavity there was no evidence of injury from trocar placement. We placed 3 other 8 mm robotic trocars in the left periumbilical, right periumbilical and right flank area.  Initial 5 mm trocar was switched by an 8 mm trocar.  Robotic arms were connected to the trocars and instruments were inserted and then  direct vision.  Patient was positioned in slight reverse Trendelenburg position.  Gallbladder fundus was grasped and retracted cephalad.  The infundibulum of the gallbladder was recognized.  Peritoneal attachments to the gallbladder were taken down at the level of the infundibulum of the gallbladder.  Cystic duct was recognized and dissected circumferentially.  Cystic artery was recognized and dissected circumferentially.  Critical view was obtained with visualization of 2 structures going into the gallbladder with liver behind.  I then performed ICG to confirm the 2 structures were cystic duct and cystic artery and this was true.  I needed to transect the cystic artery between Hem-o-beata's because the anterior location of the artery preventing full access to the cystic duct.  I then perform a small ductotomy in the cystic duct after a Hem-o-beata was placed at the base of the infundibulum.  Although I was able to place the cholangiocatheter and the cystic that this came out after I placed the Hem-o-beata clip.  I decided not to pursue cholangiogram since patient has a clear anatomy and no evidence of elevation of liver enzymes concerning for choledocholithiasis.    1 more Hem-o-beata was placed proximally on the cystic duct and this was transected with hook electrocautery.  There was some spillage of biliary fluid during this part due to small opening at the gallbladder.  This was suctioned completely.    The gallbladder was dissected from the gallbladder bed.  There was minimal bleeding at the gallbladder bed that was controlled with electrocautery.  I then placed the gallbladder into the Endo Catch bag and the gallbladder was removed from the abdomen.  Thorough irrigation of the gallbladder fossa was performed with saline.  We then closed the left upper quadrant trocar with Endo Close technique.   We then removed all the other trocars on under direct camera vision while desufflated the pneumoperitoneum.  Incisions were  closed with 4-0 Monocryl.  The patient tolerated procedure well and she was sent to recovery area in stable condition.  Instrument sponge count were correct.      Matt Michael MD, FACS  General, Minimally Invasive and Endoscopic Surgery  [facility], [place], operated by [facility]    [address] [place] [address], [address]  [place], [place], [address]  P: [phone]  F: [phone]

## 2024-08-31 NOTE — ANESTHESIA PREPROCEDURE EVALUATION
Anesthesia Evaluation     Patient summary reviewed and Nursing notes reviewed   no history of anesthetic complications:   NPO Solid Status: > 8 hours  NPO Liquid Status: > 2 hours           Airway   Mallampati: III  TM distance: >3 FB  Neck ROM: full  Possible difficult intubation and Anterior  Comment: Hx C-spine fusion. ROM appears adequate on exam.   Dental      Pulmonary     breath sounds clear to auscultation  (+) a smoker Current, Smoked day of surgery, COPD moderate,  (-) asthma, shortness of breath, recent URISleep apnea: STOP BANG 4.  Cardiovascular   Exercise tolerance: good (4-7 METS)    ECG reviewed  Rhythm: regular  Rate: normal    (+) hypertension, hyperlipidemia  (-) past MI, CAD, dysrhythmias, angina, cardiac stents      Neuro/Psych  (+) headaches (h/o cluster), weakness (R foot drop following back surgery)  (-) seizures, CVA  GI/Hepatic/Renal/Endo    (+) obesity, GERD, renal disease (Cr 3.39 (his baseline for last yr; improving. 4.14 on admission) Has shunt, not current use)- CRI and ARF, diabetes mellitus type 2  (-) liver disease, no thyroid disorder    ROS Comment: Hypocalcemic  lymphocytic colitis    Musculoskeletal     (+) back pain, chronic pain  (-) neck stiffness  Abdominal    Substance History      OB/GYN          Other   arthritis, blood dyscrasia (Hb 8.8) anemia,                     Anesthesia Plan    ASA 3     general     (I have reviewed the patient's history and chart with the patient, including all pertinent laboratory results and imaging. I have explained the risks of anesthesia including but not limited to dental damage, corneal abrasion, nerve injury, MI, stroke, aspiration, and death. Patient has agreed to proceed.       )  intravenous induction     Anesthetic plan, risks, benefits, and alternatives have been provided, discussed and informed consent has been obtained with: patient.    Use of blood products discussed with patient .      CODE STATUS:    Code Status (Patient has no  pulse and is not breathing): CPR (Attempt to Resuscitate)  Medical Interventions (Patient has pulse or is breathing): Full Support

## 2024-09-01 LAB
25(OH)D3 SERPL-MCNC: 29.5 NG/ML (ref 30–100)
ALBUMIN SERPL-MCNC: 2.7 G/DL (ref 3.5–5.2)
ALBUMIN/GLOB SERPL: 1 G/DL
ALP SERPL-CCNC: 37 U/L (ref 39–117)
ALT SERPL W P-5'-P-CCNC: 7 U/L (ref 1–41)
ANION GAP SERPL CALCULATED.3IONS-SCNC: 9 MMOL/L (ref 5–15)
AST SERPL-CCNC: 24 U/L (ref 1–40)
BASOPHILS # BLD AUTO: 0.02 10*3/MM3 (ref 0–0.2)
BASOPHILS NFR BLD AUTO: 0.3 % (ref 0–1.5)
BILIRUB SERPL-MCNC: 0.2 MG/DL (ref 0–1.2)
BUN SERPL-MCNC: 27 MG/DL (ref 8–23)
BUN/CREAT SERPL: 9 (ref 7–25)
CALCIUM SPEC-SCNC: 5.5 MG/DL (ref 8.6–10.5)
CHLORIDE SERPL-SCNC: 101 MMOL/L (ref 98–107)
CO2 SERPL-SCNC: 25 MMOL/L (ref 22–29)
CREAT SERPL-MCNC: 3.01 MG/DL (ref 0.76–1.27)
CREAT UR-MCNC: 63.1 MG/DL
DEPRECATED RDW RBC AUTO: 39.4 FL (ref 37–54)
EGFRCR SERPLBLD CKD-EPI 2021: 21.7 ML/MIN/1.73
EOSINOPHIL # BLD AUTO: 0.04 10*3/MM3 (ref 0–0.4)
EOSINOPHIL NFR BLD AUTO: 0.6 % (ref 0.3–6.2)
ERYTHROCYTE [DISTWIDTH] IN BLOOD BY AUTOMATED COUNT: 12.7 % (ref 12.3–15.4)
GLOBULIN UR ELPH-MCNC: 2.6 GM/DL
GLUCOSE BLDC GLUCOMTR-MCNC: 107 MG/DL (ref 70–130)
GLUCOSE BLDC GLUCOMTR-MCNC: 120 MG/DL (ref 70–130)
GLUCOSE BLDC GLUCOMTR-MCNC: 121 MG/DL (ref 70–130)
GLUCOSE BLDC GLUCOMTR-MCNC: 126 MG/DL (ref 70–130)
GLUCOSE SERPL-MCNC: 97 MG/DL (ref 65–99)
HCT VFR BLD AUTO: 25.4 % (ref 37.5–51)
HGB BLD-MCNC: 8.2 G/DL (ref 13–17.7)
IMM GRANULOCYTES # BLD AUTO: 0.05 10*3/MM3 (ref 0–0.05)
IMM GRANULOCYTES NFR BLD AUTO: 0.8 % (ref 0–0.5)
LYMPHOCYTES # BLD AUTO: 0.81 10*3/MM3 (ref 0.7–3.1)
LYMPHOCYTES NFR BLD AUTO: 12.5 % (ref 19.6–45.3)
MAGNESIUM SERPL-MCNC: 2.2 MG/DL (ref 1.6–2.4)
MCH RBC QN AUTO: 27.8 PG (ref 26.6–33)
MCHC RBC AUTO-ENTMCNC: 32.3 G/DL (ref 31.5–35.7)
MCV RBC AUTO: 86.1 FL (ref 79–97)
MONOCYTES # BLD AUTO: 0.33 10*3/MM3 (ref 0.1–0.9)
MONOCYTES NFR BLD AUTO: 5.1 % (ref 5–12)
NEUTROPHILS NFR BLD AUTO: 5.21 10*3/MM3 (ref 1.7–7)
NEUTROPHILS NFR BLD AUTO: 80.7 % (ref 42.7–76)
NRBC BLD AUTO-RTO: 0 /100 WBC (ref 0–0.2)
PHOSPHATE SERPL-MCNC: 2.1 MG/DL (ref 2.5–4.5)
PLATELET # BLD AUTO: 156 10*3/MM3 (ref 140–450)
PMV BLD AUTO: 10.7 FL (ref 6–12)
POTASSIUM SERPL-SCNC: 3.1 MMOL/L (ref 3.5–5.2)
PROT ?TM UR-MCNC: 38.9 MG/DL
PROT SERPL-MCNC: 5.3 G/DL (ref 6–8.5)
PROT/CREAT UR: 616.5 MG/G CREA (ref 0–200)
PTH-INTACT SERPL-MCNC: 90.5 PG/ML (ref 15–65)
RBC # BLD AUTO: 2.95 10*6/MM3 (ref 4.14–5.8)
SODIUM SERPL-SCNC: 135 MMOL/L (ref 136–145)
URATE SERPL-MCNC: 9.3 MG/DL (ref 3.4–7)
WBC NRBC COR # BLD AUTO: 6.46 10*3/MM3 (ref 3.4–10.8)

## 2024-09-01 PROCEDURE — 82570 ASSAY OF URINE CREATININE: CPT | Performed by: SURGERY

## 2024-09-01 PROCEDURE — 82948 REAGENT STRIP/BLOOD GLUCOSE: CPT

## 2024-09-01 PROCEDURE — 85025 COMPLETE CBC W/AUTO DIFF WBC: CPT | Performed by: SURGERY

## 2024-09-01 PROCEDURE — 25810000003 SODIUM CHLORIDE 0.9 % SOLUTION: Performed by: SURGERY

## 2024-09-01 PROCEDURE — 84156 ASSAY OF PROTEIN URINE: CPT | Performed by: SURGERY

## 2024-09-01 PROCEDURE — 83970 ASSAY OF PARATHORMONE: CPT | Performed by: SURGERY

## 2024-09-01 PROCEDURE — 25010000002 CALCIUM GLUCONATE 2-0.675 GM/100ML-% SOLUTION: Performed by: STUDENT IN AN ORGANIZED HEALTH CARE EDUCATION/TRAINING PROGRAM

## 2024-09-01 PROCEDURE — 82306 VITAMIN D 25 HYDROXY: CPT | Performed by: SURGERY

## 2024-09-01 PROCEDURE — 80053 COMPREHEN METABOLIC PANEL: CPT | Performed by: SURGERY

## 2024-09-01 PROCEDURE — 84550 ASSAY OF BLOOD/URIC ACID: CPT | Performed by: SURGERY

## 2024-09-01 PROCEDURE — 99024 POSTOP FOLLOW-UP VISIT: CPT | Performed by: SURGERY

## 2024-09-01 PROCEDURE — 25010000002 CALCIUM GLUCONATE-NACL 1-0.675 GM/50ML-% SOLUTION: Performed by: STUDENT IN AN ORGANIZED HEALTH CARE EDUCATION/TRAINING PROGRAM

## 2024-09-01 PROCEDURE — 84100 ASSAY OF PHOSPHORUS: CPT | Performed by: SURGERY

## 2024-09-01 PROCEDURE — 83735 ASSAY OF MAGNESIUM: CPT | Performed by: SURGERY

## 2024-09-01 RX ORDER — POTASSIUM CHLORIDE 750 MG/1
40 TABLET, FILM COATED, EXTENDED RELEASE ORAL ONCE
Status: COMPLETED | OUTPATIENT
Start: 2024-09-01 | End: 2024-09-01

## 2024-09-01 RX ORDER — CALCIUM GLUCONATE 20 MG/ML
2000 INJECTION, SOLUTION INTRAVENOUS
Status: COMPLETED | OUTPATIENT
Start: 2024-09-01 | End: 2024-09-01

## 2024-09-01 RX ORDER — CALCIUM GLUCONATE 20 MG/ML
1000 INJECTION, SOLUTION INTRAVENOUS
Status: COMPLETED | OUTPATIENT
Start: 2024-09-01 | End: 2024-09-01

## 2024-09-01 RX ADMIN — CALCIUM GLUCONATE 2000 MG: 20 INJECTION, SOLUTION INTRAVENOUS at 09:37

## 2024-09-01 RX ADMIN — CALCIUM GLUCONATE 1000 MG: 20 INJECTION, SOLUTION INTRAVENOUS at 08:23

## 2024-09-01 RX ADMIN — SODIUM CHLORIDE 75 ML/HR: 9 INJECTION, SOLUTION INTRAVENOUS at 06:35

## 2024-09-01 RX ADMIN — HYDRALAZINE HYDROCHLORIDE 100 MG: 50 TABLET ORAL at 21:20

## 2024-09-01 RX ADMIN — POTASSIUM CHLORIDE 40 MEQ: 750 TABLET, EXTENDED RELEASE ORAL at 11:40

## 2024-09-01 RX ADMIN — CALCIUM GLUCONATE 2000 MG: 20 INJECTION, SOLUTION INTRAVENOUS at 14:06

## 2024-09-01 RX ADMIN — CALCIUM GLUCONATE 2000 MG: 20 INJECTION, SOLUTION INTRAVENOUS at 11:40

## 2024-09-01 RX ADMIN — MAGNESIUM OXIDE 400 MG (241.3 MG MAGNESIUM) TABLET 400 MG: TABLET at 08:17

## 2024-09-01 RX ADMIN — ATORVASTATIN CALCIUM 10 MG: 20 TABLET, FILM COATED ORAL at 08:17

## 2024-09-01 RX ADMIN — GABAPENTIN 300 MG: 300 CAPSULE ORAL at 08:17

## 2024-09-01 RX ADMIN — HYDROCODONE BITARTRATE AND ACETAMINOPHEN 1 TABLET: 7.5; 325 TABLET ORAL at 08:23

## 2024-09-01 RX ADMIN — PANTOPRAZOLE SODIUM 40 MG: 40 TABLET, DELAYED RELEASE ORAL at 06:35

## 2024-09-01 RX ADMIN — Medication 10 ML: at 21:21

## 2024-09-01 RX ADMIN — CARVEDILOL 12.5 MG: 12.5 TABLET, FILM COATED ORAL at 18:19

## 2024-09-01 RX ADMIN — CARVEDILOL 12.5 MG: 12.5 TABLET, FILM COATED ORAL at 08:23

## 2024-09-01 RX ADMIN — GABAPENTIN 300 MG: 300 CAPSULE ORAL at 21:21

## 2024-09-01 RX ADMIN — HYDRALAZINE HYDROCHLORIDE 100 MG: 50 TABLET ORAL at 08:23

## 2024-09-01 RX ADMIN — MAGNESIUM OXIDE 400 MG (241.3 MG MAGNESIUM) TABLET 400 MG: TABLET at 21:21

## 2024-09-01 NOTE — PLAN OF CARE
Goal Outcome Evaluation:              Outcome Evaluation: pt alert and oriented x 4 - spouse at bedised.  up ad juliana- frequent walks around nurses station.  abdominal lap sites clen dry and intact - treated with prn pain medication x 1 this shift. Calcium and Potassium replaced per protocol.

## 2024-09-01 NOTE — DISCHARGE INSTRUCTIONS
POST OP RECOMMENDATIONS  Dr. Matt Michael  [phone]  Discharge Gall Bladder Surgery    Go home, rest and take it easy today; however, you should get up and move about several times today to reduce the risk of developing a blood clot in your legs.   You may experience some dizziness or memory loss from the anesthesia. This may last for the next 24 hours. Someone should plan on staying with you for the first 24 hours for your safety.   Do not make any important legal decisions or sign any legal papers for the next 24 hours.   Eat and drink lightly today. Start off with liquids, jello, soup, crackers or other bland foods at first. You may advance your diet tomorrow as tolerated as long as you do not experience any nausea or vomiting.   You may remove your outer dressings in 3 days. The white tapes called steri-strips should stay in place. They will fall off on their own in 1-2 weeks. Do not worry if they come off sooner.   You may notice some bleeding/drainage on your outer dressings. A little bloody drainage is normal. If the bleeding/drainage is such that the bandage cannot absorb it, remove the dressing, apply clean gauze and apply firm pressure for a full 15 minutes. If the bleeding continues, please call me.   You may shower tomorrow. No tub baths until your incisions are completely healed.   You have received a prescription for a narcotic pain medicine, as you will have some pain following surgery.  You will not be totally pain free, but your pain medicine should make the pain tolerable. Please take your pain medicine as prescribed and always take your pills with food to prevent nausea. If you are having severe pain that cannot be controlled by the pain medicine, please contact me.   You have also received a prescription for an anti-nausea medicine. Please take this as prescribed for any nausea or vomiting. Nausea could be a result of the anesthesia or a result of the narcotic pain medicine. If you experience  severe nausea and vomiting that cannot be controlled by the nausea medicine, please call me.   It is not unusual to experience pain/discomfort in your shoulders or your ribs after surgery. It is from the gas used during the laparoscopic procedure and usually lasts 1-3 days. The prescription pain medicine is used to treat the surgical pain and does not typically alleviate this “gassy” pain.   No driving for 24 hours and for as long as you are taking your prescription pain medicine.   You will need to call the office at [phone] to schedule a follow-up appointment in 6-10 days.   Remember to contact me for any of the following:   Fever > 101 degrees  Severe pain that cannot be controlled by taking your pain pills  Severe nausea or vomiting that cannot be controlled by taking your nausea pills  Significant bleeding of your incisions  Drainage that has a bad smell or is yellow or green in appearance  Any other questions or concerns

## 2024-09-01 NOTE — PROGRESS NOTES
Postoperative day 1 status post robotic cholecystectomy    S: No events overnight.  Feeling better.  No nausea or vomiting    O:   Vitals:    08/31/24 2004 08/31/24 2359 09/01/24 0821 09/01/24 1400   BP: 152/70 143/66 141/63 113/85   BP Location: Right arm Right arm Right arm Right arm   Patient Position: Lying Lying Sitting Sitting   Pulse: 69  69 58   Resp: 16 16 18 20   Temp: 97.5 °F (36.4 °C) 97.5 °F (36.4 °C) 98.2 °F (36.8 °C) 97.3 °F (36.3 °C)   TempSrc: Oral Oral Oral Oral   SpO2: 90% 94%  98%   Weight:       Height:          Alert, no acute distress  Breathing comfortable  Abdomen soft, appropriate tender    White blood cell count 6.4, hemoglobin 8.2  LFTs within normal limits  Potassium 5.5    Assessment and plan    69-year-old male status post robotic cholecystectomy with IOC.  No complication from the procedure.  Tolerating regular diet    Continue regular diet  Can be discharged anytime from surgical standpoint whenever medically ready  Follow-up in my office in 2 weeks  Discharge instructions placed in chart

## 2024-09-01 NOTE — PROGRESS NOTES
Name: Ric Roy ADMIT: 2024   : 1955  PCP: Landon Walker MD    MRN: [id number] LOS: 2 days   AGE/SEX: 69 y.o. male  ROOM: [facility]     Subjective   Subjective   Sitting up in chair, feeling good this morning.  No abdominal pain.    Objective   Objective   Vital Signs  Temp:  [97.5 °F (36.4 °C)-98.2 °F (36.8 °C)] 98.2 °F (36.8 °C)  Heart Rate:  [69-81] 69  Resp:  [16-18] 18  BP: (121-152)/(46-78) 141/63  SpO2:  [90 %-97 %] 94 %  on  Flow (L/min):  [2] 2;   Device (Oxygen Therapy): room air  Body mass index is 32.55 kg/m².  Physical Exam  Pulmonary:      Effort: Pulmonary effort is normal. No respiratory distress.      Breath sounds: No stridor.   Abdominal:      Comments: Surgical sites clean dry intact, covered with Dermabond   Musculoskeletal:      Right lower leg: No edema.      Left lower leg: No edema.   Skin:     Coloration: Skin is not pale.   Neurological:      Mental Status: He is alert and oriented to person, place, and time.         Results Review     I reviewed the patient's new clinical results.  Results from last 7 days   Lab Units 24  0517 24  0603 24  1020   WBC 10*3/mm3 6.46 5.19 6.79   HEMOGLOBIN g/dL 8.2* 8.8* 9.9*   PLATELETS 10*3/mm3 156 183 142     Results from last 7 days   Lab Units 24  0517 24  1455 24  0603 24  1020   SODIUM mmol/L 135* 135* 139 136   POTASSIUM mmol/L 3.1* 3.9 3.4* 3.5   CHLORIDE mmol/L 101 97* 100 94*   CO2 mmol/L 25.0 28.6 29.0 28.9   BUN mg/dL 27* 31* 33* 34*   CREATININE mg/dL 3.01* 3.62* 3.39* 4.14*   GLUCOSE mg/dL 97 152* 100* 101*   EGFR mL/min/1.73 21.7* 17.4* 18.8* 14.8*     Results from last 7 days   Lab Units 24  1455 24  0603 24  1020   ALBUMIN g/dL 2.7* 3.0* 2.7* 3.3*   BILIRUBIN mg/dL 0.2  --  0.3 0.3   ALK PHOS U/L 37*  --  39 42   AST (SGOT) U/L 24  --  17 22   ALT (SGPT) U/L 7  --  6 9     Results from last 7 days   Lab Units 24  1455  08/31/24  0603 08/30/24  1020   CALCIUM mg/dL 5.5* 6.0* 5.7* 6.1*   ALBUMIN g/dL 2.7* 3.0* 2.7* 3.3*   MAGNESIUM mg/dL 2.2 1.2*  --  0.8*   PHOSPHORUS mg/dL 2.1* 2.6  --   --      Results from last 7 days   Lab Units 08/30/24  1020   PROCALCITONIN ng/mL 0.15   LACTATE mmol/L 2.0     Hemoglobin A1C   Date/Time Value Ref Range Status   08/31/2024 0603 5.60 4.80 - 5.60 % Final     Glucose   Date/Time Value Ref Range Status   09/01/2024 0611 121 70 - 130 mg/dL Final   08/31/2024 2022 215 (H) 70 - 130 mg/dL Final   08/31/2024 1649 180 (H) 70 - 130 mg/dL Final   08/31/2024 1157 154 (H) 70 - 130 mg/dL Final   08/31/2024 0630 112 70 - 130 mg/dL Final       US Gallbladder    Result Date: 8/30/2024   1. Layering sludge and/or small stones within the gallbladder and a small subcentimeter polyp or adherent stone in the neck of the gallbladder, without specific ultrasound evidence of acute cholecystitis. Correlate with laboratory values. If there is persistent clinical concern, could consider correlating with nuclear medicine hepatobiliary scan if clinically indicated. 2. Simple appearing right renal cysts.  This report was finalized on 8/30/2024 3:57 PM by Aditya Macias MD on Workstation: [id number]      CT Abdomen Pelvis Without Contrast    Result Date: 8/30/2024  1. Moderate right and small left pleural effusions. Right pleural fluid extends partially into the right major fissure. 2. Stomach is decompressed though appears thick-walled and this may be associated with gastritis and could be correlated with clinical data. Consideration could be given to further evaluation with EGD or upper GI. 3. Dense material within the gallbladder due to sludge or stones without CT evidence to suggest cholecystitis. 4. Renal atrophy with multiple bilateral renal cysts, as well small low-density lesions that are too small to characterize, and a left lower pole hyperdense lesion that is likely a hemorrhagic or proteinaceous cyst, though not  definitively characterized. 5. Subcutaneous cystic lesion within the lower back fat consistent with a sebaceous cyst and is mildly increased in size compared to prior CT 07/10/2018. 6. 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   Radiation dose reduction techniques were utilized, including automated exposure control and exposure modulation based on body size.   This report was finalized on 8/30/2024 2:32 PM by Maurice Bryant M.D on Workstation: [id number]     Scheduled Medications  atorvastatin, 10 mg, Oral, Daily  calcium gluconate, 1,000 mg, Intravenous, Q1H   Followed by  calcium gluconate, 2,000 mg, Intravenous, Q2H  carvedilol, 12.5 mg, Oral, BID With Meals  gabapentin, 300 mg, Oral, BID  hydrALAZINE, 100 mg, Oral, BID  insulin regular, 2-7 Units, Subcutaneous, Q6H  magnesium oxide, 400 mg, Oral, BID  pantoprazole, 40 mg, Oral, Q AM  potassium chloride, 40 mEq, Oral, Once  sodium chloride, 10 mL, Intravenous, Q12H  vitamin D, 50,000 Units, Oral, Weekly    Infusions  sodium chloride, 75 mL/hr, Last Rate: 75 mL/hr (09/01/24 0635)  sodium chloride, 9 mL/hr, Last Rate: 9 mL/hr (08/31/24 1025)    Diet  Diet: Diabetic; Consistent Carbohydrate; Fluid Consistency: Thin (IDDSI 0)       Assessment/Plan     Active Hospital Problems    Diagnosis  POA    **Abdominal pain [R10.9]  Yes    Chronic kidney disease [N18.9]  Unknown    EDDIE (acute kidney injury) [N17.9]  Unknown    Anemia [D64.9]  Unknown    Chronic diarrhea [K52.9]  Unknown    Hypomagnesemia [E83.42]  Unknown    Calculus of gallbladder without cholecystitis without obstruction [K80.20]  Unknown    Diabetes mellitus [E11.9]  Yes    Hypertension [I10]  Yes    Cervical spinal stenosis [M48.02]  Yes      Resolved Hospital Problems    Diagnosis Date Resolved POA    Symptomatic cholelithiasis [K80.20] 08/31/2024 Unknown       69 y.o. male admitted with Abdominal  pain.      09/01/24  Cont IVF, advance diet. Replete K and Ca.     Symptomatic cholelithiasis  - general surgery following-lap lucia planned 8/31/2024  -GI evaluated-no endoscopic intervention  -Pain control, bowel regimen     EDDIE on CKD  -Crt 4.14 OA > 3.01  -Nephrology following    Hypokalemia, hypocalcemia  -replete, monitor BMP    HTN  -Coreg, hydralazine    Nocturnal hypoxia  -Patient reportedly with NICOLE though he has never had formal sleep study, recommend outpatient follow-up with sleep medicine    Flow (L/min):  [2] 2    DVT prophylaxis: SCDs  Discussed with patient, family, and nursing staff.  Anticipated discharge home, when cleared by consultants            Terry Hurst MD  [facility]  09/01/24  09:13 EDT

## 2024-09-01 NOTE — PLAN OF CARE
Goal Outcome Evaluation:              Outcome Evaluation: Patient a/o x4, cooperative with care. POD 0 for lucia lap with Divinc[employer] robot, 4 lap sites, clean, dry, and intact. Pt ambulated with no issues, cont b/b. No new issues noted. See v/s and labs. Wife at bedside. This RN wore appropriate PPE during care.

## 2024-09-01 NOTE — PROGRESS NOTES
[facility] Progress Note      Patient Name: Ric Roy  : 1955  MRN: [id number]  Primary Care Physician:  Landon Walker MD  Date of admission: 2024    Subjective     Interval History:   Follow-up chronic kidney disease    The patient had laparoscopic cholecystectomy yesterday without any difficulties, he is feeling much better today denies any chest pain or shortness of air, no orthopnea or PND, no nausea or vomiting, no dysuria or gross hematuria.    Review of Systems:   As noted above    Objective     Vitals:   Temp:  [97.5 °F (36.4 °C)-98.2 °F (36.8 °C)] 98.2 °F (36.8 °C)  Heart Rate:  [69-81] 69  Resp:  [16-18] 18  BP: (121-152)/(46-78) 141/63  Flow (L/min):  [2] 2    Intake/Output Summary (Last 24 hours) at 2024 0907  Last data filed at 2024 1146  Gross per 24 hour   Intake 350 ml   Output 5 ml   Net 345 ml       Physical Exam:    General Appearance: alert, awake, chronically, no acute distress   Skin: warm and dry  HEENT: oral mucosa normal, nonicteric sclera  Neck: supple, no JVD  Lungs: CTA, unlabored breathing effort  Heart: RRR, no rub  Abdomen: soft, nontender, nondistended  : no palpable bladder  Extremities: 1+ lower extremity edema  Neuro: normal speech and mental status     Scheduled Meds:     atorvastatin, 10 mg, Oral, Daily  calcium gluconate, 1,000 mg, Intravenous, Q1H   Followed by  calcium gluconate, 2,000 mg, Intravenous, Q2H  carvedilol, 12.5 mg, Oral, BID With Meals  gabapentin, 300 mg, Oral, BID  hydrALAZINE, 100 mg, Oral, BID  insulin regular, 2-7 Units, Subcutaneous, Q6H  magnesium oxide, 400 mg, Oral, BID  pantoprazole, 40 mg, Oral, Q AM  sodium chloride, 10 mL, Intravenous, Q12H  vitamin D, 50,000 Units, Oral, Weekly      IV Meds:   sodium chloride, 75 mL/hr, Last Rate: 75 mL/hr (24 0635)  sodium chloride, 9 mL/hr, Last Rate: 9 mL/hr (24 1025)        Results Reviewed:   I have personally reviewed the results from  the time of this admission to 9/1/2024 09:07 EDT     Results from last 7 days   Lab Units 09/01/24  0517 08/31/24  1455 08/31/24  0603 08/30/24  1020   SODIUM mmol/L 135* 135* 139 136   POTASSIUM mmol/L 3.1* 3.9 3.4* 3.5   CHLORIDE mmol/L 101 97* 100 94*   CO2 mmol/L 25.0 28.6 29.0 28.9   BUN mg/dL 27* 31* 33* 34*   CREATININE mg/dL 3.01* 3.62* 3.39* 4.14*   CALCIUM mg/dL 5.5* 6.0* 5.7* 6.1*   BILIRUBIN mg/dL 0.2  --  0.3 0.3   ALK PHOS U/L 37*  --  39 42   ALT (SGPT) U/L 7  --  6 9   AST (SGOT) U/L 24  --  17 22   GLUCOSE mg/dL 97 152* 100* 101*       Estimated Creatinine Clearance: 29.6 mL/min (A) (by C-G formula based on SCr of 3.01 mg/dL (H)).    Results from last 7 days   Lab Units 09/01/24  0517 08/31/24  1455 08/30/24  1020   MAGNESIUM mg/dL 2.2 1.2* 0.8*   PHOSPHORUS mg/dL 2.1* 2.6  --        Results from last 7 days   Lab Units 09/01/24  0517   URIC ACID mg/dL 9.3*       Results from last 7 days   Lab Units 09/01/24  0517 08/31/24  0603 08/30/24  1020   WBC 10*3/mm3 6.46 5.19 6.79   HEMOGLOBIN g/dL 8.2* 8.8* 9.9*   PLATELETS 10*3/mm3 156 183 142       Results from last 7 days   Lab Units 08/30/24  1020   INR  1.24*       Assessment / Plan     ASSESSMENT:  End-stage renal disease stage IV creatinine today down to 3.01, associate with longstanding hypertensive and diabetic glomerulosclerosis, mild volume excess with edema, he has hypokalemia and hypocalcemia.  Hypomagnesemia magnesium on admission was 0.8 given IV magnesium yesterday up to 2.2 but probably not to replete enough since magnesium mainly is this is dominantly intracellular, and it lags behind when given IV.  Hypocalcemia and hypokalemia associate with hypomagnesemia being repleted  Acute cholecystitis status post laparoscopic cholecystectomy on 8/31/2024.  Diabetes mellitus type 2 with renal complication  Anemia of chronic kidney disease hemoglobin 8.2, will monitor.    PLAN:  Replete calcium and potassium monitor magnesium and replete as  needed  No diuretics today  Surveillance labs    I reviewed the chart and other providers notes, reviewed labs.  Discussed the case with the patient and he voiced good understanding.    Thank you for involving us in the care of Ric Roy.  Please feel free to call with any questions.    Donal Castro MD  09/01/24  09:07 EDT    [facility]  [phone]    Please note that portions of this note were completed with a voice recognition program.

## 2024-09-02 ENCOUNTER — READMISSION MANAGEMENT (OUTPATIENT)
Dept: CALL CENTER | Facility: HOSPITAL | Age: 69
End: 2024-09-02
Payer: MEDICARE

## 2024-09-02 VITALS
WEIGHT: 240 LBS | HEART RATE: 69 BPM | DIASTOLIC BLOOD PRESSURE: 79 MMHG | SYSTOLIC BLOOD PRESSURE: 158 MMHG | RESPIRATION RATE: 18 BRPM | BODY MASS INDEX: 32.51 KG/M2 | TEMPERATURE: 97.9 F | OXYGEN SATURATION: 98 % | HEIGHT: 72 IN

## 2024-09-02 LAB
ALBUMIN SERPL-MCNC: 2.9 G/DL (ref 3.5–5.2)
ALBUMIN/GLOB SERPL: 1 G/DL
ALP SERPL-CCNC: 45 U/L (ref 39–117)
ALT SERPL W P-5'-P-CCNC: 7 U/L (ref 1–41)
ANION GAP SERPL CALCULATED.3IONS-SCNC: 9 MMOL/L (ref 5–15)
AST SERPL-CCNC: 20 U/L (ref 1–40)
BASOPHILS # BLD AUTO: 0.05 10*3/MM3 (ref 0–0.2)
BASOPHILS NFR BLD AUTO: 0.8 % (ref 0–1.5)
BILIRUB SERPL-MCNC: 0.2 MG/DL (ref 0–1.2)
BUN SERPL-MCNC: 28 MG/DL (ref 8–23)
BUN/CREAT SERPL: 9.1 (ref 7–25)
CALCIUM SPEC-SCNC: 7.4 MG/DL (ref 8.6–10.5)
CHLORIDE SERPL-SCNC: 102 MMOL/L (ref 98–107)
CO2 SERPL-SCNC: 25 MMOL/L (ref 22–29)
CREAT SERPL-MCNC: 3.09 MG/DL (ref 0.76–1.27)
DEPRECATED RDW RBC AUTO: 39.3 FL (ref 37–54)
EGFRCR SERPLBLD CKD-EPI 2021: 21 ML/MIN/1.73
EOSINOPHIL # BLD AUTO: 0.17 10*3/MM3 (ref 0–0.4)
EOSINOPHIL NFR BLD AUTO: 2.8 % (ref 0.3–6.2)
ERYTHROCYTE [DISTWIDTH] IN BLOOD BY AUTOMATED COUNT: 12.6 % (ref 12.3–15.4)
GLOBULIN UR ELPH-MCNC: 2.9 GM/DL
GLUCOSE BLDC GLUCOMTR-MCNC: 117 MG/DL (ref 70–130)
GLUCOSE BLDC GLUCOMTR-MCNC: 121 MG/DL (ref 70–130)
GLUCOSE SERPL-MCNC: 101 MG/DL (ref 65–99)
HCT VFR BLD AUTO: 26.6 % (ref 37.5–51)
HGB BLD-MCNC: 8.5 G/DL (ref 13–17.7)
IMM GRANULOCYTES # BLD AUTO: 0.05 10*3/MM3 (ref 0–0.05)
IMM GRANULOCYTES NFR BLD AUTO: 0.8 % (ref 0–0.5)
LYMPHOCYTES # BLD AUTO: 1.08 10*3/MM3 (ref 0.7–3.1)
LYMPHOCYTES NFR BLD AUTO: 17.9 % (ref 19.6–45.3)
MAGNESIUM SERPL-MCNC: 1.5 MG/DL (ref 1.6–2.4)
MCH RBC QN AUTO: 27.6 PG (ref 26.6–33)
MCHC RBC AUTO-ENTMCNC: 32 G/DL (ref 31.5–35.7)
MCV RBC AUTO: 86.4 FL (ref 79–97)
MONOCYTES # BLD AUTO: 0.38 10*3/MM3 (ref 0.1–0.9)
MONOCYTES NFR BLD AUTO: 6.3 % (ref 5–12)
NEUTROPHILS NFR BLD AUTO: 4.32 10*3/MM3 (ref 1.7–7)
NEUTROPHILS NFR BLD AUTO: 71.4 % (ref 42.7–76)
NRBC BLD AUTO-RTO: 0 /100 WBC (ref 0–0.2)
PHOSPHATE SERPL-MCNC: 2 MG/DL (ref 2.5–4.5)
PLATELET # BLD AUTO: 150 10*3/MM3 (ref 140–450)
PMV BLD AUTO: 10.9 FL (ref 6–12)
POTASSIUM SERPL-SCNC: 4.5 MMOL/L (ref 3.5–5.2)
PROT SERPL-MCNC: 5.8 G/DL (ref 6–8.5)
RBC # BLD AUTO: 3.08 10*6/MM3 (ref 4.14–5.8)
SODIUM SERPL-SCNC: 136 MMOL/L (ref 136–145)
URATE SERPL-MCNC: 8.9 MG/DL (ref 3.4–7)
WBC NRBC COR # BLD AUTO: 6.05 10*3/MM3 (ref 3.4–10.8)

## 2024-09-02 PROCEDURE — 84100 ASSAY OF PHOSPHORUS: CPT | Performed by: INTERNAL MEDICINE

## 2024-09-02 PROCEDURE — 25010000002 MAGNESIUM SULFATE IN D5W 1G/100ML (PREMIX) 1-5 GM/100ML-% SOLUTION: Performed by: STUDENT IN AN ORGANIZED HEALTH CARE EDUCATION/TRAINING PROGRAM

## 2024-09-02 PROCEDURE — 25810000003 SODIUM CHLORIDE 0.9 % SOLUTION: Performed by: SURGERY

## 2024-09-02 PROCEDURE — 80053 COMPREHEN METABOLIC PANEL: CPT | Performed by: INTERNAL MEDICINE

## 2024-09-02 PROCEDURE — 82948 REAGENT STRIP/BLOOD GLUCOSE: CPT

## 2024-09-02 PROCEDURE — 85025 COMPLETE CBC W/AUTO DIFF WBC: CPT | Performed by: INTERNAL MEDICINE

## 2024-09-02 PROCEDURE — 83735 ASSAY OF MAGNESIUM: CPT | Performed by: INTERNAL MEDICINE

## 2024-09-02 PROCEDURE — 84550 ASSAY OF BLOOD/URIC ACID: CPT | Performed by: INTERNAL MEDICINE

## 2024-09-02 RX ORDER — MAGNESIUM SULFATE 1 G/100ML
1 INJECTION INTRAVENOUS
Status: COMPLETED | OUTPATIENT
Start: 2024-09-02 | End: 2024-09-02

## 2024-09-02 RX ORDER — CALCIUM CARBONATE 500 MG/1
2 TABLET, CHEWABLE ORAL DAILY
Qty: 30 TABLET | Refills: 0 | Status: SHIPPED | OUTPATIENT
Start: 2024-09-02

## 2024-09-02 RX ADMIN — CARVEDILOL 12.5 MG: 12.5 TABLET, FILM COATED ORAL at 08:49

## 2024-09-02 RX ADMIN — PANTOPRAZOLE SODIUM 40 MG: 40 TABLET, DELAYED RELEASE ORAL at 07:00

## 2024-09-02 RX ADMIN — ATORVASTATIN CALCIUM 10 MG: 20 TABLET, FILM COATED ORAL at 08:49

## 2024-09-02 RX ADMIN — MAGNESIUM SULFATE IN DEXTROSE 1 G: 10 INJECTION, SOLUTION INTRAVENOUS at 10:05

## 2024-09-02 RX ADMIN — MAGNESIUM OXIDE 400 MG (241.3 MG MAGNESIUM) TABLET 400 MG: TABLET at 08:49

## 2024-09-02 RX ADMIN — MAGNESIUM SULFATE IN DEXTROSE 1 G: 10 INJECTION, SOLUTION INTRAVENOUS at 08:49

## 2024-09-02 RX ADMIN — GABAPENTIN 300 MG: 300 CAPSULE ORAL at 08:49

## 2024-09-02 RX ADMIN — MAGNESIUM SULFATE IN DEXTROSE 1 G: 10 INJECTION, SOLUTION INTRAVENOUS at 11:19

## 2024-09-02 RX ADMIN — HYDRALAZINE HYDROCHLORIDE 100 MG: 50 TABLET ORAL at 08:49

## 2024-09-02 RX ADMIN — SODIUM CHLORIDE 75 ML/HR: 9 INJECTION, SOLUTION INTRAVENOUS at 03:39

## 2024-09-02 NOTE — PLAN OF CARE
Goal Outcome Evaluation:              Outcome Evaluation: Patient a/o x4, cooperative with care. wife at bedside this shift. Pt up ad juliana with spouse. Lap sites clean, dry and intact. All meds given as ordered. Pt anxious to be d/c. No new issues noted. See v/s and labs.

## 2024-09-02 NOTE — DISCHARGE SUMMARY
"    Patient Name: Ric Roy  : 1955  MRN: [id number]    Date of Admission: 2024  Date of Discharge:  2024  Primary Care Physician: Landon Walker MD      Chief Complaint:   Abdominal Pain and Diarrhea      Discharge Diagnoses     Active Hospital Problems    Diagnosis  POA    **Abdominal pain [R10.9]  Yes    Chronic kidney disease [N18.9]  Unknown    EDDIE (acute kidney injury) [N17.9]  Unknown    Anemia [D64.9]  Unknown    Chronic diarrhea [K52.9]  Unknown    Hypomagnesemia [E83.42]  Unknown    Calculus of gallbladder without cholecystitis without obstruction [K80.20]  Unknown    Diabetes mellitus [E11.9]  Yes    Hypertension [I10]  Yes    Cervical spinal stenosis [M48.02]  Yes      Resolved Hospital Problems    Diagnosis Date Resolved POA    Symptomatic cholelithiasis [K80.20] 2024 Unknown        Admitting HPI     \"Patient is a 69-year-old male with complicated past medical history including history of progressive chronic kidney disease, diabetes, hypertension, dyslipidemia as well as history of microscopic colitis with chronic diarrhea was in his usual state of his health until 3 months ago when he developed episodes of off-and-on abdominal pain worse after eating a meal . Patient was having episodes of sweats and fever in between. They initially thought that it was because of his progressive kidney disease and after discussion with his nephrologist over the course of last 3 months it was concluded that this kidney had nothing to do with it. Patient did have significant swelling and fluid buildup couple weeks ago for which his diuretics were adjusted. In this background because of the worsening pain and discomfort he contacted his primary care provider and he was started on Cipro and Flagyl with instructions that if the pain and discomfort and fever and chills does not get any better then he needs to go to the emergency room for further evaluation. Workup in the emergency room " "included CT scan of the abdomen and pelvis as well as blood work which showed cholelithiasis with normal LFTs but did show worsening kidney function. Patient denies any excessive use of NSAIDs and his diarrheal pattern is not changed. Patient is being admitted for management of symptomatic cholelithiasis with episodic cholecystitis was not present at this time admission and for general surgery and GI evaluation. \"    Hospital Course     Pt admitted for abdominal pain.  Seen in consultation with nephrology and general surgery.  CT on admission showed cholelithiasis.  Given chronic nature surgery thought this pain was related to symptomatic cholelithiasis and recommended laparoscopic cholecystectomy which she underwent on 8/31/2024.  Nephrology followed for EDDIE which has\ resolved with Crt at baseline.  He was treated for severe electrolyte derangements which have also improved.  Plan to resume Lasix at 80 mg twice daily on discharge.  He will follow-up with nephrology in the next couple weeks.  Symptomatically vastly improved and stable for discharge home.    Discharge Plan     Symptomatic cholelithiasis  - general surgery following-lap lucia 8/31/2024  -GI evaluated-no endoscopic intervention     EDDIE on CKD, resolved  -Crt 4.14 OA > 3.01  -Nephrology following     Hypokalemia, hypocalcemia, resolved  -recheck labs in 1 wk w/ PCP or Nephrology     HTN  -Coreg, hydralazine     Nocturnal hypoxia  -Patient reportedly with NICOLE though he has never had formal sleep study, recommend outpatient follow-up with sleep medicine    Day of Discharge     Physical Exam:  Temp:  [97.3 °F (36.3 °C)-97.9 °F (36.6 °C)] 97.9 °F (36.6 °C)  Heart Rate:  [58-83] 69  Resp:  [18-20] 18  BP: (113-159)/(71-85) 158/79  Body mass index is 32.55 kg/m².  Physical Exam  Pulmonary:      Effort: Pulmonary effort is normal. No respiratory distress.      Breath sounds: No stridor.   Abdominal:      Comments: Surgical sites clean dry intact, covered with " Dermabond   Musculoskeletal:      Right lower leg: No edema.      Left lower leg: No edema.   Skin:     Coloration: Skin is not pale.   Neurological:      Mental Status: He is alert and oriented to person, place, and time.   Consultants     Consult Orders (all) (From admission, onward)       Start     Ordered    08/31/24 0536  Inpatient Nephrology Consult  Once        Specialty:  Nephrology  Provider:  Mahogany Price MD    08/31/24 0535    08/30/24 1352  Gastroenterology (on-call MD unless specified)  Once        Specialty:  Gastroenterology  Provider:  (Not yet assigned)    08/30/24 1351    08/30/24 1352  Surgery (on-call MD unless specified)  Once        Specialty:  General Surgery  Provider:  Matt Michael MD    08/30/24 1352    08/30/24 1323  [facility] (on-call MD unless specified) Details  Once        Specialty:  Hospitalist  Provider:  Estefani Garcia MD    08/30/24 1323                  Procedures     CHOLECYSTECTOMY LAPAROSCOPIC WITH DAVINCI ROBOT WITH IOC      Imaging Results (All)       Procedure Component Value Units Date/Time    US Gallbladder [[id number]] Collected: 08/30/24 1552     Updated: 08/30/24 1600    Narrative:      US GALLBLADDER-     Date of Exam: 8/30/2024 3:22 PM     Indication: Right upper quadrant pain.  Signs of sludge or stones.     Comparison: CT 8/30/2024.     Technique: Multiplanar grayscale color flow imaging of the right upper  quadrant of the abdomen was performed.     FINDINGS:  The liver demonstrates normal echogenicity and echotexture. No focal  liver lesion is identified. The liver measures 20.5 cm in maximal  measured length.  No intrahepatic biliary duct dilatation. The portal  vein demonstrates normal hepatopedal flow.     Small echogenic 0.8 cm nodule along the wall of the nondependent lumen  of the neck of the gallbladder, possible small polyp or adherent stone.  Echogenic layering debris within the dependent lumen of the gallbladder,  which could  represent sludge or small stones. No gallbladder wall  thickening or pericholecystic fluid. The common duct measures 0.2 cm in  diameter. The ultrasound technologist reports a negative Woody's sign.     Visualized portions of the pancreatic head and body are within normal  limits.     Multiple simple appearing right renal cysts are seen, the largest  measuring 1.6 cm in diameter. The right kidney is otherwise unremarkable  in ultrasound appearance. No solid right renal mass, shadowing stone, or  hydronephrosis. The right kidney measures 11.6 cm in length.     Visualized portions of the abdominal aorta and IVC are unremarkable.     No free fluid is seen in the right upper quadrant. Partially imaged  right pleural effusion.       Impression:         1. Layering sludge and/or small stones within the gallbladder and a  small subcentimeter polyp or adherent stone in the neck of the  gallbladder, without specific ultrasound evidence of acute  cholecystitis. Correlate with laboratory values. If there is persistent  clinical concern, could consider correlating with nuclear medicine  hepatobiliary scan if clinically indicated.  2. Simple appearing right renal cysts.     This report was finalized on 8/30/2024 3:57 PM by Aditya Macias MD on  Workstation: [id number]       CT Abdomen Pelvis Without Contrast [[id number]] Collected: 08/30/24 1236     Updated: 08/30/24 1435    Narrative:      CT ABDOMEN PELVIS WO CONTRAST-     HISTORY: 69 years of age, Male.  Right upper quadrant abdominal pain  after eating with fevers.  Been occurring for 2 to 3 months.  Chronic  renal failure     TECHNIQUE:  CT includes axial imaging from the lung bases to the  trochanters without intravenous contrast and with use of oral contrast.  Data reconstructed in coronal and sagittal planes. Radiation dose  reduction techniques were utilized, including automated exposure control  and exposure modulation based on body size.     COMPARISON: None      FINDINGS: Moderate right and small left pleural effusions. Right pleural  fluid appears to partially extend into the major fissure.     Stomach appears thick-walled, though this is in part related to  decompression of the stomach. Dense material fills approximately half  the gallbladder lumen suggesting sludge or stones. No evidence for  gallbladder inflammation. Liver, spleen, right adrenal gland, pancreas  exhibit normal noncontrasted CT appearance. There is thickening of the  left renal gland associated with low-density most likely due to an  adrenal adenoma.     Bilateral renal atrophy and low-density lesions that are difficult to  characterize without contrast that are most likely cysts. There is also  increased density left anterior lateral lower pole renal lesion  measuring 1.6 cm that likely represents a hemorrhagic or proteinaceous  cyst, though is indeterminate on this exam.     No bowel dilatation or evidence of bowel obstruction. Normal appendix is  present. Mild median lobe hypertrophy of the prostate gland.     Atherosclerotic calcifications are present involving the abdominal aorta  and iliac vasculature. Within the central lower back subcutaneous fat  extending just to the left of midline there is a subcutaneous cystic  lesion measuring 4.3 x 2.7 x 3.5 cm, and this is most likely a sebaceous  cyst and has increased in size compared to prior CT lumbar spine  07/10/2018. There is advanced degenerative disc disease in the lumbar  spine, especially at L3-L4 where there is 8 mm degenerative  anterolisthesis of L3 with respect to L4. Chronic bilateral L3 pars  defects are present. There is prominent facet arthritis with narrowing  of the central canal at L3-L4 and L4-L5.        Impression:      1. Moderate right and small left pleural effusions. Right pleural fluid  extends partially into the right major fissure.  2. Stomach is decompressed though appears thick-walled and this may be  associated with  gastritis and could be correlated with clinical data.  Consideration could be given to further evaluation with EGD or upper GI.  3. Dense material within the gallbladder due to sludge or stones without  CT evidence to suggest cholecystitis.  4. Renal atrophy with multiple bilateral renal cysts, as well small  low-density lesions that are too small to characterize, and a left lower  pole hyperdense lesion that is likely a hemorrhagic or proteinaceous  cyst, though not definitively characterized.  5. Subcutaneous cystic lesion within the lower back fat consistent with  a sebaceous cyst and is mildly increased in size compared to prior CT  07/10/2018.  6. Chronic bilateral L3 pars interarticularis defects with  anterolisthesis of L3 with respect to L4 and advanced degenerative disc  disease.        Radiation dose reduction techniques were utilized, including automated  exposure control and exposure modulation based on body size.        This report was finalized on 8/30/2024 2:32 PM by Maurice Bryant M.D  on Workstation: [id number]                 Pertinent Labs     Results from last 7 days   Lab Units 09/02/24 0426 09/01/24 0517 08/31/24 0603 08/30/24  1020   WBC 10*3/mm3 6.05 6.46 5.19 6.79   HEMOGLOBIN g/dL 8.5* 8.2* 8.8* 9.9*   PLATELETS 10*3/mm3 150 156 183 142     Results from last 7 days   Lab Units 09/02/24 0426 09/01/24 0517 08/31/24  1455 08/31/24  0603   SODIUM mmol/L 136 135* 135* 139   POTASSIUM mmol/L 4.5 3.1* 3.9 3.4*   CHLORIDE mmol/L 102 101 97* 100   CO2 mmol/L 25.0 25.0 28.6 29.0   BUN mg/dL 28* 27* 31* 33*   CREATININE mg/dL 3.09* 3.01* 3.62* 3.39*   GLUCOSE mg/dL 101* 97 152* 100*   EGFR mL/min/1.73 21.0* 21.7* 17.4* 18.8*     Results from last 7 days   Lab Units 09/02/24 0426 09/01/24 0517 08/31/24  1455 08/31/24  0603 08/30/24  1020   ALBUMIN g/dL 2.9* 2.7* 3.0* 2.7* 3.3*   BILIRUBIN mg/dL 0.2 0.2  --  0.3 0.3   ALK PHOS U/L 45 37*  --  39 42   AST (SGOT) U/L 20 24  --  17 22   ALT  "(SGPT) U/L 7 7  --  6 9     Results from last 7 days   Lab Units 09/02/24  0426 09/01/24  0517 08/31/24  1455 08/31/24  0603 08/30/24  1020   CALCIUM mg/dL 7.4* 5.5* 6.0* 5.7* 6.1*   ALBUMIN g/dL 2.9* 2.7* 3.0* 2.7* 3.3*   MAGNESIUM mg/dL 1.5* 2.2 1.2*  --  0.8*   PHOSPHORUS mg/dL 2.0* 2.1* 2.6  --   --      Results from last 7 days   Lab Units 08/30/24  1020   LIPASE U/L 17       Results from last 7 days   Lab Units 09/02/24  0426 09/01/24  1458   CREATININE UR mg/dL  --  63.1   PROTEIN TOTAL URINE mg/dL  --  38.9   URIC ACID mg/dL 8.9*  --    PROT/CREAT RATIO UR mg/G Crea  --  616.5*         Invalid input(s): \"LDLCALC\"  Results from last 7 days   Lab Units 08/30/24  1051 08/30/24  1046   BLOODCX  No growth at 3 days No growth at 3 days         Test Results Pending at Discharge     Pending Labs       Order Current Status    Tissue Pathology Exam Collected (08/31/24 1104)    Blood Culture - Blood, Arm, Right Preliminary result    Blood Culture - Blood, Arm, Right Preliminary result            Discharge Details        Discharge Medications        New Medications        Instructions Start Date   calcium carbonate 500 MG chewable tablet  Commonly known as: Tums   2 tablets, Oral, Daily             Continue These Medications        Instructions Start Date   acetaminophen 500 MG tablet  Commonly known as: TYLENOL   1,000 mg, Oral, Every 6 Hours PRN      albuterol sulfate  (90 Base) MCG/ACT inhaler  Commonly known as: PROVENTIL HFA;VENTOLIN HFA;PROAIR HFA   2 puffs, Inhalation, Every 4 Hours PRN      carvedilol 12.5 MG tablet  Commonly known as: COREG   12.5 mg, Oral, 2 Times Daily With Meals      Chlorhexidine Gluconate Cloth 2 % pads   1 application , Apply externally, USE AS DIRECTED PREOP      fenofibrate micronized 134 MG capsule  Commonly known as: LOFIBRA   134 mg, Oral, Nightly      furosemide 80 MG tablet  Commonly known as: LASIX   80 mg, Oral, 2 Times Daily      gabapentin 300 MG capsule  Commonly known " as: NEURONTIN   300 mg, Oral, 2 Times Daily      hydrALAZINE 100 MG tablet  Commonly known as: APRESOLINE   100 mg, Oral, 2 Times Daily      HYDROcodone-acetaminophen 7.5-325 MG per tablet  Commonly known as: NORCO   1 tablet, Oral, Every 6 Hours PRN      loperamide 2 MG capsule  Commonly known as: IMODIUM   2 mg, Oral, Daily With Breakfast      magnesium oxide 400 tablet tablet  Commonly known as: MAG-OX   400 mg, Oral, 2 Times Daily      omeprazole 40 MG capsule  Commonly known as: priLOSEC   40 mg, Oral, Every Morning      ondansetron 8 MG tablet  Commonly known as: ZOFRAN   8 mg, Oral, Every 6 Hours PRN      simvastatin 20 MG tablet  Commonly known as: ZOCOR   20 mg, Oral, Nightly      vitamin D 1.25 MG (35520 UT) capsule capsule  Commonly known as: ERGOCALCIFEROL   50,000 Units, Oral, Weekly             Stop These Medications      ciprofloxacin 500 MG tablet  Commonly known as: CIPRO     doxazosin 8 MG tablet  Commonly known as: CARDURA     oxyCODONE 5 MG immediate release tablet  Commonly known as: Roxicodone     pioglitazone 30 MG tablet  Commonly known as: ACTOS     Torsemide 60 MG tablet              No Known Allergies    Discharge Disposition:  Home or Self Care      Discharge Diet:  Diet Order   Procedures    Diet: Diabetic; Consistent Carbohydrate; Fluid Consistency: Thin (IDDSI 0)       Discharge Activity:   Activity Instructions       Activity as Tolerated              CODE STATUS:    Code Status and Medical Interventions: CPR (Attempt to Resuscitate); Full Support   Ordered at: 08/30/24 1305     Code Status (Patient has no pulse and is not breathing):    CPR (Attempt to Resuscitate)     Medical Interventions (Patient has pulse or is breathing):    Full Support       No future appointments.   Follow-up Information       Matt Michale MD. Schedule an appointment as soon as possible for a visit in 2 week(s).    Specialty: General Surgery  Contact information:  [address]  [place] [id number]  [phone]               Mahogany Price MD Follow up in 1 week(s).    Specialty: Nephrology  Contact information:  [address]  [address]  [place] [id number] [phone]               Landon Walker MD .    Specialty: Family Medicine  Contact information:  [address]  [address]  [facility] [id number] [phone]                             Time Spent on Discharge:  Greater than 30 minutes spent on discharge management including final examination, discussion of hospital stay and patient education, preparation of records, medication reconciliation, follow up planning      Terry Hurst MD  [facility]  09/02/24  09:11 EDT

## 2024-09-02 NOTE — PROGRESS NOTES
[facility] Progress Note      Patient Name: Ric Roy  : 1955  MRN: [id number]  Primary Care Physician:  Landon Walker MD  Date of admission: 2024    Subjective     Interval History:   Follow-up chronic kidney disease    No shortness of breath or chest pain  Appetite is good; no N/V  No abdominal pain  Eager to go home    Review of Systems:   As noted above    Objective     Vitals:   Temp:  [97.3 °F (36.3 °C)-97.9 °F (36.6 °C)] 97.9 °F (36.6 °C)  Heart Rate:  [58-83] 69  Resp:  [18-20] 18  BP: (113-159)/(71-85) 158/79    Intake/Output Summary (Last 24 hours) at 2024 1131  Last data filed at 2024 0909  Gross per 24 hour   Intake 240 ml   Output 600 ml   Net -360 ml       Physical Exam:    General Appearance: alert, awake, NAD, overweight  Skin: warm and dry  HEENT: oral mucosa normal, nonicteric sclera  Neck: supple, no JVD  Lungs: CTA, unlabored breathing effort  Heart: RRR, no rub  Abdomen: soft, nontender, nondistended, BS +  : no palpable bladder  Extremities: 1+ lower extremity edema  Neuro: normal speech and mental status     Scheduled Meds:     atorvastatin, 10 mg, Oral, Daily  carvedilol, 12.5 mg, Oral, BID With Meals  gabapentin, 300 mg, Oral, BID  hydrALAZINE, 100 mg, Oral, BID  insulin regular, 2-7 Units, Subcutaneous, Q6H  magnesium oxide, 400 mg, Oral, BID  magnesium sulfate, 1 g, Intravenous, Q1H  pantoprazole, 40 mg, Oral, Q AM  sodium chloride, 10 mL, Intravenous, Q12H  vitamin D, 50,000 Units, Oral, Weekly      IV Meds:   sodium chloride, 9 mL/hr, Last Rate: 9 mL/hr (24 1025)        Results Reviewed:   I have personally reviewed the results from the time of this admission to 2024 11:31 EDT     Results from last 7 days   Lab Units 24  0426 24  0517 24  1455 24  0603   SODIUM mmol/L 136 135* 135* 139   POTASSIUM mmol/L 4.5 3.1* 3.9 3.4*   CHLORIDE mmol/L 102 101 97* 100   CO2 mmol/L 25.0 25.0 28.6 29.0    BUN mg/dL 28* 27* 31* 33*   CREATININE mg/dL 3.09* 3.01* 3.62* 3.39*   CALCIUM mg/dL 7.4* 5.5* 6.0* 5.7*   BILIRUBIN mg/dL 0.2 0.2  --  0.3   ALK PHOS U/L 45 37*  --  39   ALT (SGPT) U/L 7 7  --  6   AST (SGOT) U/L 20 24  --  17   GLUCOSE mg/dL 101* 97 152* 100*       Estimated Creatinine Clearance: 28.8 mL/min (A) (by C-G formula based on SCr of 3.09 mg/dL (H)).    Results from last 7 days   Lab Units 09/02/24  0426 09/01/24  0517 08/31/24  1455   MAGNESIUM mg/dL 1.5* 2.2 1.2*   PHOSPHORUS mg/dL 2.0* 2.1* 2.6       Results from last 7 days   Lab Units 09/02/24  0426 09/01/24  0517   URIC ACID mg/dL 8.9* 9.3*       Results from last 7 days   Lab Units 09/02/24  0426 09/01/24  0517 08/31/24  0603 08/30/24  1020   WBC 10*3/mm3 6.05 6.46 5.19 6.79   HEMOGLOBIN g/dL 8.5* 8.2* 8.8* 9.9*   PLATELETS 10*3/mm3 150 156 183 142       Results from last 7 days   Lab Units 08/30/24  1020   INR  1.24*       Assessment / Plan     ASSESSMENT:  CKD 4, with poor but stable function.  Peripheral edema, but lungs are clear by exam.  Electrolytes stable other than low phosphorus and low magnesium after several weeks of poor appetite with N/V  Acute cholecystitis, s/p laparoscopic cholecystectomy on 8/31  Weight loss of 20-25 pounds over the last few months  DM2   Hypertension  Anemia of chronic kidney disease, stable    PLAN:  Magnesium replacement underway  Return of appetite will address low phosphorus  Resume furosemide at home dose of 80 mg twice daily once he is eating and drinking normally (tomorrow should be fine)  Home anytime from [facility].  He already has an appointment in our office in roughly 2 weeks      Discussed the case with the patient and he voiced good understanding.    Thank you for involving us in the care of Ric Roy.  Please feel free to call with any questions.    Guzman Hansen MD  09/02/24  11:31 EDT    [facility]  [phone]    Please note that portions of this  note were completed with a voice recognition program.

## 2024-09-02 NOTE — CASE MANAGEMENT/SOCIAL WORK
Case Management Discharge Note      Final Note: Pt discharged home. MARIA DOLORES Mojica RN         Selected Continued Care - Discharged on 9/2/2024 Admission date: 8/30/2024 - Discharge disposition: Home or Self Care      Destination    No services have been selected for the patient.                Durable Medical Equipment    No services have been selected for the patient.                Dialysis/Infusion    No services have been selected for the patient.                Home Medical Care    No services have been selected for the patient.                Therapy    No services have been selected for the patient.                Community Resources    No services have been selected for the patient.                Community & DME    No services have been selected for the patient.                    Transportation Services  Private: Car    Final Discharge Disposition Code: 01 - home or self-care

## 2024-09-03 ENCOUNTER — TELEPHONE (OUTPATIENT)
Dept: SURGERY | Facility: CLINIC | Age: 69
End: 2024-09-03
Payer: MEDICARE

## 2024-09-03 NOTE — OUTREACH NOTE
Prep Survey      Flowsheet Row Responses   [facility] facility patient discharged from? [place]   Is LACE score < 7 ? No   Eligibility Readm Mgmt   Discharge diagnosis Abdominal pain, lap lucia   Does the patient have one of the following disease processes/diagnoses(primary or secondary)? General Surgery   Does the patient have Home health ordered? No   Is there a DME ordered? No   Prep survey completed? Yes            Mikala BLANCAS - Registered Nurse

## 2024-09-04 LAB
BACTERIA SPEC AEROBE CULT: NORMAL
BACTERIA SPEC AEROBE CULT: NORMAL
CYTO UR: NORMAL
LAB AP CASE REPORT: NORMAL
PATH REPORT.FINAL DX SPEC: NORMAL
PATH REPORT.GROSS SPEC: NORMAL

## 2024-09-05 ENCOUNTER — READMISSION MANAGEMENT (OUTPATIENT)
Dept: CALL CENTER | Facility: HOSPITAL | Age: 69
End: 2024-09-05
Payer: MEDICARE

## 2024-09-05 NOTE — OUTREACH NOTE
General Surgery Week 1 Survey      Flowsheet Row Responses   [facility] patient discharged from? [place]   Does the patient have one of the following disease processes/diagnoses(primary or secondary)? General Surgery   Week 1 attempt successful? Yes   Call start time 1007   Call end time 1011   Discharge diagnosis Abdominal pain, lap lucia   Meds reviewed with patient/caregiver? Yes   Is the patient having any side effects they believe may be caused by any medication additions or changes? No   Does the patient have all medications related to this admission filled (includes all antibiotics, pain medications, etc.) Yes   Is the patient taking all medications as directed (includes completed medication regime)? Yes   Does the patient have a follow up appointment scheduled with their surgeon? Yes  [9/12/24]   Has the patient kept scheduled appointments due by today? N/A   Has home health visited the patient within 72 hours of discharge? N/A   Psychosocial issues? No   Did the patient receive a copy of their discharge instructions? Yes   Nursing interventions Reviewed instructions with patient   What is the patient's perception of their health status since discharge? Improving   Nursing interventions Nurse provided patient education   Is the patient /caregiver able to teach back basic post-op care? Take showers only when approved by MD-sponge bathe until then, No tub bath, swimming, or hot tub until instructed by MD, Keep incision areas clean,dry and protected, Lifting as instructed by MD in discharge instructions   Is the patient/caregiver able to teach back signs and symptoms of incisional infection? Increased drainage or bleeding, Increased redness, swelling or pain at the incisonal site   Is the patient/caregiver able to teach back steps to recovery at home? Set small, achievable goals for return to baseline health   Is the patient/caregiver able to teach back the hierarchy of who to call/visit for  symptoms/problems? PCP, Specialist, Home health nurse, Urgent Care, ED, 911 Yes   Week 1 call completed? Yes   Graduated Yes   Is the patient interested in additional calls from an ambulatory ? No   Would this patient benefit from a Referral to [facility] Social Work? No   Wrap up additional comments Pt reports doing well, incisions healing w/o s/sx of infection, no concerns at this time   Call end time 1011            Michela BLANCAS - Registered Nurse

## 2024-09-10 PROBLEM — K80.10 CALCULUS OF GALLBLADDER WITH CHRONIC CHOLECYSTITIS WITHOUT OBSTRUCTION: Status: ACTIVE | Noted: 2024-08-30

## 2024-09-11 NOTE — PROGRESS NOTES
Enter Query Response Below      Query Response: Calculus of gallbladder with chronic cholecystitis without obstruction              If applicable, please update the problem list.

## 2024-09-12 ENCOUNTER — OFFICE VISIT (OUTPATIENT)
Dept: SURGERY | Facility: CLINIC | Age: 69
End: 2024-09-12
Payer: MEDICARE

## 2024-09-12 VITALS
DIASTOLIC BLOOD PRESSURE: 68 MMHG | SYSTOLIC BLOOD PRESSURE: 144 MMHG | HEIGHT: 72 IN | WEIGHT: 239 LBS | BODY MASS INDEX: 32.37 KG/M2

## 2024-09-12 DIAGNOSIS — Z51.89 VISIT FOR WOUND CHECK: Primary | ICD-10-CM

## 2024-09-12 PROBLEM — E66.9 OBESITY (BMI 30.0-34.9): Status: ACTIVE | Noted: 2024-09-12

## 2024-09-12 PROBLEM — E66.811 OBESITY (BMI 30.0-34.9): Status: ACTIVE | Noted: 2024-09-12

## 2024-09-12 PROCEDURE — 99024 POSTOP FOLLOW-UP VISIT: CPT | Performed by: SURGERY

## 2024-09-12 PROCEDURE — 3077F SYST BP >= 140 MM HG: CPT | Performed by: SURGERY

## 2024-09-12 PROCEDURE — 1159F MED LIST DOCD IN RCRD: CPT | Performed by: SURGERY

## 2024-09-12 PROCEDURE — 3078F DIAST BP <80 MM HG: CPT | Performed by: SURGERY

## 2024-09-12 PROCEDURE — 1160F RVW MEDS BY RX/DR IN RCRD: CPT | Performed by: SURGERY

## 2024-09-12 NOTE — PROGRESS NOTES
"CC: Postop check    S: This is a 69 y.o. male who presents for a post-operative visit after undergoing a robotic assisted laparoscopic Cholecystectomy on 8 31st 2024.     Patient reports he is doing well. Eating well without any significant nausea. Having good bowel function. No problems with constipation or diarrhea. No urinary complaints. Denies fever. Ambulating well and slowly returning to normal activities.    Pathology report:    Final Diagnosis   1.  Gallbladder, cholecystectomy: Benign gallbladder with               -A.  Chronic cholecystitis               -B.  Single benign lymph node       O:  Vitals:    09/12/24 0903   BP: 144/68   BP Location: Right arm   Patient Position: Sitting   Weight: 108 kg (239 lb)   Height: 182.9 cm (72.01\")      Alert oriented x 3, no acute distress  Breathing comfortable  soft, nontender, nondistended, no masses or organomegaly  Incisions are healing well without any erythema or signs of infection. No signs of hernia.   BMI is >= 30 and <35. (Class 1 Obesity). The following options were offered after discussion;: weight loss educational material (shared in after visit summary)      Assessment and plan:     The patient is a very pleasant 69 y.o. male s/p robotic assisted laparoscopic cholecystectomy.  Patient is doing fine and does not have any specific complaints other than mild incisional pain.     I advised the patient to continue to refrain from doing any heavy lifting of more than 15 pounds for a total of 6 weeks.  Patient may start doing an aerobic type exercise in 4 weeks after surgery.    Patient was given time to ask questions and all of them were answered appropriately.  The patient verbalized understanding and agreed with the plan.    he will follow-up at our office on a prn basis unless there are any problems.   "

## 2024-10-01 ENCOUNTER — APPOINTMENT (OUTPATIENT)
Dept: CARDIOLOGY | Facility: HOSPITAL | Age: 69
End: 2024-10-01
Payer: MEDICARE

## 2024-10-01 ENCOUNTER — HOSPITAL ENCOUNTER (EMERGENCY)
Facility: HOSPITAL | Age: 69
Discharge: HOME | End: 2024-10-01
Attending: STUDENT IN AN ORGANIZED HEALTH CARE EDUCATION/TRAINING PROGRAM
Payer: MEDICARE

## 2024-10-01 VITALS
HEART RATE: 78 BPM | SYSTOLIC BLOOD PRESSURE: 193 MMHG | DIASTOLIC BLOOD PRESSURE: 85 MMHG | WEIGHT: 240 LBS | OXYGEN SATURATION: 100 % | TEMPERATURE: 97.9 F | HEIGHT: 72 IN | BODY MASS INDEX: 32.51 KG/M2 | RESPIRATION RATE: 18 BRPM

## 2024-10-01 DIAGNOSIS — R60.9 EDEMA, UNSPECIFIED TYPE: Primary | ICD-10-CM

## 2024-10-01 LAB
ALBUMIN SERPL-MCNC: 3.1 G/DL (ref 3.5–5.2)
ALBUMIN/GLOB SERPL: 0.8 G/DL
ALP SERPL-CCNC: 54 U/L (ref 39–117)
ALT SERPL W P-5'-P-CCNC: 7 U/L (ref 1–41)
ANION GAP SERPL CALCULATED.3IONS-SCNC: 12 MMOL/L (ref 5–15)
AST SERPL-CCNC: 19 U/L (ref 1–40)
BASOPHILS # BLD AUTO: 0.07 10*3/MM3 (ref 0–0.2)
BASOPHILS NFR BLD AUTO: 0.7 % (ref 0–1.5)
BH CV UPPER VENOUS LEFT INTERNAL JUGULAR COMPRESS: NORMAL
BH CV UPPER VENOUS LEFT INTERNAL JUGULAR PHASIC: NORMAL
BH CV UPPER VENOUS LEFT INTERNAL JUGULAR SPONT: NORMAL
BH CV UPPER VENOUS LEFT SUBCLAVIAN AUGMENT: NORMAL
BH CV UPPER VENOUS LEFT SUBCLAVIAN COMPRESS: NORMAL
BH CV UPPER VENOUS LEFT SUBCLAVIAN PHASIC: NORMAL
BH CV UPPER VENOUS LEFT SUBCLAVIAN SPONT: NORMAL
BH CV UPPER VENOUS RIGHT AXILLARY AUGMENT: NORMAL
BH CV UPPER VENOUS RIGHT AXILLARY COMPRESS: NORMAL
BH CV UPPER VENOUS RIGHT AXILLARY PHASIC: NORMAL
BH CV UPPER VENOUS RIGHT AXILLARY SPONT: NORMAL
BH CV UPPER VENOUS RIGHT BASILIC FOREARM COMPRESS: NORMAL
BH CV UPPER VENOUS RIGHT BASILIC UPPER COMPRESS: NORMAL
BH CV UPPER VENOUS RIGHT BRACHIAL COMPRESS: NORMAL
BH CV UPPER VENOUS RIGHT CEPHALIC FOREARM COMPRESS: NORMAL
BH CV UPPER VENOUS RIGHT CEPHALIC UPPER COMPRESS: NORMAL
BH CV UPPER VENOUS RIGHT INTERNAL JUGULAR COMPRESS: NORMAL
BH CV UPPER VENOUS RIGHT INTERNAL JUGULAR PHASIC: NORMAL
BH CV UPPER VENOUS RIGHT INTERNAL JUGULAR SPONT: NORMAL
BH CV UPPER VENOUS RIGHT RADIAL COMPRESS: NORMAL
BH CV UPPER VENOUS RIGHT SUBCLAVIAN AUGMENT: NORMAL
BH CV UPPER VENOUS RIGHT SUBCLAVIAN COMPRESS: NORMAL
BH CV UPPER VENOUS RIGHT SUBCLAVIAN PHASIC: NORMAL
BH CV UPPER VENOUS RIGHT SUBCLAVIAN SPONT: NORMAL
BH CV UPPER VENOUS RIGHT ULNAR COMPRESS: NORMAL
BH CV VAS PRELIMINARY FINDINGS SCRIPTING: 1
BILIRUB SERPL-MCNC: 0.2 MG/DL (ref 0–1.2)
BUN SERPL-MCNC: 47 MG/DL (ref 8–23)
BUN/CREAT SERPL: 15.7 (ref 7–25)
CALCIUM SPEC-SCNC: 8.3 MG/DL (ref 8.6–10.5)
CHLORIDE SERPL-SCNC: 102 MMOL/L (ref 98–107)
CO2 SERPL-SCNC: 24 MMOL/L (ref 22–29)
CREAT SERPL-MCNC: 2.99 MG/DL (ref 0.76–1.27)
DEPRECATED RDW RBC AUTO: 44 FL (ref 37–54)
EGFRCR SERPLBLD CKD-EPI 2021: 21.9 ML/MIN/1.73
EOSINOPHIL # BLD AUTO: 0.21 10*3/MM3 (ref 0–0.4)
EOSINOPHIL NFR BLD AUTO: 2.2 % (ref 0.3–6.2)
ERYTHROCYTE [DISTWIDTH] IN BLOOD BY AUTOMATED COUNT: 13.4 % (ref 12.3–15.4)
GLOBULIN UR ELPH-MCNC: 3.8 GM/DL
GLUCOSE SERPL-MCNC: 89 MG/DL (ref 65–99)
HCT VFR BLD AUTO: 30 % (ref 37.5–51)
HGB BLD-MCNC: 9.2 G/DL (ref 13–17.7)
HOLD SPECIMEN: NORMAL
IMM GRANULOCYTES # BLD AUTO: 0.08 10*3/MM3 (ref 0–0.05)
IMM GRANULOCYTES NFR BLD AUTO: 0.9 % (ref 0–0.5)
LYMPHOCYTES # BLD AUTO: 1.07 10*3/MM3 (ref 0.7–3.1)
LYMPHOCYTES NFR BLD AUTO: 11.4 % (ref 19.6–45.3)
MCH RBC QN AUTO: 27.5 PG (ref 26.6–33)
MCHC RBC AUTO-ENTMCNC: 30.7 G/DL (ref 31.5–35.7)
MCV RBC AUTO: 89.8 FL (ref 79–97)
MONOCYTES # BLD AUTO: 0.55 10*3/MM3 (ref 0.1–0.9)
MONOCYTES NFR BLD AUTO: 5.9 % (ref 5–12)
NEUTROPHILS NFR BLD AUTO: 7.38 10*3/MM3 (ref 1.7–7)
NEUTROPHILS NFR BLD AUTO: 78.9 % (ref 42.7–76)
PLATELET # BLD AUTO: 284 10*3/MM3 (ref 140–450)
PMV BLD AUTO: 10.1 FL (ref 6–12)
POTASSIUM SERPL-SCNC: 4.3 MMOL/L (ref 3.5–5.2)
PROT SERPL-MCNC: 6.9 G/DL (ref 6–8.5)
RBC # BLD AUTO: 3.34 10*6/MM3 (ref 4.14–5.8)
SODIUM SERPL-SCNC: 138 MMOL/L (ref 136–145)
WBC NRBC COR # BLD AUTO: 9.36 10*3/MM3 (ref 3.4–10.8)
WHOLE BLOOD HOLD SPECIMEN: NORMAL

## 2024-10-01 PROCEDURE — 93971 EXTREMITY STUDY: CPT

## 2024-10-01 PROCEDURE — 36415 COLL VENOUS BLD VENIPUNCTURE: CPT

## 2024-10-01 PROCEDURE — 93971 EXTREMITY STUDY: CPT | Performed by: SURGERY

## 2024-10-01 PROCEDURE — 99284 EMERGENCY DEPT VISIT MOD MDM: CPT

## 2024-10-01 PROCEDURE — 85025 COMPLETE CBC W/AUTO DIFF WBC: CPT | Performed by: EMERGENCY MEDICINE

## 2024-10-01 PROCEDURE — 80053 COMPREHEN METABOLIC PANEL: CPT | Performed by: EMERGENCY MEDICINE

## 2024-10-01 NOTE — ED PROVIDER NOTES
EMERGENCY DEPARTMENT ENCOUNTER  Room Number:  HB1/C  PCP: Landon Walker MD  Independent Historians: Patient      HPI:  Chief Complaint: had concerns including Edema.       Context: Ric Roy is a 69 y.o. male with a medical history of CKD, anemia, diabetes, HTN, COPD who presents to the ED c/o acute swelling and tenderness in the right upper extremity.  Patient first noticed symptoms 3 days ago with swelling in his hand.  This is gradually increased to include most of his right upper extremity.  Patient notes tenderness and ropelike area in his right axilla and along the right neck.  Patient denies chest pain and shortness of breath.  Patient had cholecystectomy approximately 3 weeks ago.  Patient is not on any blood thinners.      Review of prior external notes (non-ED) -and- Review of prior external test results outside of this encounter: Discharge summary from 9/2/2024 reviewed and notable for admission secondary to swelling and edema.  Patient found to have colitis and was started on Cipro and Flagyl.  Patient was found to have cholelithiasis with elevated LFTs.  Patient was seen by nephrology and general surgery.  Patient's EDDIE resolved.  Patient had significant electrolyte derangements that improved.  Patient discharged on Lasix 80 mg twice daily.    Prescription drug monitoring program review:         PAST MEDICAL HISTORY  Active Ambulatory Problems     Diagnosis Date Noted    Cervical spinal stenosis 07/05/2018    Nausea & vomiting 07/27/2018    Hypertension 07/27/2018    Diabetes mellitus 07/27/2018    Stage 3 chronic kidney disease 07/27/2018    Abdominal pain 08/30/2024    Chronic kidney disease 08/30/2024    EDDIE (acute kidney injury) 08/30/2024    Anemia 08/30/2024    Chronic diarrhea 08/30/2024    Hypomagnesemia 08/30/2024    Calculus of gallbladder with chronic cholecystitis without obstruction 08/30/2024    Obesity (BMI 30.0-34.9) 09/12/2024     Resolved Ambulatory Problems     Diagnosis  Date Noted    Symptomatic cholelithiasis 08/30/2024     Past Medical History:   Diagnosis Date    Arthritis     Asthma     At risk for sleep apnea     Back pain     Cholelithiasis [id number]    Cluster headaches     COPD (chronic obstructive pulmonary disease)     COVID 2022    Fissure, anal     Foot drop, right     GERD (gastroesophageal reflux disease)     History of pancreatitis     Hyperlipidemia     Kidney failure 2023    Microscopic colitis, unspecified microscopic colitis type     Pancreatitis [id number]    Skin abrasion          PAST SURGICAL HISTORY  Past Surgical History:   Procedure Laterality Date    ANTERIOR CERVICAL DISCECTOMY W/ FUSION N/A 07/27/2018    Procedure: C4 to C7 anterior cervical discectomy, fusion and instrumentation;  Surgeon: Lavon Rincon MD;  Location: [facility] MAIN OR;  Service: Neurosurgery    ARTERIOVENOUS FISTULA/SHUNT SURGERY Left 7/20/2023    Procedure: LEFT ARM ARTERIOVENOUS FISTULA;  Surgeon: Gus Chong II, MD;  Location: [facility] MAIN OR;  Service: Vascular;  Laterality: Left;    BACK SURGERY      lumbar x2    CHOLECYSTECTOMY N/A 8/31/2024    Procedure: CHOLECYSTECTOMY LAPAROSCOPIC WITH DAVINCI ROBOT WITH IOC;  Surgeon: Matt Michael MD;  Location: [facility] MAIN OR;  Service: General;  Laterality: N/A;    COLONOSCOPY      COLONOSCOPY W/ BIOPSIES      PILONIDAL CYST DRAINAGE      TONSILLECTOMY           FAMILY HISTORY  Family History   Problem Relation Age of Onset    Obesity Other     Malig Hyperthermia Neg Hx          SOCIAL HISTORY  Social History     Socioeconomic History    Marital status:     Number of children: 0    Years of education: 12   Tobacco Use    Smoking status: Every Day     Current packs/day: 1.00     Average packs/day: 1 pack/day for 50.0 years (50.0 ttl pk-yrs)     Types: Cigarettes    Smokeless tobacco: Never    Tobacco comments:     Last cigarette 7/26/18 2130   Vaping Use    Vaping status: Never Used   Substance and Sexual Activity     Alcohol use: Not Currently    Drug use: Yes     Frequency: 2.0 times per week     Types: Marijuana    Sexual activity: Defer         ALLERGIES  Patient has no known allergies.      REVIEW OF SYSTEMS  Review of Systems  Included in HPI  All systems reviewed and negative except for those discussed in HPI.      PHYSICAL EXAM    I have reviewed the triage vital signs and nursing notes.    ED Triage Vitals   Temp Heart Rate Resp BP SpO2   10/01/24 1404 10/01/24 1404 10/01/24 1404 10/01/24 1408 10/01/24 1404   97.9 °F (36.6 °C) 88 16 168/73 94 %      Temp src Heart Rate Source Patient Position BP Location FiO2 (%)   10/01/24 1404 10/01/24 1404 -- -- --   Tympanic Monitor          Physical Exam  GENERAL: alert, no acute distress  SKIN: Warm, dry  HENT: Normocephalic, atraumatic  EYES: no scleral icterus  CV: regular rhythm, regular rate  RESPIRATORY: normal effort, lungs clear  ABDOMEN: soft, nontender, nondistended  MUSCULOSKELETAL: no deformity.  Ropelike area of tenderness in the lateral chest wall of the right axilla as well as along the right anterior lateral neck.  Moderate swelling appreciated in the right hand.  NEURO: alert, moves all extremities, follows commands            LAB RESULTS  Recent Results (from the past 24 hour(s))   Duplex Venous Upper Extremity RIGHT    Collection Time: 10/01/24  3:24 PM   Result Value Ref Range    Right Internal Jugular Spont Y     Right Internal Jugular Phasic Y     Right Internal Jugular Compress C     Right Subclavian Spont Y     Right Subclavian Phasic Y     Right Subclavian Compress C     Right Subclavian Augment Y     Right Axillary Spont Y     Right Axillary Phasic Y     Right Axillary Compress C     Right Axillary Augment Y     Right Brachial Compress C     Right Radial Compress C     Right Ulnar Compress C     Right Basilic Upper Compress C     Right Basilic Forearm Compress C     Right Cephalic Upper Compress C     Right Cephalic Forearm Compress C     Left Internal  Jugular Spont Y     Left Internal Jugular Phasic Y     Left Internal Jugular Compress C     Left Subclavian Spont Y     Left Subclavian Phasic Y     Left Subclavian Compress C     Left Subclavian Augment Y     BH CV VAS PRELIMINARY FINDINGS SCRIPTING 1.0    Green Top (Gel)    Collection Time: 10/01/24  4:45 PM   Result Value Ref Range    Extra Tube Hold for add-ons.    Lavender Top    Collection Time: 10/01/24  4:45 PM   Result Value Ref Range    Extra Tube hold for add-on    CBC Auto Differential    Collection Time: 10/01/24  4:45 PM    Specimen: Hand, Right; Blood   Result Value Ref Range    WBC 9.36 3.40 - 10.80 10*3/mm3    RBC 3.34 (L) 4.14 - 5.80 10*6/mm3    Hemoglobin 9.2 (L) 13.0 - 17.7 g/dL    Hematocrit 30.0 (L) 37.5 - 51.0 %    MCV 89.8 79.0 - 97.0 fL    MCH 27.5 26.6 - 33.0 pg    MCHC 30.7 (L) 31.5 - 35.7 g/dL    RDW 13.4 12.3 - 15.4 %    RDW-SD 44.0 37.0 - 54.0 fl    MPV 10.1 6.0 - 12.0 fL    Platelets 284 140 - 450 10*3/mm3    Neutrophil % 78.9 (H) 42.7 - 76.0 %    Lymphocyte % 11.4 (L) 19.6 - 45.3 %    Monocyte % 5.9 5.0 - 12.0 %    Eosinophil % 2.2 0.3 - 6.2 %    Basophil % 0.7 0.0 - 1.5 %    Immature Grans % 0.9 (H) 0.0 - 0.5 %    Neutrophils, Absolute 7.38 (H) 1.70 - 7.00 10*3/mm3    Lymphocytes, Absolute 1.07 0.70 - 3.10 10*3/mm3    Monocytes, Absolute 0.55 0.10 - 0.90 10*3/mm3    Eosinophils, Absolute 0.21 0.00 - 0.40 10*3/mm3    Basophils, Absolute 0.07 0.00 - 0.20 10*3/mm3    Immature Grans, Absolute 0.08 (H) 0.00 - 0.05 10*3/mm3         RADIOLOGY  Duplex Venous Upper Extremity RIGHT    Result Date: 10/1/2024    Normal right upper extremity venous duplex scan.  Incidental note of small nonvascularized structure 1.7 x 0.4 cm in the right axilla.        MEDICATIONS GIVEN IN ER  Medications - No data to display      ORDERS PLACED DURING THIS VISIT:  Orders Placed This Encounter   Procedures    [place] Draw    Comprehensive Metabolic Panel    Protime-INR    CBC Auto Differential    NPO Diet NPO  Type: Strict NPO    Undress & Gown    CBC & Differential    Green Top (Gel)    Lavender Top    Gold Top - SST    Light Blue Top         OUTPATIENT MEDICATION MANAGEMENT:  No current Epic-ordered facility-administered medications on file.     Current Outpatient Medications Ordered in Epic   Medication Sig Dispense Refill    acetaminophen (TYLENOL) 500 MG tablet Take 2 tablets by mouth Every 6 (Six) Hours As Needed for Mild Pain.      albuterol sulfate  (90 Base) MCG/ACT inhaler Inhale 2 puffs Every 4 (Four) Hours As Needed for Wheezing.      calcium carbonate (Tums) 500 MG chewable tablet Chew 2 tablets Daily. 30 tablet 0    carvedilol (COREG) 12.5 MG tablet Take 1 tablet by mouth 2 (Two) Times a Day With Meals.      furosemide (LASIX) 80 MG tablet Take 1 tablet by mouth 2 (Two) Times a Day.      gabapentin (NEURONTIN) 300 MG capsule Take 1 capsule by mouth 2 (Two) Times a Day.      hydrALAZINE (APRESOLINE) 100 MG tablet Take 1 tablet by mouth 2 (Two) Times a Day. Taking 50 MG 3 times a day      HYDROcodone-acetaminophen (NORCO) 7.5-325 MG per tablet Take 1 tablet by mouth Every 6 (Six) Hours As Needed for Moderate Pain.  0    loperamide (IMODIUM) 2 MG capsule Take 1 capsule by mouth Daily With Breakfast.      magnesium oxide (MAG-OX) 400 tablet tablet Take 1 tablet by mouth 2 (Two) Times a Day.      omeprazole (priLOSEC) 40 MG capsule Take 1 capsule by mouth Every Morning.      simvastatin (ZOCOR) 20 MG tablet Take 1 tablet by mouth Every Night.      vitamin D (ERGOCALCIFEROL) 1.25 MG (53044 UT) capsule capsule Take 1 capsule by mouth 1 (One) Time Per Week.           PROCEDURES  Procedures            PROGRESS, DATA ANALYSIS, CONSULTS, AND MEDICAL DECISION MAKING  All labs have been independently interpreted by me.  All radiology studies have been reviewed by me. All EKG's have been independently viewed and interpreted by me.  Discussion below represents my analysis of pertinent findings related to patient's  condition, differential diagnosis, treatment plan and final disposition.    Differential diagnosis includes but is not limited to DVT, SVT, lymphadenopathy.    Clinical Scores:                   ED Course as of 10/01/24 1755   Tue Oct 01, 2024   1754 Skin ultrasound is negative for DVT.  Cystic structure noted.  Patient's hemoglobin is noted to be 9.2 which is improved from postoperative state.  Unclear etiology of patient's swelling.  Will have patient follow closely with primary care for monitoring and further testing.  Patient is agreeable with this plan.  Strict return precautions given. [MW]      ED Course User Index  [MW] Zana Craig MD             AS OF 17:55 EDT VITALS:    BP - 168/73  HR - 88  TEMP - 97.9 °F (36.6 °C) (Tympanic)  O2 SATS - 94%    COMPLEXITY OF CARE  Admission was considered but after careful review of the patient's presentation, physical examination, diagnostic results, and response to treatment the patient may be safely discharged with outpatient follow-up.      DIAGNOSIS  Final diagnoses:   Edema, unspecified type         DISPOSITION  ED Disposition       ED Disposition   Discharge    Condition   Stable    Comment   --                Please note that portions of this document were completed with a voice recognition program.    Note Disclaimer: At [facility], we believe that sharing information builds trust and better relationships. You are receiving this note because you recently visited [facility]. It is possible you will see health information before a provider has talked with you about it. This kind of information can be easy to misunderstand. To help you fully understand what it means for your health, we urge you to discuss this note with your provider.         Zana Craig MD  10/01/24 3325

## 2024-10-01 NOTE — ED TRIAGE NOTES
Patient to ED via pv form home. Patient c/o right sided neck, axillary and hand swelling that he noticed today.

## 2024-10-01 NOTE — DISCHARGE INSTRUCTIONS
Please follow-up with your primary care physician within 1 week for repeat assessment    Please return to the ER with new or worsening symptoms including but not limited to uncontrolled pain, fever, chill, severe worsening of swelling, chest pain or shortness of breath

## 2025-04-08 ENCOUNTER — HOSPITAL ENCOUNTER (EMERGENCY)
Facility: HOSPITAL | Age: 70
Discharge: HOME | End: 2025-04-09
Attending: EMERGENCY MEDICINE
Payer: MEDICARE

## 2025-04-08 DIAGNOSIS — N18.4 CHRONIC RENAL FAILURE, STAGE 4 (SEVERE): ICD-10-CM

## 2025-04-08 DIAGNOSIS — R52 GENERALIZED PAIN: Primary | ICD-10-CM

## 2025-04-08 LAB
BASOPHILS # BLD AUTO: 0.02 10*3/MM3 (ref 0–0.2)
BASOPHILS NFR BLD AUTO: 0.2 % (ref 0–1.5)
DEPRECATED RDW RBC AUTO: 39.5 FL (ref 37–54)
EOSINOPHIL # BLD AUTO: 0.21 10*3/MM3 (ref 0–0.4)
EOSINOPHIL NFR BLD AUTO: 2.5 % (ref 0.3–6.2)
ERYTHROCYTE [DISTWIDTH] IN BLOOD BY AUTOMATED COUNT: 13.7 % (ref 12.3–15.4)
ERYTHROCYTE [SEDIMENTATION RATE] IN BLOOD: 65 MM/HR (ref 0–20)
HCT VFR BLD AUTO: 33.1 % (ref 37.5–51)
HGB BLD-MCNC: 10.8 G/DL (ref 13–17.7)
HOLD SPECIMEN: NORMAL
IMM GRANULOCYTES # BLD AUTO: 0.03 10*3/MM3 (ref 0–0.05)
IMM GRANULOCYTES NFR BLD AUTO: 0.4 % (ref 0–0.5)
LYMPHOCYTES # BLD AUTO: 0.71 10*3/MM3 (ref 0.7–3.1)
LYMPHOCYTES NFR BLD AUTO: 8.4 % (ref 19.6–45.3)
MCH RBC QN AUTO: 26.4 PG (ref 26.6–33)
MCHC RBC AUTO-ENTMCNC: 32.6 G/DL (ref 31.5–35.7)
MCV RBC AUTO: 80.9 FL (ref 79–97)
MONOCYTES # BLD AUTO: 0.37 10*3/MM3 (ref 0.1–0.9)
MONOCYTES NFR BLD AUTO: 4.4 % (ref 5–12)
NEUTROPHILS NFR BLD AUTO: 7.11 10*3/MM3 (ref 1.7–7)
NEUTROPHILS NFR BLD AUTO: 84.1 % (ref 42.7–76)
NRBC BLD AUTO-RTO: 0 /100 WBC (ref 0–0.2)
PLATELET # BLD AUTO: 258 10*3/MM3 (ref 140–450)
PMV BLD AUTO: 10 FL (ref 6–12)
RBC # BLD AUTO: 4.09 10*6/MM3 (ref 4.14–5.8)
WBC NRBC COR # BLD AUTO: 8.45 10*3/MM3 (ref 3.4–10.8)
WHOLE BLOOD HOLD COAG: NORMAL

## 2025-04-08 PROCEDURE — 25010000002 MORPHINE PER 10 MG: Performed by: EMERGENCY MEDICINE

## 2025-04-08 PROCEDURE — 86140 C-REACTIVE PROTEIN: CPT | Performed by: EMERGENCY MEDICINE

## 2025-04-08 PROCEDURE — 85025 COMPLETE CBC W/AUTO DIFF WBC: CPT | Performed by: EMERGENCY MEDICINE

## 2025-04-08 PROCEDURE — 85652 RBC SED RATE AUTOMATED: CPT | Performed by: EMERGENCY MEDICINE

## 2025-04-08 PROCEDURE — 99283 EMERGENCY DEPT VISIT LOW MDM: CPT

## 2025-04-08 PROCEDURE — 25010000002 ONDANSETRON PER 1 MG: Performed by: EMERGENCY MEDICINE

## 2025-04-08 PROCEDURE — 96374 THER/PROPH/DIAG INJ IV PUSH: CPT

## 2025-04-08 PROCEDURE — 96375 TX/PRO/DX INJ NEW DRUG ADDON: CPT

## 2025-04-08 PROCEDURE — 83735 ASSAY OF MAGNESIUM: CPT | Performed by: EMERGENCY MEDICINE

## 2025-04-08 PROCEDURE — 80053 COMPREHEN METABOLIC PANEL: CPT | Performed by: EMERGENCY MEDICINE

## 2025-04-08 PROCEDURE — 82550 ASSAY OF CK (CPK): CPT | Performed by: EMERGENCY MEDICINE

## 2025-04-08 RX ORDER — SODIUM CHLORIDE 0.9 % (FLUSH) 0.9 %
10 SYRINGE (ML) INJECTION AS NEEDED
Status: DISCONTINUED | OUTPATIENT
Start: 2025-04-08 | End: 2025-04-09 | Stop reason: HOSPADM

## 2025-04-08 RX ORDER — MORPHINE SULFATE 2 MG/ML
4 INJECTION, SOLUTION INTRAMUSCULAR; INTRAVENOUS ONCE
Status: COMPLETED | OUTPATIENT
Start: 2025-04-08 | End: 2025-04-08

## 2025-04-08 RX ORDER — ONDANSETRON 2 MG/ML
4 INJECTION INTRAMUSCULAR; INTRAVENOUS ONCE
Status: COMPLETED | OUTPATIENT
Start: 2025-04-08 | End: 2025-04-08

## 2025-04-08 RX ADMIN — MORPHINE SULFATE 4 MG: 2 INJECTION, SOLUTION INTRAMUSCULAR; INTRAVENOUS at 23:21

## 2025-04-08 RX ADMIN — ONDANSETRON 4 MG: 2 INJECTION, SOLUTION INTRAMUSCULAR; INTRAVENOUS at 23:21

## 2025-04-09 VITALS
SYSTOLIC BLOOD PRESSURE: 194 MMHG | HEIGHT: 72 IN | HEART RATE: 75 BPM | DIASTOLIC BLOOD PRESSURE: 87 MMHG | WEIGHT: 250 LBS | TEMPERATURE: 97.8 F | BODY MASS INDEX: 33.86 KG/M2 | RESPIRATION RATE: 20 BRPM | OXYGEN SATURATION: 92 %

## 2025-04-09 LAB
ALBUMIN SERPL-MCNC: 3.1 G/DL (ref 3.5–5.2)
ALBUMIN/GLOB SERPL: 0.7 G/DL
ALP SERPL-CCNC: 66 U/L (ref 39–117)
ALT SERPL W P-5'-P-CCNC: 5 U/L (ref 1–41)
ANION GAP SERPL CALCULATED.3IONS-SCNC: 9 MMOL/L (ref 5–15)
AST SERPL-CCNC: 14 U/L (ref 1–40)
BILIRUB SERPL-MCNC: 0.3 MG/DL (ref 0–1.2)
BUN SERPL-MCNC: 38 MG/DL (ref 8–23)
BUN/CREAT SERPL: 10.5 (ref 7–25)
CALCIUM SPEC-SCNC: 8.9 MG/DL (ref 8.6–10.5)
CHLORIDE SERPL-SCNC: 98 MMOL/L (ref 98–107)
CK SERPL-CCNC: 151 U/L (ref 20–200)
CO2 SERPL-SCNC: 24 MMOL/L (ref 22–29)
CREAT SERPL-MCNC: 3.63 MG/DL (ref 0.76–1.27)
CRP SERPL-MCNC: 10.09 MG/DL (ref 0–0.5)
EGFRCR SERPLBLD CKD-EPI 2021: 17.3 ML/MIN/1.73
GLOBULIN UR ELPH-MCNC: 4.5 GM/DL
GLUCOSE SERPL-MCNC: 125 MG/DL (ref 65–99)
MAGNESIUM SERPL-MCNC: 1.8 MG/DL (ref 1.6–2.4)
POTASSIUM SERPL-SCNC: 4.6 MMOL/L (ref 3.5–5.2)
PROT SERPL-MCNC: 7.6 G/DL (ref 6–8.5)
SODIUM SERPL-SCNC: 131 MMOL/L (ref 136–145)

## 2025-04-09 PROCEDURE — 63710000001 PREDNISONE PER 1 MG: Performed by: EMERGENCY MEDICINE

## 2025-04-09 PROCEDURE — 25010000002 MORPHINE PER 10 MG: Performed by: EMERGENCY MEDICINE

## 2025-04-09 PROCEDURE — 96376 TX/PRO/DX INJ SAME DRUG ADON: CPT

## 2025-04-09 RX ORDER — PREDNISONE 20 MG/1
60 TABLET ORAL DAILY
Qty: 9 TABLET | Refills: 0 | Status: SHIPPED | OUTPATIENT
Start: 2025-04-09

## 2025-04-09 RX ORDER — MORPHINE SULFATE 2 MG/ML
2 INJECTION, SOLUTION INTRAMUSCULAR; INTRAVENOUS ONCE
Status: COMPLETED | OUTPATIENT
Start: 2025-04-09 | End: 2025-04-09

## 2025-04-09 RX ORDER — PREDNISONE 20 MG/1
60 TABLET ORAL ONCE
Status: COMPLETED | OUTPATIENT
Start: 2025-04-09 | End: 2025-04-09

## 2025-04-09 RX ORDER — PREDNISONE 20 MG/1
60 TABLET ORAL DAILY
Qty: 9 TABLET | Refills: 0 | Status: SHIPPED | OUTPATIENT
Start: 2025-04-09 | End: 2025-04-09 | Stop reason: SDUPTHER

## 2025-04-09 RX ADMIN — PREDNISONE 60 MG: 20 TABLET ORAL at 01:08

## 2025-04-09 RX ADMIN — MORPHINE SULFATE 2 MG: 2 INJECTION, SOLUTION INTRAMUSCULAR; INTRAVENOUS at 01:08

## 2025-04-09 NOTE — DISCHARGE INSTRUCTIONS
Follow-up with your primary care provider soon as possible.  Call Dr. East's office tomorrow to schedule an appointment.  Take medication as prescribed.  Continue taking Tylenol and pain medication as needed.  Return to the emergency department if worsening/persistent symptoms, inability to walk, fever, or other concern.

## 2025-04-09 NOTE — ED PROVIDER NOTES
EMERGENCY DEPARTMENT ENCOUNTER    Room Number:  18/18  PCP: Landon Walker MD  Historian: Patient, spouse    I initially evaluated the patient at 2225    HPI:  Chief Complaint: Generalized pain  A complete HPI/ROS/PMH/PSH/SH/FH are unobtainable due to: Nothing  Context: Ric Roy is a 69 y.o. male with a medical history of chronic kidney disease, hypertension, diabetes, arthritis, COPD who presents to the ED c/o acute on chronic generalized body pain.  Patient has had pain in his hands, shoulders, neck, back, and knees for several months.  Pain has worsened over the past few days.  Denies recent injury.  He has had some swelling in his right hand for a few weeks.  Denies fever, chest pain, abdominal pain, shortness of breath, vomiting, or diarrhea.  He has taken hydrocodone without relief.  He has been able to ambulate with a cane and rollator.            PAST MEDICAL HISTORY  Active Ambulatory Problems     Diagnosis Date Noted    Cervical spinal stenosis 07/05/2018    Nausea & vomiting 07/27/2018    Hypertension 07/27/2018    Diabetes mellitus 07/27/2018    Stage 3 chronic kidney disease 07/27/2018    Abdominal pain 08/30/2024    Chronic kidney disease 08/30/2024    EDDIE (acute kidney injury) 08/30/2024    Anemia 08/30/2024    Chronic diarrhea 08/30/2024    Hypomagnesemia 08/30/2024    Calculus of gallbladder with chronic cholecystitis without obstruction 08/30/2024    Obesity (BMI 30.0-34.9) 09/12/2024     Resolved Ambulatory Problems     Diagnosis Date Noted    Symptomatic cholelithiasis 08/30/2024     Past Medical History:   Diagnosis Date    Arthritis     Asthma     At risk for sleep apnea     Back pain     Cholelithiasis [id number]    Cluster headaches     COPD (chronic obstructive pulmonary disease)     COVID 2022    Fissure, anal     Foot drop, right     GERD (gastroesophageal reflux disease)     History of pancreatitis     Hyperlipidemia     Kidney failure 2023    Microscopic colitis, unspecified  microscopic colitis type     Pancreatitis [id number]    Skin abrasion          PAST SURGICAL HISTORY  Past Surgical History:   Procedure Laterality Date    ANTERIOR CERVICAL DISCECTOMY W/ FUSION N/A 07/27/2018    Procedure: C4 to C7 anterior cervical discectomy, fusion and instrumentation;  Surgeon: Lavon Rincon MD;  Location: [facility] OR;  Service: Neurosurgery    ARTERIOVENOUS FISTULA/SHUNT SURGERY Left 7/20/2023    Procedure: LEFT ARM ARTERIOVENOUS FISTULA;  Surgeon: Gus Chong II, MD;  Location: [facility] MAIN OR;  Service: Vascular;  Laterality: Left;    BACK SURGERY      lumbar x2    CHOLECYSTECTOMY N/A 8/31/2024    Procedure: CHOLECYSTECTOMY LAPAROSCOPIC WITH DAVINCI ROBOT WITH IOC;  Surgeon: Matt Michael MD;  Location: [facility] MAIN OR;  Service: General;  Laterality: N/A;    COLONOSCOPY      COLONOSCOPY W/ BIOPSIES      PILONIDAL CYST DRAINAGE      TONSILLECTOMY           FAMILY HISTORY  Family History   Problem Relation Age of Onset    Obesity Other     Malig Hyperthermia Neg Hx          SOCIAL HISTORY  Social History     Socioeconomic History    Marital status:     Number of children: 0    Years of education: 12   Tobacco Use    Smoking status: Every Day     Current packs/day: 1.00     Average packs/day: 1 pack/day for 50.0 years (50.0 ttl pk-yrs)     Types: Cigarettes    Smokeless tobacco: Never    Tobacco comments:     Last cigarette 7/26/18 2130   Vaping Use    Vaping status: Never Used   Substance and Sexual Activity    Alcohol use: Not Currently    Drug use: Yes     Frequency: 2.0 times per week     Types: Marijuana    Sexual activity: Defer         ALLERGIES  Patient has no known allergies.    REVIEW OF SYSTEMS  Review of Systems  Included in HPI  All systems reviewed and negative except for those discussed in HPI.      PHYSICAL EXAM  ED Triage Vitals [04/08/25 2021]   Temp Heart Rate Resp BP SpO2   97.8 °F (36.6 °C) 81 20 (!) 216/96 96 %      Temp src Heart Rate Source  Patient Position BP Location FiO2 (%)   Tympanic -- -- -- --       Physical Exam      GENERAL: Awake, alert, oriented x 3.  Chronically ill but nontoxic-appearing elderly male.  Appears older than stated age.  He appears uncomfortable  HENT: NCAT, nares patent  EYES: no scleral icterus  CV: regular rhythm, normal rate  RESPIRATORY: normal effort, clear to auscultation bilaterally  ABDOMEN: soft, nontender  MUSCULOSKELETAL: There is tenderness of the bilateral hands, wrist, elbows, shoulders, and knees.  There is diffuse tenderness of the C/T/and L-spine.  There is some swelling over the dorsal aspect of the right hand but no overlying warmth or erythema.  There is no other joint swelling.  NEURO: Speech is normal.  No facial droop.  Normal strength and light touch sensation in all extremities.  PSYCH:  calm, cooperative  SKIN: warm, dry    Vital signs and nursing notes reviewed.          LAB RESULTS  Recent Results (from the past 24 hours)   Comprehensive Metabolic Panel    Collection Time: 04/08/25 11:16 PM    Specimen: Blood   Result Value Ref Range    Glucose 125 (H) 65 - 99 mg/dL    BUN 38 (H) 8 - 23 mg/dL    Creatinine 3.63 (H) 0.76 - 1.27 mg/dL    Sodium 131 (L) 136 - 145 mmol/L    Potassium 4.6 3.5 - 5.2 mmol/L    Chloride 98 98 - 107 mmol/L    CO2 24.0 22.0 - 29.0 mmol/L    Calcium 8.9 8.6 - 10.5 mg/dL    Total Protein 7.6 6.0 - 8.5 g/dL    Albumin 3.1 (L) 3.5 - 5.2 g/dL    ALT (SGPT) 5 1 - 41 U/L    AST (SGOT) 14 1 - 40 U/L    Alkaline Phosphatase 66 39 - 117 U/L    Total Bilirubin 0.3 0.0 - 1.2 mg/dL    Globulin 4.5 gm/dL    A/G Ratio 0.7 g/dL    BUN/Creatinine Ratio 10.5 7.0 - 25.0    Anion Gap 9.0 5.0 - 15.0 mmol/L    eGFR 17.3 (L) >60.0 mL/min/1.73   CK    Collection Time: 04/08/25 11:16 PM    Specimen: Blood   Result Value Ref Range    Creatine Kinase 151 20 - 200 U/L   Magnesium    Collection Time: 04/08/25 11:16 PM    Specimen: Blood   Result Value Ref Range    Magnesium 1.8 1.6 - 2.4 mg/dL    Sedimentation Rate    Collection Time: 04/08/25 11:16 PM    Specimen: Blood   Result Value Ref Range    Sed Rate 65 (H) 0 - 20 mm/hr   C-reactive Protein    Collection Time: 04/08/25 11:16 PM    Specimen: Blood   Result Value Ref Range    C-Reactive Protein 10.09 (H) 0.00 - 0.50 mg/dL   CBC Auto Differential    Collection Time: 04/08/25 11:16 PM    Specimen: Blood   Result Value Ref Range    WBC 8.45 3.40 - 10.80 10*3/mm3    RBC 4.09 (L) 4.14 - 5.80 10*6/mm3    Hemoglobin 10.8 (L) 13.0 - 17.7 g/dL    Hematocrit 33.1 (L) 37.5 - 51.0 %    MCV 80.9 79.0 - 97.0 fL    MCH 26.4 (L) 26.6 - 33.0 pg    MCHC 32.6 31.5 - 35.7 g/dL    RDW 13.7 12.3 - 15.4 %    RDW-SD 39.5 37.0 - 54.0 fl    MPV 10.0 6.0 - 12.0 fL    Platelets 258 140 - 450 10*3/mm3    Neutrophil % 84.1 (H) 42.7 - 76.0 %    Lymphocyte % 8.4 (L) 19.6 - 45.3 %    Monocyte % 4.4 (L) 5.0 - 12.0 %    Eosinophil % 2.5 0.3 - 6.2 %    Basophil % 0.2 0.0 - 1.5 %    Immature Grans % 0.4 0.0 - 0.5 %    Neutrophils, Absolute 7.11 (H) 1.70 - 7.00 10*3/mm3    Lymphocytes, Absolute 0.71 0.70 - 3.10 10*3/mm3    Monocytes, Absolute 0.37 0.10 - 0.90 10*3/mm3    Eosinophils, Absolute 0.21 0.00 - 0.40 10*3/mm3    Basophils, Absolute 0.02 0.00 - 0.20 10*3/mm3    Immature Grans, Absolute 0.03 0.00 - 0.05 10*3/mm3    nRBC 0.0 0.0 - 0.2 /100 WBC   Gray Top    Collection Time: 04/08/25 11:16 PM   Result Value Ref Range    Extra Tube Hold for add-ons.    Light Blue Top    Collection Time: 04/08/25 11:16 PM   Result Value Ref Range    Extra Tube Hold for add-ons.        Ordered the above labs and reviewed the results.        RADIOLOGY  No Radiology Exams Resulted Within Past 24 Hours    Ordered the above noted radiological studies. Reviewed by me in PACS.            PROCEDURES  Procedures        OUTPATIENT MEDICATION MANAGEMENT:  Current Facility-Administered Medications Ordered in Epic   Medication Dose Route Frequency Provider Last Rate Last Admin    sodium chloride 0.9 % flush 10 mL   10 mL Intravenous Everette Chavez MD         Current Outpatient Medications Ordered in Epic   Medication Sig Dispense Refill    acetaminophen (TYLENOL) 500 MG tablet Take 2 tablets by mouth Every 6 (Six) Hours As Needed for Mild Pain.      albuterol sulfate  (90 Base) MCG/ACT inhaler Inhale 2 puffs Every 4 (Four) Hours As Needed for Wheezing.      calcium carbonate (Tums) 500 MG chewable tablet Chew 2 tablets Daily. 30 tablet 0    carvedilol (COREG) 12.5 MG tablet Take 1 tablet by mouth 2 (Two) Times a Day With Meals.      furosemide (LASIX) 80 MG tablet Take 1 tablet by mouth 2 (Two) Times a Day.      gabapentin (NEURONTIN) 300 MG capsule Take 1 capsule by mouth 2 (Two) Times a Day.      hydrALAZINE (APRESOLINE) 100 MG tablet Take 1 tablet by mouth 2 (Two) Times a Day. Taking 50 MG 3 times a day      HYDROcodone-acetaminophen (NORCO) 7.5-325 MG per tablet Take 1 tablet by mouth Every 6 (Six) Hours As Needed for Moderate Pain.  0    loperamide (IMODIUM) 2 MG capsule Take 1 capsule by mouth Daily With Breakfast.      magnesium oxide (MAG-OX) 400 tablet tablet Take 1 tablet by mouth 2 (Two) Times a Day.      omeprazole (priLOSEC) 40 MG capsule Take 1 capsule by mouth Every Morning.      predniSONE (DELTASONE) 20 MG tablet Take 3 tablets by mouth Daily. 9 tablet 0    simvastatin (ZOCOR) 20 MG tablet Take 1 tablet by mouth Every Night.      vitamin D (ERGOCALCIFEROL) 1.25 MG (23460 UT) capsule capsule Take 1 capsule by mouth 1 (One) Time Per Week.             MEDICATIONS GIVEN IN ER  Medications   sodium chloride 0.9 % flush 10 mL (has no administration in time range)   ondansetron (ZOFRAN) injection 4 mg (4 mg Intravenous Given 4/8/25 2321)   morphine injection 4 mg (4 mg Intravenous Given 4/8/25 2321)   predniSONE (DELTASONE) tablet 60 mg (60 mg Oral Given 4/9/25 0108)   morphine injection 2 mg (2 mg Intravenous Given 4/9/25 0108)                   MEDICAL DECISION MAKING, PROGRESS, and CONSULTS    All  labs have been independently reviewed by me.  All radiology studies have been reviewed by me and I have also reviewed the radiology report.   EKG's independently viewed and interpreted by me.  Discussion below represents my analysis of pertinent findings related to patient's condition, differential diagnosis, treatment plan and final disposition.      Additional sources:    - Discussed/ obtained information from independent historians: Spouse at bedside    - External (non-ED) record review: Patient was last admitted here in August 2024 for upper abdominal pain.  CT scan showed cholelithiasis.  He underwent laparoscopic cholecystectomy.    -Prescription drug monitoring program review: ADELIA reviewed by Everette Giang MD       - Chronic or social conditions impacting patient care (Social Determinants of Health): None          Orders placed during this visit:  Orders Placed This Encounter   Procedures    Comprehensive Metabolic Panel    CK    Magnesium    Sedimentation Rate    C-reactive Protein    CBC Auto Differential    Comfort Draw    Insert Peripheral IV    CBC & Differential    Gray Top    Light Blue Top         Additional orders considered but not ordered:          Differential diagnosis includes, but is not limited to:    Arthritis, spinal stenosis, electrolyte abnormality, rhabdomyolysis      Independent interpretation of labs, radiology studies, and discussions with consultants:  ED Course as of 04/09/25 0155   Tue Apr 08, 2025 2225 BP(!): 216/96 [WH]   2225 Temp: 97.8 °F (36.6 °C) [WH]   2225 Heart Rate: 81 [WH]   2225 Resp: 20 [WH]   2225 SpO2: 96 % [WH]   2225 Device (Oxygen Therapy): room air []   Wed Apr 09, 2025   0004 C-Reactive Protein(!): 10.09 [WH]   0004 Magnesium: 1.8 [WH]   0004 Creatine Kinase: 151 [WH]   0004 Glucose(!): 125 [WH]   0004 BUN(!): 38 [WH]   0004 Creatinine(!): 3.63  BUN 44 and creatinine 3.49 in January 2025 [WH]   0004 Sodium(!): 131 [WH]   0004 Potassium: 4.6 [WH]    0004 Calcium: 8.9 [WH]   0004 Sed Rate(!): 65 [WH]   0004 WBC: 8.45 [WH]   0004 Hemoglobin(!): 10.8  Improved from 6 months ago [WH]   0026 BP(!): 194/87 [WH]   0104 Patient is feeling better after morphine.  Test results and diagnoses were discussed with him and his wife.  Shared decision making was discussed.  Patient was offered admission but prefers to go home.  He will be started on a course of prednisone.  He has hydrocodone at home and declined a prescription for Percocet.  I recommended he follow-up with rheumatology and his PCP.  All questions were answered.  Return precautions were discussed.  He will be given a dose of prednisone prior to discharge. [WH]   0111 MDM: Patient presented to the ED with acute on chronic generalized pain.  He had some mild swelling in his right hand but no evidence of overlying cellulitis.  Have low suspicion for septic joint.  He was afebrile.  CRP and sed rate were elevated.  White blood cell count and CK were normal.  Patient has chronic kidney disease and renal function was stable.  Pain improved with morphine.  Patient was offered admission but declined.  Pain may be related to arthritis and possibly spinal stenosis.  He was advised to follow-up with rheumatology. [WH]      ED Course User Index  [] Everette Giang MD         COMPLEXITY OF CARE  Admission was considered but after careful review of the patient's presentation, physical examination, diagnostic results, and response to treatment the patient may be safely discharged with outpatient follow-up.  Patient was offered admission but preferred to go home.    DIAGNOSIS  Final diagnoses:   Generalized pain   Chronic renal failure, stage 4 (severe)         DISPOSITION  DISCHARGE    Patient discharged in stable condition.    Reviewed implications of results, diagnosis, meds, responsibility to follow up, warning signs and symptoms of possible worsening, potential complications and reasons to return to ER, including  worsening/persistent symptoms, joint swelling, fever, or other concern.    Patient/Family voiced understanding of above instructions.    Discussed plan for discharge, as there is no emergent indication for admission. Patient referred to primary care provider for BP management due to today's BP. Pt/family is agreeable and understands need for follow up and repeat testing.  Pt is aware that discharge does not mean that nothing is wrong but it indicates no emergency is present that requires admission and they must continue care with follow-up as given below or physician of their choice.     FOLLOW-UP  Landon Walker MD  [address] [facility]  [address]  [facility] [id number] [phone]    Schedule an appointment as soon as possible for a visit       Liu East MD  [address] [facility] [address]  [facility] [id number] [phone]    Schedule an appointment as soon as possible for a visit            Medication List        New Prescriptions      predniSONE 20 MG tablet  Commonly known as: DELTASONE  Take 3 tablets by mouth Daily.               Where to Get Your Medications        These medications were sent to [facility] [address] [place] [address] - [address] [facility] - [phone]  - [phone]   [address] [facility], [facility] [address]      Phone: [phone]   predniSONE 20 MG tablet                   Latest Documented Vital Signs:  AS OF 01:55 EDT VITALS:    BP - (!) 194/87  HR - 75  TEMP - 97.8 °F (36.6 °C) (Tympanic)  O2 SATS - 92%    ADELIA reviewed by Everette Giang MD       --    Please note that portions of this were completed with a voice recognition program.       Note Disclaimer: At [facility], we believe that sharing information builds trust and better relationships. You are receiving this note because you are receiving care at [facility] or recently visited. It is possible you will see health information before a provider has talked with you about it. This kind of information can be  easy to misunderstand. To help you fully understand what it means for your health, we urge you to discuss this note with your provider.             Everette Giang MD  04/09/25 0155

## (undated) DEVICE — PLATE 7200062 ATL VISION ELITE 62.5MM
Type: IMPLANTABLE DEVICE | Status: NON-FUNCTIONAL
Brand: ATLANTIS® ANTERIOR CERVICAL PLATE SYSTEM
Removed: 2018-07-27

## (undated) DEVICE — TBG PENCL TELESCP MEGADYNE SMOKE EVAC 10FT

## (undated) DEVICE — PK NEURO SPINE 40

## (undated) DEVICE — SUT SILK 3/0 TIES 18IN A184H

## (undated) DEVICE — DRP MICROSCP LEICA W/GLASS LENS

## (undated) DEVICE — GLV SURG BIOGEL LTX PF 7 1/2

## (undated) DEVICE — SUT SILK 2/0 FS BLK 18IN 685G

## (undated) DEVICE — VESSEL LOOPS X-RAY DETECTABLE: Brand: DEROYAL

## (undated) DEVICE — ADHS SKIN SURG TISS VISC PREMIERPRO EXOFIN HI/VISC FAST/DRY

## (undated) DEVICE — SUT SILK 3/0 SH CR5 18IN C0135

## (undated) DEVICE — SYR LUERLOK 30CC

## (undated) DEVICE — SUT SILK 2/0 TIES 18IN A185H

## (undated) DEVICE — TRAP FLD MINIVAC MEGADYNE 100ML

## (undated) DEVICE — SOL NS 500ML

## (undated) DEVICE — ANTIBACTERIAL UNDYED BRAIDED (POLYGLACTIN 910), SYNTHETIC ABSORBABLE SUTURE: Brand: COATED VICRYL

## (undated) DEVICE — JACKSON-PRATT 100CC BULB RESERVOIR: Brand: CARDINAL HEALTH

## (undated) DEVICE — COVER,C-ARM,41X74: Brand: MEDLINE

## (undated) DEVICE — THE STERILE LIGHT HANDLE COVER IS USED WITH STERIS SURGICAL LIGHTING AND VISUALIZATION SYSTEMS.

## (undated) DEVICE — 6.0MM PRECISION ROUND

## (undated) DEVICE — ADHS SKIN DERMABOND PROPEN

## (undated) DEVICE — TROCAR SITE CLOSURE DEVICE: Brand: ENDO CLOSE

## (undated) DEVICE — BANDAGE,GAUZE,BULKEE II,4.5"X4.1YD,STRL: Brand: MEDLINE

## (undated) DEVICE — NDL SPINE 18G 31/2IN PNK

## (undated) DEVICE — SYR SLPTP 20CC

## (undated) DEVICE — SUT PROLN 6/0 BV1 D/A 30IN 8709H

## (undated) DEVICE — ELECTRD BLD EDGE/INSUL1P 2.4X5.1MM STRL

## (undated) DEVICE — GLV SURG SENSICARE PI LF PF 7.5 GRN STRL

## (undated) DEVICE — PATIENT RETURN ELECTRODE, SINGLE-USE, CONTACT QUALITY MONITORING, ADULT, WITH 9FT CORD, FOR PATIENTS WEIGING OVER 33LBS. (15KG): Brand: MEGADYNE

## (undated) DEVICE — COVER,TABLE,HEAVY DUTY,79"X110",STRL: Brand: MEDLINE

## (undated) DEVICE — PK AV FISTL/SHNT 40

## (undated) DEVICE — STPLR SKIN VISISTAT WD 35CT

## (undated) DEVICE — 1 ML TUBERCULIN SYRINGE REGULAR TIP: Brand: MONOJECT

## (undated) DEVICE — SEAL

## (undated) DEVICE — EXTENSION SET, MALE LUER LOCK ADAPTER WITH RETRACTABLE COLLAR

## (undated) DEVICE — APPL CHLORAPREP HI/LITE 26ML ORNG

## (undated) DEVICE — STPCK 3WY D201 DISCOFIX

## (undated) DEVICE — CATH IV INSYTE AUTOGARD 14G 1 1/2IN ORNG

## (undated) DEVICE — SUCTION IRRIGATOR: Brand: ENDOWRIST

## (undated) DEVICE — Device

## (undated) DEVICE — BLADELESS OBTURATOR: Brand: WECK VISTA

## (undated) DEVICE — GOWN,NON-REINFORCED,SIRUS,SET IN SLV,XL: Brand: MEDLINE

## (undated) DEVICE — DRSNG WND GZ PAD BORDERED 4X8IN STRL

## (undated) DEVICE — CATH URETRL SOF/FLEX OPN/END 4F 70CM

## (undated) DEVICE — SOL ANTISTICK CAUTRY ELECTROLUBE LF

## (undated) DEVICE — PK ATS CUST W CARDIOTOMY RESEVOIR

## (undated) DEVICE — ST TBG AIRSEAL BIF FLTR W/ACT/CHARCOAL/FLTR

## (undated) DEVICE — SOL NACL 0.9PCT 1000ML

## (undated) DEVICE — CODMAN® SURGICAL PATTIES 3/4" X 3/4" (1.91CM X 1.91CM): Brand: CODMAN®

## (undated) DEVICE — ADHS SKIN DERMABOND TOP ADVANCED

## (undated) DEVICE — COLUMN DRAPE

## (undated) DEVICE — SMOKE EVACUATION TUBING WITH 7/8 IN TO 1/4 IN REDUCER: Brand: BUFFALO FILTER

## (undated) DEVICE — TROC BLADLES AIRSEAL/OPTI THRD 8X120MM 1P/U

## (undated) DEVICE — 3.0MM NEURO (MATCH HEAD) LESS AGGRESSIVE

## (undated) DEVICE — CONN TBG Y 5 IN 1 LF STRL

## (undated) DEVICE — DRAPE,REIN 53X77,STERILE: Brand: MEDLINE

## (undated) DEVICE — ENDOPATH XCEL BLADELESS TROCARS WITH STABILITY SLEEVES: Brand: ENDOPATH XCEL

## (undated) DEVICE — ARM DRAPE

## (undated) DEVICE — DISPOSABLE IRRIGATION BIPOLAR CORD, M1000 TYPE: Brand: KIRWAN

## (undated) DEVICE — SYR LL TP 10ML STRL

## (undated) DEVICE — GLV SURG SENSICARE W/ALOE PF LF 7.5 STRL

## (undated) DEVICE — SUT VIC 0 TIES 18IN J912G

## (undated) DEVICE — LAPAROVUE VISIBILITY SYSTEM LAPAROSCOPIC SOLUTIONS: Brand: LAPAROVUE

## (undated) DEVICE — LOU LAP CHOLE: Brand: MEDLINE INDUSTRIES, INC.

## (undated) DEVICE — SUT MNCRYL PLS ANTIB UD 4/0 PS2 18IN

## (undated) DEVICE — SHEET, DRAPE, SPLIT, STERILE: Brand: MEDLINE

## (undated) DEVICE — GLV SURG BIOGEL LTX PF 7

## (undated) DEVICE — APPL CHLORAPREP W/TINT 10.5ML PERC STRL

## (undated) DEVICE — DRN WND JP RND W TROC SIL 10F 1/8IN

## (undated) DEVICE — TISSUE RETRIEVAL SYSTEM: Brand: INZII RETRIEVAL SYSTEM

## (undated) DEVICE — SYR LUERLOK 20CC BX/50